# Patient Record
Sex: FEMALE | Race: BLACK OR AFRICAN AMERICAN | Employment: OTHER | URBAN - METROPOLITAN AREA
[De-identification: names, ages, dates, MRNs, and addresses within clinical notes are randomized per-mention and may not be internally consistent; named-entity substitution may affect disease eponyms.]

---

## 2017-06-11 ENCOUNTER — HOSPITAL ENCOUNTER (EMERGENCY)
Age: 67
Discharge: HOME | End: 2017-06-11
Attending: EMERGENCY MEDICINE
Payer: MEDICARE

## 2017-06-11 ENCOUNTER — APPOINTMENT (OUTPATIENT)
Dept: GENERAL RADIOLOGY | Age: 67
End: 2017-06-11
Attending: EMERGENCY MEDICINE
Payer: MEDICARE

## 2017-06-11 VITALS
BODY MASS INDEX: 43.41 KG/M2 | HEIGHT: 60 IN | RESPIRATION RATE: 18 BRPM | OXYGEN SATURATION: 99 % | SYSTOLIC BLOOD PRESSURE: 147 MMHG | TEMPERATURE: 98.5 F | DIASTOLIC BLOOD PRESSURE: 63 MMHG | WEIGHT: 221.12 LBS
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
## 2017-06-11 DIAGNOSIS — S22.32XA CLOSED FRACTURE OF RIB OF LEFT SIDE, INITIAL ENCOUNTER: Primary | ICD-10-CM

## 2017-06-11 PROCEDURE — 71101 X-RAY EXAM UNILAT RIBS/CHEST: CPT

## 2017-06-11 PROCEDURE — 74011250637 HC RX REV CODE- 250/637: Performed by: EMERGENCY MEDICINE

## 2017-06-11 PROCEDURE — 99283 EMERGENCY DEPT VISIT LOW MDM: CPT

## 2017-06-11 RX ORDER — OXYCODONE HYDROCHLORIDE 5 MG/1
5 TABLET ORAL
Qty: 8 TAB | Refills: 0 | Status: SHIPPED | OUTPATIENT
Start: 2017-06-11 | End: 2017-09-22

## 2017-06-11 RX ORDER — ACETAMINOPHEN 500 MG
1000 TABLET ORAL
Status: COMPLETED | OUTPATIENT
Start: 2017-06-11 | End: 2017-06-11

## 2017-06-11 RX ADMIN — ACETAMINOPHEN 1000 MG: 500 TABLET ORAL at 20:16

## 2017-06-11 NOTE — ED NOTES
Assumed care of pt at this time, received bedside report from [address] with pt included in care. Pt is resting in room, with call bell in reach. All questions answered.

## 2017-06-11 NOTE — ED PROVIDER NOTES
HPI Comments: Jordana Mary, 75 y.o. Female with h/o HTN, DM, presents via EMS to [id number] [facility] ED with cc of acute onset of L sided rib pain s/p mechanical GLF yesterday in bathroom. Patient states she typically ambulates with a walker and did not have her walker with her in the bathroom, and fell over onto her L side s/p voiding. She denies any improvement of her pain with 500 mg Tylenol at home. Patient denies any difficulties with her blood sugars. The patient specifically denies head trauma, LOC, difficulty breathing, extremity pain, R rib pain, or other injuries. PCP: Shannan Avelar MD    PMHx significant for: asthma, DM, HTN, CAD, MI, heart failure  PSHx significant for: none  Social history significant for: + Tobacco, - EtOH, - Illicit Drug Use    There are no other complaints, changes, or physical findings at this time. Written by Tesha Brooks ED Scribe, as dictated by Lázaro Andino MD.     The history is provided by the patient. No  was used. Past Medical History:   Diagnosis Date    Asthma     CAD (coronary artery disease)     H/O acute myocardial infarction     Heart failure (HCC)     Hypertension        Past Surgical History:   Procedure Laterality Date    HX CHOLECYSTECTOMY      gall stones removed    HX GYN           No family history on file. Social History     Social History    Marital status: LEGALLY      Spouse name: N/A    Number of children: N/A    Years of education: N/A     Occupational History    Not on file. Social History Main Topics    Smoking status: Current Every Day Smoker    Smokeless tobacco: Not on file    Alcohol use No    Drug use: Not on file    Sexual activity: Not on file     Other Topics Concern    Not on file     Social History Narrative         ALLERGIES: Garlic and Pcn [penicillins]    Review of Systems   Respiratory: Negative for shortness of breath. Musculoskeletal:        Positive for rib pain. Negative for head trauma, extremity pain, or other injuries. Neurological:        Negative for LOC   All other systems reviewed and are negative. Patient Vitals for the past 12 hrs:   Temp Resp BP SpO2   06/11/17 2012 - - - 99 %   06/11/17 2003 - - - 94 %   06/11/17 2002 - - 147/63 -   06/11/17 1742 98.5 °F (36.9 °C) 18 - 97 %        Physical Exam   Vital signs and nursing notes reviewed    CONSTITUTIONAL: Alert, awake, in no apparent distress; well-developed; well-nourished. Sitting along side of bed with walker. HEAD:  Normocephalic, atraumatic  EYES: PERRL; EOM's intact. ENTM: Nose: no rhinorrhea; Throat: no erythema or exudate, mucous membranes moist  Neck:  Supple. trachea is midline. No midline tenderness. RESP: Chest clear, equal breath sounds. - W/R/R  CV: S1 and S2 WNL; No murmurs, gallops or rubs. 2+ radial and DP pulses bilaterally. No flail chest. Tenderness over L lateral ribcage over ribs 8, 9  GI: non-distended, normal bowel sounds, abdomen soft and non-tender. No masses or organomegaly. : No costo-vertebral angle tenderness. BACK:  Non-tender, normal appearance. Pelvis is stable, NT.   UPPER EXT:  Normal inspection. no joint or soft tissue swelling. Atraumatic. LOWER EXT: No edema, no calf tenderness. Atraumatic. NEURO: Alert and oriented x3, 5/5 strength and light touch sensation intact in bilateral upper and lower extremities. SKIN: No rashes; Warm and dry. Contusion along L lateral ribcage. No overlying rash or vesicles. PSYCH: Normal mood, normal affect  Written by Jenny Fernandes ED Scribgenia, as dictated by Alma Rey MD.       MDM  Number of Diagnoses or Management Options  Closed fracture of rib of left side, initial encounter:   Fall, initial encounter:   Diagnosis management comments:   76 y/o F with mechanical fall with nondisplaced L rib fx to explain pt's L sided CW pain. Plan for discharge home with supportive care.         Amount and/or Complexity of Data Reviewed  Tests in the radiology section of CPT®: ordered and reviewed  Review and summarize past medical records: yes    Patient Progress  Patient progress: stable    ED Course       Procedures     Progress Note:  7:37 PM  Patient ambulatory to bathroom. Written by Belle Gordon, ED Scribe, as dictated by Ashley Atkinson MD.     Progress Note:  8:03 PM  Updated and counseled on XR results. Counseled on home care plan including pain control with Tylenol, bed rest, and ice packs to the affected area. Offered patient narcotics for pain control. Discussed possible side effects of narcotic use including constipation. Addressed questions. Patient is tolerating PO fluids without any difficulty. Written by Belle Gordon ED Scribe, as dictated by Ashley Atkinson MD.     Progress Note:  8:06 PM  Discussed the risks of smoking and the benefits of smoking cessation as well as the long term sequelae of smoking with the pt. The patient verbalized their understanding. Written by Belle Gordon ED Scribe, as dictated by Ashley Atkinson MD.       IMAGING RESULTS:  XR RIBS LT W PA CXR MIN 3 V   Final Result   EXAM: XR RIBS LT W PA CXR MIN 3 V     INDICATION: injury     COMPARISON: Chest x-ray 3/12/2016     FINDINGS: A frontal radiograph of the chest and 3 oblique views of the left ribs  demonstrate a subtle nondisplaced fracture of the lateral ninth rib. There is no  pneumothorax or pleural effusion. The lungs are clear. The cardiac and  mediastinal contours are stable. Note of suboptimal assessment due to abundant  overlying soft tissues.     IMPRESSION  IMPRESSION: Subtle fracture demonstrated laterally in left ninth rib. MEDICATIONS GIVEN:  Medications   acetaminophen (TYLENOL) tablet 1,000 mg (1,000 mg Oral Given 6/11/17 2016)       IMPRESSION:  1. Closed fracture of rib of left side, initial encounter    2. Fall, initial encounter        PLAN:  1.    Discharge Medication List as of 6/11/2017  8:13 PM      START taking these medications    Details   oxyCODONE IR (ROXICODONE) 5 mg immediate release tablet Take 1 Tab by mouth every four (4) hours as needed for Pain for up to 8 doses. Max Daily Amount: 30 mg., Print, Disp-8 Tab, R-0         CONTINUE these medications which have NOT CHANGED    Details   albuterol (PROVENTIL VENTOLIN) 2.5 mg /3 mL (0.083 %) nebulizer solution 3 mL by Nebulization route every four (4) hours as needed for Wheezing or Shortness of Breath., Print, Disp-24 Each, R-3      benzonatate (TESSALON) 100 mg capsule Take 1 Cap by mouth three (3) times daily as needed for Cough. , Print, Disp-30 Cap, R-0      bumetanide (BUMEX) 2 mg tablet Take 0.5 Tabs by mouth daily. , Print, Disp-30 Tab, R-0      ipratropium (ATROVENT) 0.02 % nebulizer solution 2.5 mL by Nebulization route every six (6) hours as needed for Wheezing., Print, Disp-25 mL, R-3      losartan (COZAAR) 25 mg tablet Take 1 Tab by mouth daily. Start after 3/21/16, Print, Disp-30 Tab, R-0      calcitRIOL (ROCALTROL) 0.25 mcg capsule Take 1 Cap by mouth daily. , Print, Disp-30 Cap, R-3      carvedilol (COREG) 3.125 mg tablet Take 1 Tab by mouth two (2) times daily (with meals). , Print, Disp-60 Tab, R-0      predniSONE (STERAPRED DS) 10 mg dose pack See administration instruction per 10mg dose pack, taper over 12 days, Print, Disp-42 Tab, R-0      levofloxacin (LEVAQUIN) 750 mg tablet Take 1 Tab by mouth every fourty-eight (48) hours. , Print, Disp-3 Tab, R-0      HYDROcodone-acetaminophen (LORTAB 5-325) 5-325 mg per tablet Take 1 Tab by mouth every six (6) hours as needed for Pain. Max Daily Amount: 4 Tabs., Print, Disp-40 Tab, R-0      guaiFENesin ER (MUCINEX) 600 mg ER tablet Take 1 Tab by mouth two (2) times daily as needed for Congestion. , Print, Disp-20 Tab, R-0      aspirin delayed-release 81 mg tablet Take 81 mg by mouth daily. , Historical Med      atorvastatin (LIPITOR) 80 mg tablet Take 80 mg by mouth daily. , Historical Med      budesonide-formoterol (SYMBICORT) 160-4.5 mcg/actuation HFA inhaler Take 2 Puffs by inhalation two (2) times a day., Historical Med      acetaminophen (TYLENOL) 500 mg tablet Take 500 mg by mouth every four (4) hours. Indications: BACK PAIN, Historical Med      omeprazole (PRILOSEC) 20 mg capsule Take 20 mg by mouth Before breakfast and dinner., Historical Med      gabapentin (NEURONTIN) 300 mg capsule Take 300 mg by mouth three (3) times daily. , Historical Med      montelukast (SINGULAIR) 10 mg tablet Take 10 mg by mouth daily. , Historical Med      cholecalciferol (VITAMIN D3) 1,000 unit tablet Take 2,000 Units by mouth daily. , Historical Med      clopidogrel (PLAVIX) 75 mg tablet Take 75 mg by mouth daily. , Historical Med      fluticasone (FLONASE) 50 mcg/actuation nasal spray 2 Sprays by Both Nostrils route daily. , Historical Med      sertraline (ZOLOFT) 100 mg tablet Take 100 mg by mouth daily. , Historical Med      loratadine (CLARITIN) 10 mg tablet Take 10 mg by mouth daily as needed for Allergies or Rhinitis., Historical Med      albuterol (PROVENTIL HFA, VENTOLIN HFA) 90 mcg/actuation inhaler Take 2 Puffs by inhalation every four (4) hours as needed for Wheezing or Shortness of Breath., Historical Med           2. Follow-up Information     Follow up With Details Comments Contact Info    Shannan Avelar MD  As needed Patient can only remember the practice name and not the physician      [facility]  If symptoms worsen [address]  [address]   [phone]         Return to ED if worse     Discharge Note:  8:12 PM  The pt is ready for discharge. The pt's signs, symptoms, diagnosis, and discharge instructions have been discussed and pt has conveyed their understanding. The pt is to follow up as recommended or return to ER should their symptoms worsen. Plan has been discussed and pt is in agreement.     This note is prepared by Cindy Mock, acting as a Scribe for Jonas Villar MD.    Jonas Villar MD: The scribe's documentation has been prepared under my direction and personally reviewed by me in its entirety. I confirm that the notes above accurately reflects all work, treatment, procedures, and medical decision making performed by me.

## 2017-06-12 NOTE — ED NOTES
Pt discharged with written instructions and prescriptions at this time. Pt verbalizes understanding and all questions were answered. Discharged by Carin Duque, in stable condition, with a friend.

## 2017-06-12 NOTE — DISCHARGE INSTRUCTIONS
Please ice your sore rib area 5-6 times a day, 20 minutes at a time to reduce pain. Use Tylenol, 1000 mg every 6 hrs for pain. Use the additional pain medication as needed but caution with standing as can make you unsteady. Always use your walker to prevent falls. Broken Rib: Care Instructions  Your Care Instructions    A broken rib is a crack or break in one of the bones of the rib cage. Breathing can be very painful because the muscles used for breathing pull on the rib. In most cases, a broken rib will heal on its own. You can take pain medicine while the rib mends. Pain relief allows you to take deep breaths. In the past, doctors recommended taping or wrapping broken ribs. This is no longer done because taping makes it hard for you to take deep breaths. You need to take deep breaths at least once an hour to prevent pneumonia or a partial collapse of a lung. Your rib will heal in about 6 weeks. You heal best when you take good care of yourself. Eat a variety of healthy foods, and don't smoke. Follow-up care is a key part of your treatment and safety. Be sure to make and go to all appointments, and call your doctor if you are having problems. It's also a good idea to know your test results and keep a list of the medicines you take. How can you care for yourself at home? · Be safe with medicines. Read and follow all instructions on the label. ¨ If the doctor gave you a prescription medicine for pain, take it as prescribed. ¨ If you are not taking a prescription pain medicine, ask your doctor if you can take an over-the-counter medicine. · Even if it hurts, cough or take the deepest breath you can at least once every hour. This will get air deeply into your lungs and reduce your chance of getting pneumonia or a partial collapse of a lung. Hold a pillow against your chest to make this less painful. · Put ice or a cold pack on the area for 10 to 20 minutes at a time.  Put a thin cloth between the ice and your skin. When should you call for help? Call 911 anytime you think you may need emergency care. For example, call if:  · You have severe trouble breathing. Call your doctor now or seek immediate medical care if:  · You have some trouble breathing. · You have a fever. · You have a new or worse cough. Watch closely for changes in your health, and be sure to contact your doctor if:  · You have pain even after taking your medicine. · You do not get better as expected. Where can you learn more? Go to http://vishal-moody.info/. Enter M135 in the search box to learn more about \"Broken Rib: Care Instructions. \"  Current as of: November 3, 2016  Content Version: 11.2  © 5124-6349 [employer]. Care instructions adapted under license by [employer] (which disclaims liability or warranty for this information). If you have questions about a medical condition or this instruction, always ask your healthcare professional. [place] [address] any warranty or liability for your use of this information.

## 2017-09-22 ENCOUNTER — APPOINTMENT (OUTPATIENT)
Dept: GENERAL RADIOLOGY | Age: 67
End: 2017-09-22
Attending: EMERGENCY MEDICINE
Payer: MEDICARE

## 2017-09-22 ENCOUNTER — HOSPITAL ENCOUNTER (EMERGENCY)
Age: 67
Discharge: HOME | End: 2017-09-22
Attending: EMERGENCY MEDICINE
Payer: MEDICARE

## 2017-09-22 VITALS
BODY MASS INDEX: 41.62 KG/M2 | HEART RATE: 79 BPM | SYSTOLIC BLOOD PRESSURE: 143 MMHG | TEMPERATURE: 98.5 F | WEIGHT: 212 LBS | RESPIRATION RATE: 18 BRPM | OXYGEN SATURATION: 94 % | HEIGHT: 60 IN | DIASTOLIC BLOOD PRESSURE: 76 MMHG

## 2017-09-22 DIAGNOSIS — J45.31 MILD PERSISTENT ASTHMA WITH ACUTE EXACERBATION: Primary | ICD-10-CM

## 2017-09-22 LAB
ALBUMIN SERPL-MCNC: 3.7 G/DL (ref 3.5–5)
ALBUMIN/GLOB SERPL: 0.9 {RATIO} (ref 1.1–2.2)
ALP SERPL-CCNC: 123 U/L (ref 45–117)
ALT SERPL-CCNC: 33 U/L (ref 12–78)
ANION GAP SERPL CALC-SCNC: 5 MMOL/L (ref 5–15)
AST SERPL-CCNC: 36 U/L (ref 15–37)
BASOPHILS # BLD: 0 K/UL (ref 0–0.1)
BASOPHILS NFR BLD: 0 % (ref 0–1)
BILIRUB SERPL-MCNC: 0.5 MG/DL (ref 0.2–1)
BNP SERPL-MCNC: 178 PG/ML (ref 0–125)
BUN SERPL-MCNC: 33 MG/DL (ref 6–20)
BUN/CREAT SERPL: 28 (ref 12–20)
CALCIUM SERPL-MCNC: 8.8 MG/DL (ref 8.5–10.1)
CHLORIDE SERPL-SCNC: 83 MMOL/L (ref 97–108)
CK MB CFR SERPL CALC: 1.2 % (ref 0–2.5)
CK MB SERPL-MCNC: 1.4 NG/ML (ref 5–25)
CK SERPL-CCNC: 115 U/L (ref 26–192)
CK SERPL-CCNC: 136 U/L (ref 26–192)
CO2 SERPL-SCNC: 39 MMOL/L (ref 21–32)
CREAT SERPL-MCNC: 1.19 MG/DL (ref 0.55–1.02)
EOSINOPHIL # BLD: 0.3 K/UL (ref 0–0.4)
EOSINOPHIL NFR BLD: 5 % (ref 0–7)
ERYTHROCYTE [DISTWIDTH] IN BLOOD BY AUTOMATED COUNT: 14.6 % (ref 11.5–14.5)
GLOBULIN SER CALC-MCNC: 4.3 G/DL (ref 2–4)
GLUCOSE SERPL-MCNC: 151 MG/DL (ref 65–100)
HCT VFR BLD AUTO: 33.5 % (ref 35–47)
HGB BLD-MCNC: 10.7 G/DL (ref 11.5–16)
LYMPHOCYTES # BLD: 1.3 K/UL (ref 0.8–3.5)
LYMPHOCYTES NFR BLD: 20 % (ref 12–49)
MCH RBC QN AUTO: 26.6 PG (ref 26–34)
MCHC RBC AUTO-ENTMCNC: 31.9 G/DL (ref 30–36.5)
MCV RBC AUTO: 83.1 FL (ref 80–99)
MONOCYTES # BLD: 0.4 K/UL (ref 0–1)
MONOCYTES NFR BLD: 7 % (ref 5–13)
NEUTS SEG # BLD: 4.4 K/UL (ref 1.8–8)
NEUTS SEG NFR BLD: 68 % (ref 32–75)
PLATELET # BLD AUTO: 268 K/UL (ref 150–400)
POTASSIUM SERPL-SCNC: 2.8 MMOL/L (ref 3.5–5.1)
PROT SERPL-MCNC: 8 G/DL (ref 6.4–8.2)
RBC # BLD AUTO: 4.03 M/UL (ref 3.8–5.2)
SODIUM SERPL-SCNC: 127 MMOL/L (ref 136–145)
TROPONIN I SERPL-MCNC: <0.04 NG/ML
TROPONIN I SERPL-MCNC: <0.04 NG/ML
WBC # BLD AUTO: 6.4 K/UL (ref 3.6–11)

## 2017-09-22 PROCEDURE — 36415 COLL VENOUS BLD VENIPUNCTURE: CPT | Performed by: EMERGENCY MEDICINE

## 2017-09-22 PROCEDURE — 77030029684 HC NEB SM VOL KT MONA -A

## 2017-09-22 PROCEDURE — 84484 ASSAY OF TROPONIN QUANT: CPT | Performed by: EMERGENCY MEDICINE

## 2017-09-22 PROCEDURE — 94640 AIRWAY INHALATION TREATMENT: CPT

## 2017-09-22 PROCEDURE — 74011000250 HC RX REV CODE- 250: Performed by: EMERGENCY MEDICINE

## 2017-09-22 PROCEDURE — 71010 XR CHEST PORT: CPT

## 2017-09-22 PROCEDURE — 80053 COMPREHEN METABOLIC PANEL: CPT | Performed by: EMERGENCY MEDICINE

## 2017-09-22 PROCEDURE — 82550 ASSAY OF CK (CPK): CPT | Performed by: EMERGENCY MEDICINE

## 2017-09-22 PROCEDURE — 99285 EMERGENCY DEPT VISIT HI MDM: CPT

## 2017-09-22 PROCEDURE — 93005 ELECTROCARDIOGRAM TRACING: CPT

## 2017-09-22 PROCEDURE — 74011250637 HC RX REV CODE- 250/637: Performed by: EMERGENCY MEDICINE

## 2017-09-22 PROCEDURE — 83880 ASSAY OF NATRIURETIC PEPTIDE: CPT | Performed by: EMERGENCY MEDICINE

## 2017-09-22 PROCEDURE — 82553 CREATINE MB FRACTION: CPT | Performed by: EMERGENCY MEDICINE

## 2017-09-22 PROCEDURE — 85025 COMPLETE CBC W/AUTO DIFF WBC: CPT | Performed by: EMERGENCY MEDICINE

## 2017-09-22 RX ORDER — PREDNISONE 20 MG/1
60 TABLET ORAL DAILY
Qty: 15 TAB | Refills: 0 | Status: SHIPPED | OUTPATIENT
Start: 2017-09-22 | End: 2017-09-27

## 2017-09-22 RX ORDER — IPRATROPIUM BROMIDE AND ALBUTEROL SULFATE 2.5; .5 MG/3ML; MG/3ML
3 SOLUTION RESPIRATORY (INHALATION) ONCE
Status: COMPLETED | OUTPATIENT
Start: 2017-09-22 | End: 2017-09-22

## 2017-09-22 RX ORDER — POTASSIUM CHLORIDE 20 MEQ/1
40 TABLET, EXTENDED RELEASE ORAL
Status: COMPLETED | OUTPATIENT
Start: 2017-09-22 | End: 2017-09-22

## 2017-09-22 RX ADMIN — IPRATROPIUM BROMIDE AND ALBUTEROL SULFATE 3 ML: .5; 3 SOLUTION RESPIRATORY (INHALATION) at 17:52

## 2017-09-22 RX ADMIN — POTASSIUM CHLORIDE 40 MEQ: 1500 TABLET, EXTENDED RELEASE ORAL at 17:52

## 2017-09-22 NOTE — ED NOTES
Bedside and Verbal shift change report given to Danelle Be RN (oncoming nurse) by JURGEN Lozano (offgoing nurse). Report included the following information SBAR, Kardex, ED Summary, MAR, Recent Results and Cardiac Rhythm Sinus Rhythm.

## 2017-09-22 NOTE — ED NOTES
After completing breathing treatment. Patient ambulated within her room. Patient saturations were maintained in the 90s. Patient denies dyspnea.

## 2017-09-22 NOTE — ED NOTES
Patient reports to ED via EMS after going to visit her PCP today. Patient states she began to have some SOB which is worse during exertion. Patient states her PCP sent her here because she has had past MI which usually begin with SOB. Patient placed on the monitor x3, side rails x2, call bell is within reach. RN assisted patient to bedside commode. Patient ambulatory with slow and steady gait.

## 2017-09-22 NOTE — ED PROVIDER NOTES
HPI Comments: Berta Grover is a 75 y.o. female with PMHx significant for Asthma, COPD, HTN, CAD, CKD who presents via EMS to [facility] ED with cc of acute onset SOB worse with exertion x 4 days with associated wheezing and cough productive of clear sputum. Pt has been using her inhaler at home with some relief. She did go in to see her PCP today for the same complaints. Pt was referred to the ED by her PCP when she was found to be hypoxic in the office. Pt does report a hx of MI and her symptoms immediately prior to the MI were SOB and diaphoresis. Pt specifically denies any fever, leg swelling, CP, abdominal pain, nausea, vomiting, diarrhea, hematochezia, diaphoresis. PCP: Shannan Avelar MD    Social Hx: - tobacco (former smoker), -EtOH (-), - illicit drugs (-). There are no other complaints, changes or physical findings at this time. The history is provided by the patient. No  was used. Past Medical History:   Diagnosis Date    Asthma     CAD (coronary artery disease)     Chronic kidney disease     Chronic obstructive pulmonary disease ([facility] [address]     H/O acute myocardial infarction     Heart failure (HCC)     Hypertension        Past Surgical History:   Procedure Laterality Date    HX CHOLECYSTECTOMY      gall stones removed    HX GYN           History reviewed. No pertinent family history. Social History     Social History    Marital status: LEGALLY      Spouse name: N/A    Number of children: N/A    Years of education: N/A     Occupational History    Not on file. Social History Main Topics    Smoking status: Former Smoker     Quit date: 9/14/2016    Smokeless tobacco: Never Used    Alcohol use No    Drug use: Not on file    Sexual activity: Not on file     Other Topics Concern    Not on file     Social History Narrative         ALLERGIES: Garlic and Pcn [penicillins]    Review of Systems   Constitutional: Negative for fatigue and fever. HENT: Negative. Eyes: Negative. Respiratory: Positive for shortness of breath. Negative for wheezing. Cardiovascular: Negative for chest pain and leg swelling. Gastrointestinal: Negative for blood in stool, constipation, diarrhea, nausea and vomiting. Endocrine: Negative. Genitourinary: Negative for difficulty urinating and dysuria. Musculoskeletal: Negative. Skin: Negative for rash. Allergic/Immunologic: Negative. Neurological: Negative for weakness and numbness. Hematological: Negative. Psychiatric/Behavioral: Negative. Patient Vitals for the past 12 hrs:   Temp Pulse Resp BP SpO2   09/22/17 2041 - 79 18 143/76 94 %   09/22/17 1800 - 71 16 147/71 98 %   09/22/17 1600 - 69 15 133/65 90 %   09/22/17 1533 98.5 °F (36.9 °C) 70 15 131/65 92 %            Physical Exam   Constitutional: She is oriented to person, place, and time. She appears well-developed and well-nourished. No distress. HENT:   Head: Normocephalic and atraumatic. Mouth/Throat: Oropharynx is clear and moist.   Eyes: Conjunctivae and EOM are normal.   Neck: Neck supple. No JVD present. No tracheal deviation present. Cardiovascular: Normal rate, regular rhythm and intact distal pulses. Exam reveals no gallop and no friction rub. No murmur heard. Pulmonary/Chest: Effort normal. No stridor. No respiratory distress. She has no wheezes. She has rhonchi (bilateral lung fields). No increased work of breathing. No respiratory distress. Abdominal: Soft. Bowel sounds are normal. She exhibits no distension and no mass. There is no tenderness. There is no guarding. Musculoskeletal: Normal range of motion. She exhibits no edema or tenderness. No deformity   Neurological: She is alert and oriented to person, place, and time. She has normal strength. No focal deficits   Skin: Skin is warm, dry and intact. No rash noted. Psychiatric: She has a normal mood and affect.  Her behavior is normal. Judgment and thought content normal.   Nursing note and vitals reviewed. MDM  Number of Diagnoses or Management Options  Mild persistent asthma with acute exacerbation:   Diagnosis management comments: DDx: acs, asthma exacerbation, pneumonia, pulmonary edema, viral syndrome, uri       Amount and/or Complexity of Data Reviewed  Clinical lab tests: ordered and reviewed  Tests in the radiology section of CPT®: ordered and reviewed  Tests in the medicine section of CPT®: ordered and reviewed  Review and summarize past medical records: yes  Independent visualization of images, tracings, or specimens: yes    Patient Progress  Patient progress: stable    ED Course       Procedures    EKG interpretation: (Preliminary)  3:39 PM  Rhythm: normal sinus rhythm; and regular . Rate (approx.): 68; Axis: normal; NV interval: normal; QRS interval: normal ; ST/T wave: normal.  Written by NEENA Brannon as dictated by Tata Hillman DO    PROGRESS NOTE:  3:40 PM  External Chart review: Pt was sent here from the PCP's office when her O2 at room air was 88%. Pt de-sated to 74% with ambulation in the office. Pt received a duo-neb without changes in her O2 sat's. Her repeat EKG in the office remained unchanged. Was sent here to the ED to rule out unstable angina and MI. Was prescribed a z-pack and a refill on her COPD medications. Written by NEENA Brannon, as dictated by Tata Hillman DO. PROGRESS NOTE:  6:41 PM  Pt was ambulated and her O2 sats remained in the 90's.   Written by NEENA Brannon, as dictated by Tata Hillman DO.       LABORATORY TESTS:  Recent Results (from the past 12 hour(s))   EKG, 12 LEAD, INITIAL    Collection Time: 09/22/17  3:39 PM   Result Value Ref Range    Ventricular Rate 68 BPM    Atrial Rate 68 BPM    P-R Interval 122 ms    QRS Duration 112 ms    Q-T Interval 446 ms    QTC Calculation (Bezet) 474 ms    Calculated P Axis 51 degrees    Calculated R Axis 21 degrees    Calculated T Axis 48 degrees    Diagnosis       Normal sinus rhythm  Normal ECG  When compared with ECG of 11-MAR-2016 04:48,  Vent. rate has decreased BY  39 BPM  T wave inversion no longer evident in Anterolateral leads  QT has shortened     CBC WITH AUTOMATED DIFF    Collection Time: 09/22/17  4:30 PM   Result Value Ref Range    WBC 6.4 3.6 - 11.0 K/uL    RBC 4.03 3.80 - 5.20 M/uL    HGB 10.7 (L) 11.5 - 16.0 g/dL    HCT 33.5 (L) 35.0 - 47.0 %    MCV 83.1 80.0 - 99.0 FL    MCH 26.6 26.0 - 34.0 PG    MCHC 31.9 30.0 - 36.5 g/dL    RDW 14.6 (H) 11.5 - 14.5 %    PLATELET 374 267 - 638 K/uL    NEUTROPHILS 68 32 - 75 %    LYMPHOCYTES 20 12 - 49 %    MONOCYTES 7 5 - 13 %    EOSINOPHILS 5 0 - 7 %    BASOPHILS 0 0 - 1 %    ABS. NEUTROPHILS 4.4 1.8 - 8.0 K/UL    ABS. LYMPHOCYTES 1.3 0.8 - 3.5 K/UL    ABS. MONOCYTES 0.4 0.0 - 1.0 K/UL    ABS. EOSINOPHILS 0.3 0.0 - 0.4 K/UL    ABS. BASOPHILS 0.0 0.0 - 0.1 K/UL   METABOLIC PANEL, COMPREHENSIVE    Collection Time: 09/22/17  4:30 PM   Result Value Ref Range    Sodium 127 (L) 136 - 145 mmol/L    Potassium 2.8 (L) 3.5 - 5.1 mmol/L    Chloride 83 (L) 97 - 108 mmol/L    CO2 39 (H) 21 - 32 mmol/L    Anion gap 5 5 - 15 mmol/L    Glucose 151 (H) 65 - 100 mg/dL    BUN 33 (H) 6 - 20 MG/DL    Creatinine 1.19 (H) 0.55 - 1.02 MG/DL    BUN/Creatinine ratio 28 (H) 12 - 20      GFR est AA 55 (L) >60 ml/min/1.73m2    GFR est non-AA 45 (L) >60 ml/min/1.73m2    Calcium 8.8 8.5 - 10.1 MG/DL    Bilirubin, total 0.5 0.2 - 1.0 MG/DL    ALT (SGPT) 33 12 - 78 U/L    AST (SGOT) 36 15 - 37 U/L    Alk.  phosphatase 123 (H) 45 - 117 U/L    Protein, total 8.0 6.4 - 8.2 g/dL    Albumin 3.7 3.5 - 5.0 g/dL    Globulin 4.3 (H) 2.0 - 4.0 g/dL    A-G Ratio 0.9 (L) 1.1 - 2.2     CK W/ REFLX CKMB    Collection Time: 09/22/17  4:30 PM   Result Value Ref Range     26 - 192 U/L   TROPONIN I    Collection Time: 09/22/17  4:30 PM   Result Value Ref Range    Troponin-I, Qt. <0.04 <0.05 ng/mL   NT-PRO BNP Collection Time: 09/22/17  4:30 PM   Result Value Ref Range    NT pro- (H) 0 - 125 PG/ML   CK W/ CKMB & INDEX    Collection Time: 09/22/17  7:28 PM   Result Value Ref Range     26 - 192 U/L    CK - MB 1.4 <3.6 NG/ML    CK-MB Index 1.2 0 - 2.5     TROPONIN I    Collection Time: 09/22/17  7:28 PM   Result Value Ref Range    Troponin-I, Qt. <0.04 <0.05 ng/mL       IMAGING RESULTS:  XR CHEST PORT   Final Result   CXR Results  (Last 48 hours)               09/22/17 1733  XR CHEST PORT Final result    Impression:  IMPRESSION: No infiltrate. Narrative:  EXAM:  XR CHEST PORT       INDICATION:  Shortness of breath and dyspnea on exertion. Onset 4 days ago. COMPARISON:  6/11/2017       FINDINGS: A portable AP radiograph of the chest was obtained at 1721 hours. The   patient is on a cardiac monitor. There is a slight accentuation of interstitial   markings. The cardiac and mediastinal contours are stable. The bones and soft   tissues are grossly within normal limits. MEDICATIONS GIVEN:  Medications   albuterol-ipratropium (DUO-NEB) 2.5 MG-0.5 MG/3 ML (3 mL Nebulization Given 9/22/17 1752)   potassium chloride (K-DUR, KLOR-CON) SR tablet 40 mEq (40 mEq Oral Given 9/22/17 1752)       IMPRESSION:  1. Mild persistent asthma with acute exacerbation        PLAN:  1. Discharge Medication List as of 9/22/2017  8:30 PM      START taking these medications    Details   predniSONE (DELTASONE) 20 mg tablet Take 3 Tabs by mouth daily for 5 days. , Normal, Disp-15 Tab, R-0         CONTINUE these medications which have NOT CHANGED    Details   albuterol (PROVENTIL VENTOLIN) 2.5 mg /3 mL (0.083 %) nebulizer solution 3 mL by Nebulization route every four (4) hours as needed for Wheezing or Shortness of Breath., Print, Disp-24 Each, R-3      benzonatate (TESSALON) 100 mg capsule Take 1 Cap by mouth three (3) times daily as needed for Cough. , Print, Disp-30 Cap, R-0      bumetanide (BUMEX) 2 mg tablet Take 0.5 Tabs by mouth daily. , Print, Disp-30 Tab, R-0      ipratropium (ATROVENT) 0.02 % nebulizer solution 2.5 mL by Nebulization route every six (6) hours as needed for Wheezing., Print, Disp-25 mL, R-3      calcitRIOL (ROCALTROL) 0.25 mcg capsule Take 1 Cap by mouth daily. , Print, Disp-30 Cap, R-3      carvedilol (COREG) 3.125 mg tablet Take 1 Tab by mouth two (2) times daily (with meals). , Print, Disp-60 Tab, R-0      HYDROcodone-acetaminophen (LORTAB 5-325) 5-325 mg per tablet Take 1 Tab by mouth every six (6) hours as needed for Pain. Max Daily Amount: 4 Tabs., Print, Disp-40 Tab, R-0      guaiFENesin ER (MUCINEX) 600 mg ER tablet Take 1 Tab by mouth two (2) times daily as needed for Congestion. , Print, Disp-20 Tab, R-0      aspirin delayed-release 81 mg tablet Take 81 mg by mouth daily. , Historical Med      atorvastatin (LIPITOR) 80 mg tablet Take 80 mg by mouth daily. , Historical Med      budesonide-formoterol (SYMBICORT) 160-4.5 mcg/actuation HFA inhaler Take 2 Puffs by inhalation two (2) times a day., Historical Med      omeprazole (PRILOSEC) 20 mg capsule Take 20 mg by mouth Before breakfast and dinner., Historical Med      gabapentin (NEURONTIN) 300 mg capsule Take 300 mg by mouth three (3) times daily. , Historical Med      montelukast (SINGULAIR) 10 mg tablet Take 10 mg by mouth daily. , Historical Med      cholecalciferol (VITAMIN D3) 1,000 unit tablet Take 2,000 Units by mouth daily. , Historical Med      clopidogrel (PLAVIX) 75 mg tablet Take 75 mg by mouth daily. , Historical Med      fluticasone (FLONASE) 50 mcg/actuation nasal spray 2 Sprays by Both Nostrils route daily. , Historical Med      sertraline (ZOLOFT) 100 mg tablet Take 100 mg by mouth daily. , Historical Med      loratadine (CLARITIN) 10 mg tablet Take 10 mg by mouth daily as needed for Allergies or Rhinitis., Historical Med      albuterol (PROVENTIL HFA, VENTOLIN HFA) 90 mcg/actuation inhaler Take 2 Puffs by inhalation every four (4) hours as needed for Wheezing or Shortness of Breath., Historical Med      losartan (COZAAR) 25 mg tablet Take 1 Tab by mouth daily. Start after 3/21/16, Print, Disp-30 Tab, R-0      acetaminophen (TYLENOL) 500 mg tablet Take 500 mg by mouth every four (4) hours. Indications: BACK PAIN, Historical Med           2. Follow-up Information     Follow up With Details Comments Contact Info    Your PCP Schedule an appointment as soon as possible for a visit      [facility]  As needed, If symptoms worsen [address] [place] [address]  [phone]        Return to ED if worse     DISCHARGE NOTE  8:30 PM  The patient has been re-evaluated and is ready for discharge. Reviewed available results with patient. Counseled pt on diagnosis and care plan. Pt has expressed understanding, and all questions have been answered. Pt agrees with plan and agrees to F/U as recommended, or return to the ED if their sxs worsen. Discharge instructions have been provided and explained to the pt, along with reasons to return to the ED. This note is prepared by Alfa Slade acting as Scribe for DO Ra Ritter DO : The scribe's documentation has been prepared under my direction and personally reviewed by me in its entirety. I confirm that the note above accurately reflects all work, treatment, procedures, and medical decision making performed by me.

## 2017-09-23 LAB
ATRIAL RATE: 68 BPM
CALCULATED P AXIS, ECG09: 51 DEGREES
CALCULATED R AXIS, ECG10: 21 DEGREES
CALCULATED T AXIS, ECG11: 48 DEGREES
DIAGNOSIS, 93000: NORMAL
P-R INTERVAL, ECG05: 122 MS
Q-T INTERVAL, ECG07: 446 MS
QRS DURATION, ECG06: 112 MS
QTC CALCULATION (BEZET), ECG08: 474 MS
VENTRICULAR RATE, ECG03: 68 BPM

## 2017-09-23 NOTE — DISCHARGE INSTRUCTIONS

## 2017-09-23 NOTE — ED NOTES
Dr. Krys Virk  reviewed discharge instructions with the patient. The patient verbalized understanding.

## 2017-10-30 ENCOUNTER — HOSPITAL ENCOUNTER (OUTPATIENT)
Dept: BONE DENSITY | Age: 67
Discharge: HOME | End: 2017-10-30
Attending: STUDENT IN AN ORGANIZED HEALTH CARE EDUCATION/TRAINING PROGRAM
Payer: MEDICARE

## 2017-10-30 DIAGNOSIS — M81.0 OSTEOPOROSIS: ICD-10-CM

## 2017-10-30 PROCEDURE — 77080 DXA BONE DENSITY AXIAL: CPT

## 2017-12-12 ENCOUNTER — HOSPITAL ENCOUNTER (OUTPATIENT)
Dept: NUCLEAR MEDICINE | Age: 67
Discharge: HOME | End: 2017-12-12
Attending: INTERNAL MEDICINE
Payer: MEDICARE

## 2017-12-12 ENCOUNTER — HOSPITAL ENCOUNTER (OUTPATIENT)
Dept: NON INVASIVE DIAGNOSTICS | Age: 67
Discharge: HOME | End: 2017-12-12
Attending: INTERNAL MEDICINE
Payer: MEDICARE

## 2017-12-12 DIAGNOSIS — I50.42 CHRONIC COMBINED SYSTOLIC AND DIASTOLIC HEART FAILURE (HCC): ICD-10-CM

## 2017-12-12 DIAGNOSIS — I25.119 ATHEROSCLEROTIC HEART DISEASE OF NATIVE CORONARY ARTERY WITH ANGINA PECTORIS (HCC): ICD-10-CM

## 2017-12-12 LAB
ATTENDING PHYSICIAN, CST07: NORMAL
DIAGNOSIS, 93000: NORMAL
DUKE TM SCORE RESULT, CST14: NORMAL
DUKE TREADMILL SCORE, CST13: 5456
ECG INTERP BEFORE EX, CST11: NORMAL
ECG INTERP DURING EX, CST12: NORMAL
FUNCTIONAL CAPACITY, CST17: NORMAL
KNOWN CARDIAC CONDITION, CST08: NORMAL
MAX. DIASTOLIC BP, CST04: 72 MMHG
MAX. HEART RATE, CST05: 88 BPM
MAX. SYSTOLIC BP, CST03: 167 MMHG
OVERALL BP RESPONSE TO EXERCISE, CST16: NORMAL
OVERALL HR RESPONSE TO EXERCISE, CST15: NORMAL
PEAK EX METS, CST10: 1 METS
PROTOCOL NAME, CST01: NORMAL
TEST INDICATION, CST09: NORMAL

## 2017-12-12 PROCEDURE — 93017 CV STRESS TEST TRACING ONLY: CPT

## 2017-12-12 PROCEDURE — 78452 HT MUSCLE IMAGE SPECT MULT: CPT

## 2017-12-12 PROCEDURE — 74011250636 HC RX REV CODE- 250/636

## 2017-12-12 RX ORDER — SODIUM CHLORIDE 0.9 % (FLUSH) 0.9 %
10 SYRINGE (ML) INJECTION AS NEEDED
Status: DISCONTINUED | OUTPATIENT
Start: 2017-12-12 | End: 2017-12-16 | Stop reason: HOSPADM

## 2017-12-12 RX ORDER — SODIUM CHLORIDE 0.9 % (FLUSH) 0.9 %
SYRINGE (ML) INJECTION
Status: COMPLETED
Start: 2017-12-12 | End: 2017-12-12

## 2017-12-12 RX ADMIN — Medication 10 ML: at 11:31

## 2017-12-12 RX ADMIN — REGADENOSON 0.4 MG: 0.08 INJECTION, SOLUTION INTRAVENOUS at 11:31

## 2018-01-04 ENCOUNTER — HOSPITAL ENCOUNTER (OUTPATIENT)
Age: 68
Setting detail: OBSERVATION
Discharge: HOME | End: 2018-01-06
Attending: EMERGENCY MEDICINE | Admitting: INTERNAL MEDICINE
Payer: MEDICARE

## 2018-01-04 ENCOUNTER — APPOINTMENT (OUTPATIENT)
Dept: CT IMAGING | Age: 68
End: 2018-01-04
Attending: EMERGENCY MEDICINE
Payer: MEDICARE

## 2018-01-04 ENCOUNTER — APPOINTMENT (OUTPATIENT)
Dept: GENERAL RADIOLOGY | Age: 68
End: 2018-01-04
Attending: EMERGENCY MEDICINE
Payer: MEDICARE

## 2018-01-04 DIAGNOSIS — E87.6 HYPOKALEMIA: ICD-10-CM

## 2018-01-04 DIAGNOSIS — E87.1 HYPONATREMIA: Primary | ICD-10-CM

## 2018-01-04 LAB
ALBUMIN SERPL-MCNC: 4 G/DL (ref 3.5–5)
ALBUMIN/GLOB SERPL: 0.9 {RATIO} (ref 1.1–2.2)
ALP SERPL-CCNC: 131 U/L (ref 45–117)
ALT SERPL-CCNC: 35 U/L (ref 12–78)
ANION GAP BLD CALC-SCNC: 15 MMOL/L (ref 5–15)
ANION GAP SERPL CALC-SCNC: 9 MMOL/L (ref 5–15)
AST SERPL-CCNC: 25 U/L (ref 15–37)
BASOPHILS # BLD: 0 K/UL (ref 0–0.1)
BASOPHILS NFR BLD: 0 % (ref 0–1)
BILIRUB SERPL-MCNC: 0.7 MG/DL (ref 0.2–1)
BNP SERPL-MCNC: 299 PG/ML (ref 0–125)
BUN BLD-MCNC: 41 MG/DL (ref 9–20)
BUN SERPL-MCNC: 43 MG/DL (ref 6–20)
BUN/CREAT SERPL: 31 (ref 12–20)
CA-I BLD-MCNC: 1.03 MMOL/L (ref 1.12–1.32)
CALCIUM SERPL-MCNC: 9.7 MG/DL (ref 8.5–10.1)
CHLORIDE BLD-SCNC: 70 MMOL/L (ref 98–107)
CHLORIDE SERPL-SCNC: 70 MMOL/L (ref 97–108)
CO2 BLD-SCNC: 45 MMOL/L (ref 21–32)
CO2 SERPL-SCNC: 43 MMOL/L (ref 21–32)
CREAT BLD-MCNC: 1.4 MG/DL (ref 0.6–1.3)
CREAT SERPL-MCNC: 1.4 MG/DL (ref 0.55–1.02)
EOSINOPHIL # BLD: 0.1 K/UL (ref 0–0.4)
EOSINOPHIL NFR BLD: 1 % (ref 0–7)
ERYTHROCYTE [DISTWIDTH] IN BLOOD BY AUTOMATED COUNT: 14.2 % (ref 11.5–14.5)
GLOBULIN SER CALC-MCNC: 4.5 G/DL (ref 2–4)
GLUCOSE BLD-MCNC: 255 MG/DL (ref 65–100)
GLUCOSE SERPL-MCNC: 251 MG/DL (ref 65–100)
HCT VFR BLD AUTO: 37.7 % (ref 35–47)
HCT VFR BLD CALC: 40 % (ref 35–47)
HGB BLD-MCNC: 12.4 G/DL (ref 11.5–16)
HGB BLD-MCNC: 13.6 GM/DL (ref 11.5–16)
LYMPHOCYTES # BLD: 1.3 K/UL (ref 0.8–3.5)
LYMPHOCYTES NFR BLD: 15 % (ref 12–49)
MAGNESIUM SERPL-MCNC: 1.9 MG/DL (ref 1.6–2.4)
MCH RBC QN AUTO: 26.6 PG (ref 26–34)
MCHC RBC AUTO-ENTMCNC: 32.9 G/DL (ref 30–36.5)
MCV RBC AUTO: 80.7 FL (ref 80–99)
MONOCYTES # BLD: 0.7 K/UL (ref 0–1)
MONOCYTES NFR BLD: 8 % (ref 5–13)
NEUTS SEG # BLD: 6.5 K/UL (ref 1.8–8)
NEUTS SEG NFR BLD: 76 % (ref 32–75)
PLATELET # BLD AUTO: 281 K/UL (ref 150–400)
POTASSIUM BLD-SCNC: 2.6 MMOL/L (ref 3.5–5.1)
POTASSIUM SERPL-SCNC: 2.4 MMOL/L (ref 3.5–5.1)
PROT SERPL-MCNC: 8.5 G/DL (ref 6.4–8.2)
RBC # BLD AUTO: 4.67 M/UL (ref 3.8–5.2)
SERVICE CMNT-IMP: ABNORMAL
SODIUM BLD-SCNC: 125 MMOL/L (ref 136–145)
SODIUM SERPL-SCNC: 122 MMOL/L (ref 136–145)
TROPONIN I SERPL-MCNC: <0.04 NG/ML
WBC # BLD AUTO: 8.5 K/UL (ref 3.6–11)

## 2018-01-04 PROCEDURE — 80047 BASIC METABLC PNL IONIZED CA: CPT

## 2018-01-04 PROCEDURE — 77010033678 HC OXYGEN DAILY

## 2018-01-04 PROCEDURE — 99218 HC RM OBSERVATION: CPT

## 2018-01-04 PROCEDURE — 74011250636 HC RX REV CODE- 250/636: Performed by: INTERNAL MEDICINE

## 2018-01-04 PROCEDURE — 74011250636 HC RX REV CODE- 250/636: Performed by: EMERGENCY MEDICINE

## 2018-01-04 PROCEDURE — 83735 ASSAY OF MAGNESIUM: CPT | Performed by: INTERNAL MEDICINE

## 2018-01-04 PROCEDURE — 96368 THER/DIAG CONCURRENT INF: CPT

## 2018-01-04 PROCEDURE — 85025 COMPLETE CBC W/AUTO DIFF WBC: CPT | Performed by: EMERGENCY MEDICINE

## 2018-01-04 PROCEDURE — 74011250637 HC RX REV CODE- 250/637: Performed by: INTERNAL MEDICINE

## 2018-01-04 PROCEDURE — 96372 THER/PROPH/DIAG INJ SC/IM: CPT

## 2018-01-04 PROCEDURE — 36415 COLL VENOUS BLD VENIPUNCTURE: CPT | Performed by: EMERGENCY MEDICINE

## 2018-01-04 PROCEDURE — 71045 X-RAY EXAM CHEST 1 VIEW: CPT

## 2018-01-04 PROCEDURE — 70450 CT HEAD/BRAIN W/O DYE: CPT

## 2018-01-04 PROCEDURE — 83880 ASSAY OF NATRIURETIC PEPTIDE: CPT | Performed by: EMERGENCY MEDICINE

## 2018-01-04 PROCEDURE — 93005 ELECTROCARDIOGRAM TRACING: CPT

## 2018-01-04 PROCEDURE — 84484 ASSAY OF TROPONIN QUANT: CPT | Performed by: EMERGENCY MEDICINE

## 2018-01-04 PROCEDURE — 80053 COMPREHEN METABOLIC PANEL: CPT | Performed by: EMERGENCY MEDICINE

## 2018-01-04 PROCEDURE — 96361 HYDRATE IV INFUSION ADD-ON: CPT

## 2018-01-04 PROCEDURE — 99285 EMERGENCY DEPT VISIT HI MDM: CPT

## 2018-01-04 PROCEDURE — 96365 THER/PROPH/DIAG IV INF INIT: CPT

## 2018-01-04 PROCEDURE — 81001 URINALYSIS AUTO W/SCOPE: CPT | Performed by: EMERGENCY MEDICINE

## 2018-01-04 PROCEDURE — 72131 CT LUMBAR SPINE W/O DYE: CPT

## 2018-01-04 PROCEDURE — 74011250637 HC RX REV CODE- 250/637: Performed by: EMERGENCY MEDICINE

## 2018-01-04 RX ORDER — ATORVASTATIN CALCIUM 40 MG/1
80 TABLET, FILM COATED ORAL DAILY
Status: DISCONTINUED | OUTPATIENT
Start: 2018-01-05 | End: 2018-01-06 | Stop reason: HOSPADM

## 2018-01-04 RX ORDER — BUMETANIDE 1 MG/1
1 TABLET ORAL 2 TIMES DAILY
Status: DISCONTINUED | OUTPATIENT
Start: 2018-01-05 | End: 2018-01-06 | Stop reason: HOSPADM

## 2018-01-04 RX ORDER — SODIUM CHLORIDE 9 MG/ML
100 INJECTION, SOLUTION INTRAVENOUS CONTINUOUS
Status: DISCONTINUED | OUTPATIENT
Start: 2018-01-04 | End: 2018-01-06 | Stop reason: HOSPADM

## 2018-01-04 RX ORDER — DEXTROSE 50 % IN WATER (D50W) INTRAVENOUS SYRINGE
12.5-25 AS NEEDED
Status: DISCONTINUED | OUTPATIENT
Start: 2018-01-04 | End: 2018-01-06 | Stop reason: HOSPADM

## 2018-01-04 RX ORDER — POTASSIUM CHLORIDE 750 MG/1
40 TABLET, FILM COATED, EXTENDED RELEASE ORAL
Status: ACTIVE | OUTPATIENT
Start: 2018-01-04 | End: 2018-01-05

## 2018-01-04 RX ORDER — FLUTICASONE PROPIONATE 50 MCG
2 SPRAY, SUSPENSION (ML) NASAL DAILY
Status: DISCONTINUED | OUTPATIENT
Start: 2018-01-05 | End: 2018-01-06 | Stop reason: HOSPADM

## 2018-01-04 RX ORDER — MONTELUKAST SODIUM 10 MG/1
10 TABLET ORAL DAILY
Status: DISCONTINUED | OUTPATIENT
Start: 2018-01-05 | End: 2018-01-06 | Stop reason: HOSPADM

## 2018-01-04 RX ORDER — CARVEDILOL 6.25 MG/1
6.25 TABLET ORAL 2 TIMES DAILY WITH MEALS
Status: DISCONTINUED | OUTPATIENT
Start: 2018-01-05 | End: 2018-01-06 | Stop reason: HOSPADM

## 2018-01-04 RX ORDER — POTASSIUM CHLORIDE 14.9 MG/ML
10 INJECTION INTRAVENOUS ONCE
Status: COMPLETED | OUTPATIENT
Start: 2018-01-04 | End: 2018-01-05

## 2018-01-04 RX ORDER — GABAPENTIN 300 MG/1
300 CAPSULE ORAL 3 TIMES DAILY
Status: DISCONTINUED | OUTPATIENT
Start: 2018-01-05 | End: 2018-01-06 | Stop reason: HOSPADM

## 2018-01-04 RX ORDER — ASPIRIN 81 MG/1
81 TABLET ORAL DAILY
Status: DISCONTINUED | OUTPATIENT
Start: 2018-01-05 | End: 2018-01-06 | Stop reason: HOSPADM

## 2018-01-04 RX ORDER — CLOPIDOGREL BISULFATE 75 MG/1
75 TABLET ORAL DAILY
Status: DISCONTINUED | OUTPATIENT
Start: 2018-01-05 | End: 2018-01-06 | Stop reason: HOSPADM

## 2018-01-04 RX ORDER — ONDANSETRON 2 MG/ML
4 INJECTION INTRAMUSCULAR; INTRAVENOUS
Status: DISCONTINUED | OUTPATIENT
Start: 2018-01-04 | End: 2018-01-06 | Stop reason: HOSPADM

## 2018-01-04 RX ORDER — SODIUM CHLORIDE TAB 1 GM 1 G
1 TAB MISCELLANEOUS
Status: DISCONTINUED | OUTPATIENT
Start: 2018-01-04 | End: 2018-01-06 | Stop reason: HOSPADM

## 2018-01-04 RX ORDER — ZOLPIDEM TARTRATE 5 MG/1
5 TABLET ORAL
Status: DISCONTINUED | OUTPATIENT
Start: 2018-01-04 | End: 2018-01-06 | Stop reason: HOSPADM

## 2018-01-04 RX ORDER — ENOXAPARIN SODIUM 100 MG/ML
30 INJECTION SUBCUTANEOUS EVERY 24 HOURS
Status: DISCONTINUED | OUTPATIENT
Start: 2018-01-04 | End: 2018-01-05

## 2018-01-04 RX ORDER — IPRATROPIUM BROMIDE AND ALBUTEROL SULFATE 2.5; .5 MG/3ML; MG/3ML
3 SOLUTION RESPIRATORY (INHALATION)
Status: DISCONTINUED | OUTPATIENT
Start: 2018-01-04 | End: 2018-01-06 | Stop reason: HOSPADM

## 2018-01-04 RX ORDER — MAGNESIUM SULFATE 100 %
4 CRYSTALS MISCELLANEOUS AS NEEDED
Status: DISCONTINUED | OUTPATIENT
Start: 2018-01-04 | End: 2018-01-06 | Stop reason: HOSPADM

## 2018-01-04 RX ORDER — SODIUM CHLORIDE 9 MG/ML
125 INJECTION, SOLUTION INTRAVENOUS CONTINUOUS
Status: DISCONTINUED | OUTPATIENT
Start: 2018-01-04 | End: 2018-01-05

## 2018-01-04 RX ORDER — POTASSIUM CHLORIDE 750 MG/1
20 TABLET, FILM COATED, EXTENDED RELEASE ORAL
Status: COMPLETED | OUTPATIENT
Start: 2018-01-04 | End: 2018-01-04

## 2018-01-04 RX ORDER — LANOLIN ALCOHOL/MO/W.PET/CERES
1 CREAM (GRAM) TOPICAL 2 TIMES DAILY WITH MEALS
Status: DISCONTINUED | OUTPATIENT
Start: 2018-01-05 | End: 2018-01-06 | Stop reason: HOSPADM

## 2018-01-04 RX ORDER — BUDESONIDE 0.5 MG/2ML
500 INHALANT ORAL
Status: DISCONTINUED | OUTPATIENT
Start: 2018-01-04 | End: 2018-01-06 | Stop reason: HOSPADM

## 2018-01-04 RX ORDER — LANOLIN ALCOHOL/MO/W.PET/CERES
400 CREAM (GRAM) TOPICAL DAILY
Status: DISCONTINUED | OUTPATIENT
Start: 2018-01-05 | End: 2018-01-05

## 2018-01-04 RX ORDER — ISOSORBIDE MONONITRATE 30 MG/1
30 TABLET, EXTENDED RELEASE ORAL 2 TIMES DAILY
Status: DISCONTINUED | OUTPATIENT
Start: 2018-01-05 | End: 2018-01-06 | Stop reason: HOSPADM

## 2018-01-04 RX ORDER — DOCUSATE SODIUM 100 MG/1
100 CAPSULE, LIQUID FILLED ORAL 2 TIMES DAILY
Status: DISCONTINUED | OUTPATIENT
Start: 2018-01-05 | End: 2018-01-06 | Stop reason: HOSPADM

## 2018-01-04 RX ORDER — ACETAMINOPHEN 500 MG
500 TABLET ORAL
Status: DISCONTINUED | OUTPATIENT
Start: 2018-01-04 | End: 2018-01-06 | Stop reason: HOSPADM

## 2018-01-04 RX ORDER — SERTRALINE HYDROCHLORIDE 50 MG/1
100 TABLET, FILM COATED ORAL DAILY
Status: DISCONTINUED | OUTPATIENT
Start: 2018-01-05 | End: 2018-01-06 | Stop reason: HOSPADM

## 2018-01-04 RX ORDER — INSULIN LISPRO 100 [IU]/ML
INJECTION, SOLUTION INTRAVENOUS; SUBCUTANEOUS
Status: DISCONTINUED | OUTPATIENT
Start: 2018-01-05 | End: 2018-01-06 | Stop reason: HOSPADM

## 2018-01-04 RX ORDER — ARFORMOTEROL TARTRATE 15 UG/2ML
15 SOLUTION RESPIRATORY (INHALATION)
Status: DISCONTINUED | OUTPATIENT
Start: 2018-01-04 | End: 2018-01-06 | Stop reason: HOSPADM

## 2018-01-04 RX ORDER — PANTOPRAZOLE SODIUM 40 MG/1
40 TABLET, DELAYED RELEASE ORAL
Status: DISCONTINUED | OUTPATIENT
Start: 2018-01-05 | End: 2018-01-06 | Stop reason: HOSPADM

## 2018-01-04 RX ADMIN — POTASSIUM CHLORIDE 20 MEQ: 750 TABLET, FILM COATED, EXTENDED RELEASE ORAL at 23:50

## 2018-01-04 RX ADMIN — SODIUM CHLORIDE 500 ML: 900 INJECTION, SOLUTION INTRAVENOUS at 23:27

## 2018-01-04 RX ADMIN — POTASSIUM CHLORIDE 10 MEQ: 200 INJECTION, SOLUTION INTRAVENOUS at 23:27

## 2018-01-04 RX ADMIN — SODIUM CHLORIDE TAB 1 GM 1 G: 1 TAB at 23:58

## 2018-01-04 RX ADMIN — SODIUM CHLORIDE 125 ML/HR: 900 INJECTION, SOLUTION INTRAVENOUS at 23:16

## 2018-01-04 RX ADMIN — ENOXAPARIN SODIUM 30 MG: 30 INJECTION SUBCUTANEOUS at 23:50

## 2018-01-04 NOTE — IP AVS SNAPSHOT
[address] [facility] [address] [place] [address] 
[phone] Patient: Bel Arora MRN: [id number] KGW:[birth year] About your hospitalization You were admitted on:  January 4, 2018 You last received care in the:  [facility] 4 [facility] You were discharged on:  January 6, 2018 Why you were hospitalized Your primary diagnosis was:  Not on File Your diagnoses also included:  Hypokalemia Follow-up Information Follow up With Details Comments Contact Info [address] [place] [address] from [facility] will contact you regarding times to come to your home for therapies. [phone] Saranya Copa, DO In 2 days BMP / Mg /Phos due q weely next due on 1/9/18, Consider Diamox )alternative to bumex for diuresis) if bicarb level high on BMP  [address] [place] [address] 
[phone] Demetria Anthony MD In 1 week CHF management 609 [facility] [address] [facility] 
[phone] [facility] [address].  Sleep study Pls order referral 1000 [facility] 102 [address] [place] [id number] Discharge Orders None A check jocelyn indicates which time of day the medication should be taken. My Medications START taking these medications Instructions Each Dose to Equal  
 Morning Noon Evening Bedtime  
 glipiZIDE 5 mg tablet Commonly known as:  Louana Geneva Your last dose was: Your next dose is: Take 1 Tab by mouth daily (with dinner). 5 mg  
    
   
   
   
  
 magnesium oxide 400 mg tablet Commonly known as:  MAG-OX Your last dose was: Your next dose is: Take 2 Tabs by mouth two (2) times a day. Take with bumex 800 mg CHANGE how you take these medications Instructions Each Dose to Equal  
 Morning Noon Evening Bedtime  
 bumetanide 2 mg tablet Commonly known as:  [place] What changed:  Another medication with the same name was removed. Continue taking this medication, and follow the directions you see here. Your last dose was: Your next dose is: Take 1 Tab by mouth two (2) times a day. 2 mg COREG 6.25 mg tablet Generic drug:  carvedilol What changed:  Another medication with the same name was removed. Continue taking this medication, and follow the directions you see here. Your last dose was: Your next dose is: Take 6.25 mg by mouth two (2) times daily (with meals). 6.25 mg  
    
   
   
   
  
 potassium chloride SR 20 mEq tablet Commonly known as:  K-TAB What changed:   
- medication strength 
- how much to take - when to take this 
- additional instructions Your last dose was: Your next dose is: Take 3 Tabs by mouth two (2) times a day. Take with bumex 60 mEq CONTINUE taking these medications Instructions Each Dose to Equal  
 Morning Noon Evening Bedtime  
 acetaminophen 500 mg tablet Commonly known as:  TYLENOL Your last dose was: Your next dose is: Take 500 mg by mouth every four (4) hours. Indications: BACK PAIN  
 500 mg  
    
   
   
   
  
 * albuterol 90 mcg/actuation inhaler Commonly known as:  PROVENTIL HFA, VENTOLIN HFA, PROAIR HFA Your last dose was: Your next dose is: Take 2 Puffs by inhalation every four (4) hours as needed for Wheezing or Shortness of Breath. 2 Puff * albuterol 2.5 mg /3 mL (0.083 %) nebulizer solution Commonly known as:  PROVENTIL VENTOLIN Your last dose was: Your next dose is:    
   
   
 3 mL by Nebulization route every four (4) hours as needed for Wheezing or Shortness of Breath. 2.5 mg  
    
   
   
   
  
 aspirin delayed-release 81 mg tablet Your last dose was: Your next dose is: Take 81 mg by mouth daily. 81 mg  
    
   
   
   
  
 atorvastatin 80 mg tablet Commonly known as:  LIPITOR Your last dose was: Your next dose is: Take 80 mg by mouth daily. 80 mg  
    
   
   
   
  
 benzonatate 100 mg capsule Commonly known as:  TESSALON Your last dose was: Your next dose is: Take 1 Cap by mouth three (3) times daily as needed for Cough. 100 mg  
    
   
   
   
  
 budesonide-formoterol 160-4.5 mcg/actuation Hfaa Commonly known as:  SYMBICORT Your last dose was: Your next dose is: Take 2 Puffs by inhalation two (2) times a day. 2 Puff  
    
   
   
   
  
 cholecalciferol 1,000 unit tablet Commonly known as:  VITAMIN D3 Your last dose was: Your next dose is: Take 1,000 Units by mouth daily. 1000 Units  
    
   
   
   
  
 clopidogrel 75 mg Tab Commonly known as:  PLAVIX Your last dose was: Your next dose is: Take 75 mg by mouth daily. 75 mg  
    
   
   
   
  
 ferrous sulfate 325 mg (65 mg iron) tablet Your last dose was: Your next dose is: Take 325 mg by mouth two (2) times daily (with meals). 325 mg  
    
   
   
   
  
 fluticasone 50 mcg/actuation nasal spray Commonly known as:  Myra Mendoza Your last dose was: Your next dose is: 2 Sprays by Both Nostrils route daily. 2 Spray  
    
   
   
   
  
 gabapentin 300 mg capsule Commonly known as:  NEURONTIN Your last dose was: Your next dose is: Take 300 mg by mouth three (3) times daily. 300 mg  
    
   
   
   
  
 ipratropium 0.02 % Soln Commonly known as:  ATROVENT Your last dose was: Your next dose is:    
   
   
 2.5 mL by Nebulization route every six (6) hours as needed for Wheezing.   
 0.5 mg  
    
   
 isosorbide mononitrate ER 30 mg tablet Commonly known as:  IMDUR Your last dose was: Your next dose is: Take 30 mg by mouth daily. 30 mg  
    
   
   
   
  
 JANUVIA 100 mg tablet Generic drug:  SITagliptin Your last dose was: Your next dose is: Take 100 mg by mouth daily. 100 mg  
    
   
   
   
  
 LANTUS 100 unit/mL injection Generic drug:  insulin glargine Your last dose was: Your next dose is:    
   
   
 30 Units by SubCUTAneous route nightly. 30 Units  
    
   
   
   
  
 loratadine 10 mg tablet Commonly known as:  Katheren Gift Your last dose was: Your next dose is: Take 10 mg by mouth daily as needed for Allergies or Rhinitis. 10 mg  
    
   
   
   
  
 montelukast 10 mg tablet Commonly known as:  SINGULAIR Your last dose was: Your next dose is: Take 10 mg by mouth daily. 10 mg  
    
   
   
   
  
 nitroglycerin 0.4 mg SL tablet Commonly known as:  NITROSTAT Your last dose was: Your next dose is:    
   
   
 by SubLINGual route every five (5) minutes as needed for Chest Pain. omeprazole 20 mg capsule Commonly known as:  PRILOSEC Your last dose was: Your next dose is: Take 20 mg by mouth Before breakfast and dinner. 20 mg  
    
   
   
   
  
 sertraline 100 mg tablet Commonly known as:  ZOLOFT Your last dose was: Your next dose is: Take 100 mg by mouth daily. 100 mg SPIRIVA WITH HANDIHALER 18 mcg inhalation capsule Generic drug:  tiotropium Your last dose was: Your next dose is: Take 1 Cap by inhalation daily. 1 Cap  
    
   
   
   
  
 spironolactone 25 mg tablet Commonly known as:  ALDACTONE Your last dose was: Your next dose is: Take 25 mg by mouth daily. 25 mg  
    
   
   
   
  
 traMADol 50 mg tablet Commonly known as:  ULTRAM  
   
Your last dose was: Your next dose is: Take 50 mg by mouth three (3) times daily as needed (back pain). 50 mg  
    
   
   
   
  
 * Notice: This list has 2 medication(s) that are the same as other medications prescribed for you. Read the directions carefully, and ask your doctor or other care provider to review them with you. STOP taking these medications Magnesium 300 mg Cap Generic drug:  magnesium oxide-mg aa chelate  
   
  
 metOLazone 2.5 mg tablet Commonly known as:  Jasmyne Luis Where to Get Your Medications Information on where to get these meds will be given to you by the nurse or doctor. ! Ask your nurse or doctor about these medications  
  bumetanide 2 mg tablet  
 glipiZIDE 5 mg tablet  
 magnesium oxide 400 mg tablet  
 potassium chloride SR 20 mEq tablet Discharge Instructions [facility]  
[address] [place] [address] Diane Nguyen MD 
[phone] Discharge Instructions PATIENT ID: Bishop Brown MRN: [id number] YOB: 1950 DATE OF ADMISSION: 1/4/2018  9:42 PM   
DATE OF DISCHARGE: 1/6/2018 PRIMARY CARE PROVIDER: Adriana Nunez DO  
 
 
DISCHARGING PHYSICIAN: Diane Nguyen MD   
To contact this individual call [phone]. DISCHARGE DIAGNOSES Acute low K, Low Na, contraction alkalosis CONSULTATIONS: IP CONSULT TO PRIMARY CARE PROVIDER PROCEDURES/SURGERIES: * No surgery found * PENDING TEST RESULTS:  
At the time of discharge the following test results are still pending: None FOLLOW UP APPOINTMENTS:  
Follow-up Information Follow up With Details Comments Contact Info  [address] [place] [address] from [facility] contact you regarding times to come to your home for therapies. [phone] Leonardo Tatum, DO In 2 days BMP / Mg /Phos due q weely next due on 1/9/18, Consider Diamox )alternative to bumex for diuresis) if bicarb level high on BMP  [address] [place] 
[phone] Sabi Galaviz MD In 1 week CHF management [address] 
[phone] [facility] [address].  Sleep study Pls order referral 1000 [facility] [address] [place] [address] [place] [address] ADDITIONAL CARE RECOMMENDATIONS:  
FSBS check qid before meals & at bedtime, carry record for PCP office. Daily weight, write trend, carry record to PCP office. DIET: Cardiac Diet and Diabetic Diet, Limit po fluid to 2L/day, avoid conc sweet ACTIVITY: Activity as tolerated WOUND CARE: None EQUIPMENT needed: None DISCHARGE MEDICATIONS: 
 See Medication Reconciliation Form · It is important that you take the medication exactly as they are prescribed. · Keep your medication in the bottles provided by the pharmacist and keep a list of the medication names, dosages, and times to be taken in your wallet. · Do not take other medications without consulting your doctor. NOTIFY YOUR PHYSICIAN FOR ANY OF THE FOLLOWING:  
Fever over 101 degrees for 24 hours. Chest pain, shortness of breath, fever, chills, nausea, vomiting, diarrhea, change in mentation, falling, weakness, bleeding. Severe pain or pain not relieved by medications. Or, any other signs or symptoms that you may have questions about. DISPOSITION: 
x  Home With: 
x OT x PT x HH  RN  
  
 SNF/Inpatient Rehab/LTAC Independent/assisted living Hospice Other: CDMP Checked:  
Yes Y Signed:  
MD Basil 
1/6/2018 12:39 PM 
 
  
  
  
Introducing [facility]!    
 Nini Jordan introduces [employer] patient portal. Now you can access parts of your medical record, email your doctor's office, and request medication refills online. 1. In your internet browser, go to [email or web address] 2. Click on the First Time User? Click Here link in the Sign In box. You will see the New Member Sign Up page. 3. Enter your "[employer] Access Code exactly as it appears below. You will not need to use this code after youve completed the sign-up process. If you do not sign up before the expiration date, you must request a new code. · "[employer] Access Code: [email or web address] Expires: 3/30/2018  5:37 PM 
 
4. Enter the last four digits of your Social Security Number (xxxx) and Date of Birth (mm/dd/yyyy) as indicated and click Submit. You will be taken to the next sign-up page. 5. Create a "[employer] ID. This will be your "[employer] login ID and cannot be changed, so think of one that is secure and easy to remember. 6. Create a "[employer] password. You can change your password at any time. 7. Enter your Password Reset Question and Answer. This can be used at a later time if you forget your password. 8. Enter your e-mail address. You will receive e-mail notification when new information is available in [address] 9. Click Sign Up. You can now view and download portions of your medical record. 10. Click the Download Summary menu link to download a portable copy of your medical information. If you have questions, please visit the Frequently Asked Questions section of the "[employer] website. Remember, "[employer] is NOT to be used for urgent needs. For medical emergencies, dial 911. Now available from your iPhone and Android! Providers Seen During Your Hospitalization Provider Specialty Primary office phone Ruth Sparrow MD Emergency Medicine [phone] Julieta Ocasio MD Internal Medicine [phone] Your Primary Care Physician (PCP) Primary Care Physician Office Phone Office Fax Pineda Clay, [address] [phone] You are allergic to the following Allergen Reactions Garlic Shortness of Breath Metformin Nausea and Vomiting Pcn (Penicillins) Hives Recent Documentation Height Weight BMI OB Status Smoking Status 1.524 m 97.5 kg 41.99 kg/m2 Postmenopausal Current Every Day Smoker Emergency Contacts Name Discharge Info Relation Home Work Mobile Polo Martin DISCHARGE CAREGIVER [3] Son [22] [phone] Patient Belongings The following personal items are in your possession at time of discharge: 
  Dental Appliances: None  Visual Aid: Glasses, At home      Home Medications: None   Jewelry: Ring (several)  Clothing: At bedside    Other Valuables: None Please provide this summary of care documentation to your next provider. Signatures-by signing, you are acknowledging that this After Visit Summary has been reviewed with you and you have received a copy. Patient Signature:  ____________________________________________________________ Date:  ____________________________________________________________  
  
Yun Pillai Provider Signature:  ____________________________________________________________ Date:  ____________________________________________________________

## 2018-01-04 NOTE — IP AVS SNAPSHOT
[address] 
[phone] Patient: Virginia Orta MRN: [id number] [facility]:5/0/8287 A check jocelyn indicates which time of day the medication should be taken. My Medications START taking these medications Instructions Each Dose to Equal  
 Morning Noon Evening Bedtime  
 glipiZIDE 5 mg tablet Commonly known as:  Gala Quintero Your last dose was: Your next dose is: Take 1 Tab by mouth daily (with dinner). 5 mg  
    
   
   
   
  
 magnesium oxide 400 mg tablet Commonly known as:  MAG-OX Your last dose was: Your next dose is: Take 2 Tabs by mouth two (2) times a day. Take with bumex 800 mg CHANGE how you take these medications Instructions Each Dose to Equal  
 Morning Noon Evening Bedtime  
 bumetanide 2 mg tablet Commonly known as:  Rubina Adame What changed:  Another medication with the same name was removed. Continue taking this medication, and follow the directions you see here. Your last dose was: Your next dose is: Take 1 Tab by mouth two (2) times a day. 2 mg COREG 6.25 mg tablet Generic drug:  carvedilol What changed:  Another medication with the same name was removed. Continue taking this medication, and follow the directions you see here. Your last dose was: Your next dose is: Take 6.25 mg by mouth two (2) times daily (with meals). 6.25 mg  
    
   
   
   
  
 potassium chloride SR 20 mEq tablet Commonly known as:  K-TAB What changed:   
- medication strength 
- how much to take - when to take this 
- additional instructions Your last dose was: Your next dose is: Take 3 Tabs by mouth two (2) times a day. Take with bumex 60 mEq CONTINUE taking these medications Instructions Each Dose to Equal  
 Morning Noon Evening Bedtime  
 acetaminophen 500 mg tablet Commonly known as:  TYLENOL Your last dose was: Your next dose is: Take 500 mg by mouth every four (4) hours. Indications: BACK PAIN  
 500 mg  
    
   
   
   
  
 * albuterol 90 mcg/actuation inhaler Commonly known as:  PROVENTIL HFA, VENTOLIN HFA, PROAIR HFA Your last dose was: Your next dose is: Take 2 Puffs by inhalation every four (4) hours as needed for Wheezing or Shortness of Breath. 2 Puff * albuterol 2.5 mg /3 mL (0.083 %) nebulizer solution Commonly known as:  PROVENTIL VENTOLIN Your last dose was: Your next dose is:    
   
   
 3 mL by Nebulization route every four (4) hours as needed for Wheezing or Shortness of Breath. 2.5 mg  
    
   
   
   
  
 aspirin delayed-release 81 mg tablet Your last dose was: Your next dose is: Take 81 mg by mouth daily. 81 mg  
    
   
   
   
  
 atorvastatin 80 mg tablet Commonly known as:  LIPITOR Your last dose was: Your next dose is: Take 80 mg by mouth daily. 80 mg  
    
   
   
   
  
 benzonatate 100 mg capsule Commonly known as:  TESSALON Your last dose was: Your next dose is: Take 1 Cap by mouth three (3) times daily as needed for Cough. 100 mg  
    
   
   
   
  
 budesonide-formoterol 160-4.5 mcg/actuation Hfaa Commonly known as:  SYMBICORT Your last dose was: Your next dose is: Take 2 Puffs by inhalation two (2) times a day. 2 Puff  
    
   
   
   
  
 cholecalciferol 1,000 unit tablet Commonly known as:  VITAMIN D3 Your last dose was: Your next dose is: Take 1,000 Units by mouth daily. 1000 Units  
    
   
   
   
  
 clopidogrel 75 mg Tab Commonly known as:  PLAVIX Your last dose was: Your next dose is: Take 75 mg by mouth daily. 75 mg  
    
   
   
   
  
 ferrous sulfate 325 mg (65 mg iron) tablet Your last dose was: Your next dose is: Take 325 mg by mouth two (2) times daily (with meals). 325 mg  
    
   
   
   
  
 fluticasone 50 mcg/actuation nasal spray Commonly known as:  Era Leland Your last dose was: Your next dose is: 2 Sprays by Both Nostrils route daily. 2 Spray  
    
   
   
   
  
 gabapentin 300 mg capsule Commonly known as:  NEURONTIN Your last dose was: Your next dose is: Take 300 mg by mouth three (3) times daily. 300 mg  
    
   
   
   
  
 ipratropium 0.02 % Soln Commonly known as:  ATROVENT Your last dose was: Your next dose is:    
   
   
 2.5 mL by Nebulization route every six (6) hours as needed for Wheezing. 0.5 mg  
    
   
   
   
  
 isosorbide mononitrate ER 30 mg tablet Commonly known as:  IMDUR Your last dose was: Your next dose is: Take 30 mg by mouth daily. 30 mg  
    
   
   
   
  
 JANUVIA 100 mg tablet Generic drug:  SITagliptin Your last dose was: Your next dose is: Take 100 mg by mouth daily. 100 mg  
    
   
   
   
  
 LANTUS 100 unit/mL injection Generic drug:  insulin glargine Your last dose was: Your next dose is:    
   
   
 30 Units by SubCUTAneous route nightly. 30 Units  
    
   
   
   
  
 loratadine 10 mg tablet Commonly known as:  Maksim Christopher Your last dose was: Your next dose is: Take 10 mg by mouth daily as needed for Allergies or Rhinitis. 10 mg  
    
   
   
   
  
 montelukast 10 mg tablet Commonly known as:  SINGULAIR Your last dose was: Your next dose is: Take 10 mg by mouth daily. 10 mg  
    
   
   
   
  
 nitroglycerin 0.4 mg SL tablet Commonly known as:  NITROSTAT Your last dose was: Your next dose is:    
   
   
 by SubLINGual route every five (5) minutes as needed for Chest Pain. omeprazole 20 mg capsule Commonly known as:  PRILOSEC Your last dose was: Your next dose is: Take 20 mg by mouth Before breakfast and dinner. 20 mg  
    
   
   
   
  
 sertraline 100 mg tablet Commonly known as:  ZOLOFT Your last dose was: Your next dose is: Take 100 mg by mouth daily. 100 mg SPIRIVA WITH HANDIHALER 18 mcg inhalation capsule Generic drug:  tiotropium Your last dose was: Your next dose is: Take 1 Cap by inhalation daily. 1 Cap  
    
   
   
   
  
 spironolactone 25 mg tablet Commonly known as:  ALDACTONE Your last dose was: Your next dose is: Take 25 mg by mouth daily. 25 mg  
    
   
   
   
  
 traMADol 50 mg tablet Commonly known as:  ULTRAM  
   
Your last dose was: Your next dose is: Take 50 mg by mouth three (3) times daily as needed (back pain). 50 mg  
    
   
   
   
  
 * Notice: This list has 2 medication(s) that are the same as other medications prescribed for you. Read the directions carefully, and ask your doctor or other care provider to review them with you. STOP taking these medications Magnesium 300 mg Cap Generic drug:  magnesium oxide-mg aa chelate  
   
  
 metOLazone 2.5 mg tablet Commonly known as:  Jarett Grace Where to Get Your Medications Information on where to get these meds will be given to you by the nurse or doctor. ! Ask your nurse or doctor about these medications  
  bumetanide 2 mg tablet  
 glipiZIDE 5 mg tablet  
 magnesium oxide 400 mg tablet  
 potassium chloride SR 20 mEq tablet

## 2018-01-05 LAB
ANION GAP SERPL CALC-SCNC: 6 MMOL/L (ref 5–15)
ANION GAP SERPL CALC-SCNC: 6 MMOL/L (ref 5–15)
APPEARANCE UR: CLEAR
ATRIAL RATE: 93 BPM
BACTERIA URNS QL MICRO: NEGATIVE /HPF
BILIRUB UR QL: NEGATIVE
BUN SERPL-MCNC: 37 MG/DL (ref 6–20)
BUN SERPL-MCNC: 40 MG/DL (ref 6–20)
BUN/CREAT SERPL: 27 (ref 12–20)
BUN/CREAT SERPL: 31 (ref 12–20)
CALCIUM SERPL-MCNC: 8.5 MG/DL (ref 8.5–10.1)
CALCIUM SERPL-MCNC: 8.6 MG/DL (ref 8.5–10.1)
CALCULATED P AXIS, ECG09: 43 DEGREES
CALCULATED R AXIS, ECG10: 26 DEGREES
CALCULATED T AXIS, ECG11: 29 DEGREES
CHLORIDE SERPL-SCNC: 80 MMOL/L (ref 97–108)
CHLORIDE SERPL-SCNC: 85 MMOL/L (ref 97–108)
CO2 SERPL-SCNC: 39 MMOL/L (ref 21–32)
CO2 SERPL-SCNC: 41 MMOL/L (ref 21–32)
COLOR UR: NORMAL
CREAT SERPL-MCNC: 1.31 MG/DL (ref 0.55–1.02)
CREAT SERPL-MCNC: 1.35 MG/DL (ref 0.55–1.02)
DIAGNOSIS, 93000: NORMAL
EPITH CASTS URNS QL MICRO: NORMAL /LPF
GLUCOSE BLD STRIP.AUTO-MCNC: 231 MG/DL (ref 65–100)
GLUCOSE BLD STRIP.AUTO-MCNC: 243 MG/DL (ref 65–100)
GLUCOSE BLD STRIP.AUTO-MCNC: 261 MG/DL (ref 65–100)
GLUCOSE BLD STRIP.AUTO-MCNC: 380 MG/DL (ref 65–100)
GLUCOSE SERPL-MCNC: 208 MG/DL (ref 65–100)
GLUCOSE SERPL-MCNC: 251 MG/DL (ref 65–100)
GLUCOSE UR STRIP.AUTO-MCNC: NEGATIVE MG/DL
HGB UR QL STRIP: NEGATIVE
HYALINE CASTS URNS QL MICRO: NORMAL /LPF (ref 0–5)
KETONES UR QL STRIP.AUTO: NEGATIVE MG/DL
LEUKOCYTE ESTERASE UR QL STRIP.AUTO: NEGATIVE
MAGNESIUM SERPL-MCNC: 1.7 MG/DL (ref 1.6–2.4)
MAGNESIUM SERPL-MCNC: 1.8 MG/DL (ref 1.6–2.4)
NITRITE UR QL STRIP.AUTO: NEGATIVE
P-R INTERVAL, ECG05: 124 MS
PH UR STRIP: 6 [PH] (ref 5–8)
POTASSIUM SERPL-SCNC: 2.9 MMOL/L (ref 3.5–5.1)
POTASSIUM SERPL-SCNC: 3.2 MMOL/L (ref 3.5–5.1)
PROT UR STRIP-MCNC: NEGATIVE MG/DL
Q-T INTERVAL, ECG07: 402 MS
QRS DURATION, ECG06: 120 MS
QTC CALCULATION (BEZET), ECG08: 499 MS
RBC #/AREA URNS HPF: NORMAL /HPF (ref 0–5)
SERVICE CMNT-IMP: ABNORMAL
SODIUM SERPL-SCNC: 127 MMOL/L (ref 136–145)
SODIUM SERPL-SCNC: 130 MMOL/L (ref 136–145)
SP GR UR REFRACTOMETRY: 1.01 (ref 1–1.03)
TROPONIN I SERPL-MCNC: <0.04 NG/ML
UA: UC IF INDICATED,UAUC: NORMAL
UROBILINOGEN UR QL STRIP.AUTO: 0.2 EU/DL (ref 0.2–1)
VENTRICULAR RATE, ECG03: 93 BPM
WBC URNS QL MICRO: NORMAL /HPF (ref 0–4)

## 2018-01-05 PROCEDURE — 94640 AIRWAY INHALATION TREATMENT: CPT

## 2018-01-05 PROCEDURE — 99218 HC RM OBSERVATION: CPT

## 2018-01-05 PROCEDURE — 83735 ASSAY OF MAGNESIUM: CPT | Performed by: INTERNAL MEDICINE

## 2018-01-05 PROCEDURE — 77030029684 HC NEB SM VOL KT MONA -A

## 2018-01-05 PROCEDURE — 96361 HYDRATE IV INFUSION ADD-ON: CPT

## 2018-01-05 PROCEDURE — 97161 PT EVAL LOW COMPLEX 20 MIN: CPT

## 2018-01-05 PROCEDURE — 82962 GLUCOSE BLOOD TEST: CPT

## 2018-01-05 PROCEDURE — 74011000250 HC RX REV CODE- 250: Performed by: INTERNAL MEDICINE

## 2018-01-05 PROCEDURE — G8978 MOBILITY CURRENT STATUS: HCPCS

## 2018-01-05 PROCEDURE — 96372 THER/PROPH/DIAG INJ SC/IM: CPT

## 2018-01-05 PROCEDURE — 97535 SELF CARE MNGMENT TRAINING: CPT

## 2018-01-05 PROCEDURE — 80048 BASIC METABOLIC PNL TOTAL CA: CPT | Performed by: INTERNAL MEDICINE

## 2018-01-05 PROCEDURE — G8987 SELF CARE CURRENT STATUS: HCPCS

## 2018-01-05 PROCEDURE — 97116 GAIT TRAINING THERAPY: CPT

## 2018-01-05 PROCEDURE — 74011636637 HC RX REV CODE- 636/637: Performed by: INTERNAL MEDICINE

## 2018-01-05 PROCEDURE — G8979 MOBILITY GOAL STATUS: HCPCS

## 2018-01-05 PROCEDURE — G8988 SELF CARE GOAL STATUS: HCPCS

## 2018-01-05 PROCEDURE — 84484 ASSAY OF TROPONIN QUANT: CPT | Performed by: INTERNAL MEDICINE

## 2018-01-05 PROCEDURE — 36415 COLL VENOUS BLD VENIPUNCTURE: CPT | Performed by: INTERNAL MEDICINE

## 2018-01-05 PROCEDURE — 97165 OT EVAL LOW COMPLEX 30 MIN: CPT

## 2018-01-05 PROCEDURE — 74011250637 HC RX REV CODE- 250/637: Performed by: INTERNAL MEDICINE

## 2018-01-05 PROCEDURE — 74011250636 HC RX REV CODE- 250/636: Performed by: INTERNAL MEDICINE

## 2018-01-05 RX ORDER — POTASSIUM CHLORIDE 750 MG/1
40 TABLET, FILM COATED, EXTENDED RELEASE ORAL
Status: COMPLETED | OUTPATIENT
Start: 2018-01-05 | End: 2018-01-05

## 2018-01-05 RX ORDER — POTASSIUM CHLORIDE 750 MG/1
20 TABLET, FILM COATED, EXTENDED RELEASE ORAL DAILY
Status: ON HOLD | COMMUNITY
End: 2018-01-06

## 2018-01-05 RX ORDER — METOLAZONE 2.5 MG/1
2.5 TABLET ORAL DAILY
COMMUNITY
End: 2018-01-06

## 2018-01-05 RX ORDER — INSULIN GLARGINE 100 [IU]/ML
30 INJECTION, SOLUTION SUBCUTANEOUS
COMMUNITY

## 2018-01-05 RX ORDER — LANOLIN ALCOHOL/MO/W.PET/CERES
325 CREAM (GRAM) TOPICAL 2 TIMES DAILY WITH MEALS
COMMUNITY

## 2018-01-05 RX ORDER — TRAMADOL HYDROCHLORIDE 50 MG/1
50 TABLET ORAL
Status: DISCONTINUED | OUTPATIENT
Start: 2018-01-05 | End: 2018-01-06 | Stop reason: HOSPADM

## 2018-01-05 RX ORDER — INSULIN LISPRO 100 [IU]/ML
10 INJECTION, SOLUTION INTRAVENOUS; SUBCUTANEOUS ONCE
Status: COMPLETED | OUTPATIENT
Start: 2018-01-05 | End: 2018-01-05

## 2018-01-05 RX ORDER — POTASSIUM CHLORIDE 750 MG/1
40 TABLET, FILM COATED, EXTENDED RELEASE ORAL 2 TIMES DAILY
Status: DISCONTINUED | OUTPATIENT
Start: 2018-01-05 | End: 2018-01-06 | Stop reason: HOSPADM

## 2018-01-05 RX ORDER — LANOLIN ALCOHOL/MO/W.PET/CERES
800 CREAM (GRAM) TOPICAL DAILY
Status: DISCONTINUED | OUTPATIENT
Start: 2018-01-05 | End: 2018-01-06 | Stop reason: HOSPADM

## 2018-01-05 RX ORDER — NITROGLYCERIN 0.4 MG/1
TABLET SUBLINGUAL
COMMUNITY

## 2018-01-05 RX ORDER — ENOXAPARIN SODIUM 100 MG/ML
40 INJECTION SUBCUTANEOUS EVERY 24 HOURS
Status: DISCONTINUED | OUTPATIENT
Start: 2018-01-05 | End: 2018-01-06 | Stop reason: HOSPADM

## 2018-01-05 RX ORDER — POTASSIUM CHLORIDE 750 MG/1
20 TABLET, FILM COATED, EXTENDED RELEASE ORAL
Status: COMPLETED | OUTPATIENT
Start: 2018-01-05 | End: 2018-01-05

## 2018-01-05 RX ORDER — KETOTIFEN FUMARATE 0.35 MG/ML
1 SOLUTION/ DROPS OPHTHALMIC
COMMUNITY
End: 2018-01-05

## 2018-01-05 RX ORDER — ISOSORBIDE MONONITRATE 30 MG/1
30 TABLET, EXTENDED RELEASE ORAL DAILY
COMMUNITY

## 2018-01-05 RX ORDER — BUMETANIDE 2 MG/1
2 TABLET ORAL 2 TIMES DAILY
Status: ON HOLD | COMMUNITY
End: 2018-01-06

## 2018-01-05 RX ORDER — SPIRONOLACTONE 25 MG/1
25 TABLET ORAL DAILY
COMMUNITY
End: 2019-11-27

## 2018-01-05 RX ORDER — CARVEDILOL 6.25 MG/1
6.25 TABLET ORAL 2 TIMES DAILY WITH MEALS
Status: ON HOLD | COMMUNITY
End: 2019-11-27 | Stop reason: SDUPTHER

## 2018-01-05 RX ORDER — TRAMADOL HYDROCHLORIDE 50 MG/1
50 TABLET ORAL
COMMUNITY

## 2018-01-05 RX ORDER — INSULIN GLARGINE 100 [IU]/ML
30 INJECTION, SOLUTION SUBCUTANEOUS
Status: DISCONTINUED | OUTPATIENT
Start: 2018-01-05 | End: 2018-01-06 | Stop reason: HOSPADM

## 2018-01-05 RX ADMIN — BUMETANIDE 1 MG: 1 TABLET ORAL at 07:49

## 2018-01-05 RX ADMIN — POTASSIUM CHLORIDE 40 MEQ: 750 TABLET, FILM COATED, EXTENDED RELEASE ORAL at 21:07

## 2018-01-05 RX ADMIN — POTASSIUM CHLORIDE 40 MEQ: 750 TABLET, FILM COATED, EXTENDED RELEASE ORAL at 17:21

## 2018-01-05 RX ADMIN — SODIUM CHLORIDE TAB 1 GM 1 G: 1 TAB at 17:22

## 2018-01-05 RX ADMIN — BUDESONIDE 500 MCG: 0.5 INHALANT RESPIRATORY (INHALATION) at 20:43

## 2018-01-05 RX ADMIN — BUMETANIDE 1 MG: 1 TABLET ORAL at 17:21

## 2018-01-05 RX ADMIN — CARVEDILOL 6.25 MG: 6.25 TABLET, FILM COATED ORAL at 07:42

## 2018-01-05 RX ADMIN — INSULIN LISPRO 3 UNITS: 100 INJECTION, SOLUTION INTRAVENOUS; SUBCUTANEOUS at 07:49

## 2018-01-05 RX ADMIN — INSULIN LISPRO 3 UNITS: 100 INJECTION, SOLUTION INTRAVENOUS; SUBCUTANEOUS at 17:22

## 2018-01-05 RX ADMIN — ARFORMOTEROL TARTRATE 15 MCG: 15 SOLUTION RESPIRATORY (INHALATION) at 08:06

## 2018-01-05 RX ADMIN — CARVEDILOL 6.25 MG: 6.25 TABLET, FILM COATED ORAL at 17:22

## 2018-01-05 RX ADMIN — GABAPENTIN 300 MG: 300 CAPSULE ORAL at 21:07

## 2018-01-05 RX ADMIN — MONTELUKAST SODIUM 10 MG: 10 TABLET, FILM COATED ORAL at 09:49

## 2018-01-05 RX ADMIN — SODIUM CHLORIDE 100 ML/HR: 900 INJECTION, SOLUTION INTRAVENOUS at 00:22

## 2018-01-05 RX ADMIN — POTASSIUM CHLORIDE 40 MEQ: 750 TABLET, FILM COATED, EXTENDED RELEASE ORAL at 09:48

## 2018-01-05 RX ADMIN — SERTRALINE HYDROCHLORIDE 100 MG: 50 TABLET ORAL at 09:49

## 2018-01-05 RX ADMIN — ISOSORBIDE MONONITRATE 30 MG: 30 TABLET, EXTENDED RELEASE ORAL at 09:49

## 2018-01-05 RX ADMIN — FERROUS SULFATE TAB 325 MG (65 MG ELEMENTAL FE) 325 MG: 325 (65 FE) TAB at 17:22

## 2018-01-05 RX ADMIN — DOCUSATE SODIUM 100 MG: 100 CAPSULE, LIQUID FILLED ORAL at 09:48

## 2018-01-05 RX ADMIN — DOCUSATE SODIUM 100 MG: 100 CAPSULE, LIQUID FILLED ORAL at 17:22

## 2018-01-05 RX ADMIN — INSULIN LISPRO 3 UNITS: 100 INJECTION, SOLUTION INTRAVENOUS; SUBCUTANEOUS at 21:09

## 2018-01-05 RX ADMIN — ISOSORBIDE MONONITRATE 30 MG: 30 TABLET, EXTENDED RELEASE ORAL at 17:21

## 2018-01-05 RX ADMIN — ASPIRIN 81 MG: 81 TABLET, COATED ORAL at 09:48

## 2018-01-05 RX ADMIN — INSULIN LISPRO 10 UNITS: 100 INJECTION, SOLUTION INTRAVENOUS; SUBCUTANEOUS at 12:21

## 2018-01-05 RX ADMIN — ATORVASTATIN CALCIUM 80 MG: 40 TABLET, FILM COATED ORAL at 09:48

## 2018-01-05 RX ADMIN — GABAPENTIN 300 MG: 300 CAPSULE ORAL at 09:48

## 2018-01-05 RX ADMIN — FLUTICASONE PROPIONATE 2 SPRAY: 50 SPRAY, METERED NASAL at 09:49

## 2018-01-05 RX ADMIN — INSULIN GLARGINE 30 UNITS: 100 INJECTION, SOLUTION SUBCUTANEOUS at 21:07

## 2018-01-05 RX ADMIN — FERROUS SULFATE TAB 325 MG (65 MG ELEMENTAL FE) 325 MG: 325 (65 FE) TAB at 07:49

## 2018-01-05 RX ADMIN — TRAMADOL HYDROCHLORIDE 50 MG: 50 TABLET, FILM COATED ORAL at 15:10

## 2018-01-05 RX ADMIN — ARFORMOTEROL TARTRATE 15 MCG: 15 SOLUTION RESPIRATORY (INHALATION) at 20:43

## 2018-01-05 RX ADMIN — GABAPENTIN 300 MG: 300 CAPSULE ORAL at 17:22

## 2018-01-05 RX ADMIN — Medication 800 MG: at 09:48

## 2018-01-05 RX ADMIN — ENOXAPARIN SODIUM 40 MG: 40 INJECTION SUBCUTANEOUS at 21:12

## 2018-01-05 RX ADMIN — SODIUM CHLORIDE TAB 1 GM 1 G: 1 TAB at 12:55

## 2018-01-05 RX ADMIN — SODIUM CHLORIDE 100 ML/HR: 900 INJECTION, SOLUTION INTRAVENOUS at 07:53

## 2018-01-05 RX ADMIN — CLOPIDOGREL BISULFATE 75 MG: 75 TABLET ORAL at 09:48

## 2018-01-05 RX ADMIN — POTASSIUM CHLORIDE 20 MEQ: 750 TABLET, FILM COATED, EXTENDED RELEASE ORAL at 00:23

## 2018-01-05 RX ADMIN — PANTOPRAZOLE SODIUM 40 MG: 40 TABLET, DELAYED RELEASE ORAL at 07:42

## 2018-01-05 RX ADMIN — ZOLPIDEM TARTRATE 5 MG: 5 TABLET ORAL at 21:19

## 2018-01-05 RX ADMIN — BUDESONIDE 500 MCG: 0.5 INHALANT RESPIRATORY (INHALATION) at 08:06

## 2018-01-05 RX ADMIN — SODIUM CHLORIDE TAB 1 GM 1 G: 1 TAB at 09:48

## 2018-01-05 NOTE — PROGRESS NOTES
Problem: Mobility Impaired (Adult and Pediatric)  Goal: *Acute Goals and Plan of Care (Insert Text)  Physical Therapy Goals  Initiated 1/5/2018  1. Patient will move from supine to sit and sit to supine  in bed with modified independence within 7 day(s). 2.  Patient will transfer from bed to chair and chair to bed with modified independence using the least restrictive device within 7 day(s). 3.  Patient will perform sit to stand with modified independence within 7 day(s). 4.  Patient will ambulate with modified independence for 150 feet with the least restrictive device within 7 day(s), keeping O2 >90% on RA.      physical Therapy EVALUATION  Patient: Lisbeth Meyer (63 y.o. female)  Date: 1/5/2018  Primary Diagnosis: Hypokalemia        Precautions:        ASSESSMENT :  Chart reviewed. Patient cleared to be seen in the ED, prior to being transported to the floor. Patient presents s/p 2 falls yesterday. No LOC. Patient stating that her knees got weak and she slid to the floor. No dizziness reported this AM. Strength equal bilaterally. No facial droop or slurred speech. Based on the objective data described below, the patient presents with generalized LE weakness and decreased activity tolerance. Patient found on bedside commode. Demonstrating standing x 10 minutes during dressing and toileting with rollator. O2 down to 89% on RA. BP stable. Patient ambulating with rollator, needing cueing to walk farther. Slow but steady gait pattern. Decreased step length and increased trunk sway observed. Upon return to bed, patient with minimal SOB, though O2 at 90% on RA. Cues for walker safety and hand placement. Recommend back to apartment [address] upon discharge. Will follow up Monday as appropriate. Patient will benefit from skilled intervention to address the above impairments.   Patients rehabilitation potential is considered to be Good  Factors which may influence rehabilitation potential include: [x]         None noted  []         Mental ability/status  []         Medical condition  []         Home/family situation and support systems  []         Safety awareness  []         Pain tolerance/management  []         Other:      PLAN :  Recommendations and Planned Interventions:  [x]           Bed Mobility Training             []    Neuromuscular Re-Education  [x]           Transfer Training                   []    Orthotic/Prosthetic Training  [x]           Gait Training                         []    Modalities  [x]           Therapeutic Exercises           []    Edema Management/Control  [x]           Therapeutic Activities            []    Patient and Family Training/Education  []           Other (comment):    Frequency/Duration: Patient will be followed by physical therapy  5 times a week to address goals. Discharge Recommendations: Home Health  Further Equipment Recommendations for Discharge: none     SUBJECTIVE:   Patient stated I haven't fallen before this. I dont know what's happening .     OBJECTIVE DATA SUMMARY:   HISTORY:    Past Medical History:   Diagnosis Date    Asthma     CAD (coronary artery disease)     Chronic kidney disease     Chronic obstructive pulmonary disease ([facility] [address]     H/O acute myocardial infarction     Heart failure (HCC)     Hypertension      Past Surgical History:   Procedure Laterality Date    HX CHOLECYSTECTOMY      gall stones removed    HX GYN       Prior Level of Function/Home Situation: Patient lives alone in a senior apartment. No stairs. No falls prior to two episodes yesterday. Uses a rollator for ambulation mod I. Granddaughter and family involved in patient care.    Personal factors and/or comorbidities impacting plan of care:     Home Situation  Home Environment: Apartment  # Steps to Enter: 0 (elevator access)  One/Two Story Residence: One story  Living Alone: Yes  Support Systems: Child(jarvis), Family member(s)  Patient Expects to be Discharged to[de-identified] Apartment  Current DME Used/Available at Home: [employer]    EXAMINATION/PRESENTATION/DECISION MAKING:     Strength:    Strength: Generally decreased, functional     Tone & Sensation:   Tone: Normal  Sensation: Intact      Coordination:  Coordination: Within functional limits  Vision:    WFL  Functional Mobility:  Bed Mobility:  Sit to Supine: Contact guard assistance     Transfers:  Sit to Stand: Contact guard assistance  Stand to Sit: Contact guard assistance     Balance:   Sitting: Intact  Standing: Impaired  Standing - Static: Good;Constant support  Standing - Dynamic : Fair  Ambulation/Gait Training:  Distance (ft): 100 Feet (ft)  Assistive Device: Gait belt;Walker, rollator  Ambulation - Level of Assistance: Stand-by asssistance; Adaptive equipment  Gait Abnormalities: Decreased step clearance;Trunk sway increased  Base of Support: Widened  Speed/Felisha: Slow  Step Length: Right shortened;Left shortened    Functional Measure:  10 Meter walk test:  (Specify if any supplemental oxygen is used, the type, pre, during and post sats.)    Self-Selected Or Fast-Velocity: Self Selected Velocity  Trial 1: 17.1 Seconds  Trial 2: 17.1 Seconds  Trial 3: 17.1 Seconds   Average : 17.1 Seconds  Score: 0.58 m/s             Walking Speed (m/s)  Modifier Scale Age 52-63 Age 61-76 Age 66-77 Age 80-80    Male Female Male Female Male Female Male Female   CH   0% Impaired ?  1.39 ? 1.40 ? 1.36 ? 1.30 ? 1.33 ? 1.27 ? 1.21 ? 1.15   CI   1-19% Impaired 1.11-1.38 1.12-1.39 1.09-1.35 1.04-1.29 1.06-1.32 1.01-1.26 0.96-1.20 0.92-1.14   CJ   20-39% Impaired 0.83-1.10 0.84-1.11 0.82-1.08 0.78-1.03 0.80-1.05 0.76-1.00 0.72-0.95 0.69-0.91   CK   40-59% Impaired 0.56-0.82 0.57-0.83 0.54-0.81 0.52-0.77 0.53-0.79 0.51-0.75 0.48-0.71 0.46-0.68   CL   60-79% Impaired 0.28-0.55 0.28-0.56 0.27-0.53 0.26-0.51 0.27-0.52 0.25-0.50 0.24-0.49 0.23-0.45   CM   80-99% Impaired 0.01-0.28 < 0.01-0.28 < 0.01-0.27 < 0.01-0.26 0.01-0.27 0.01-0.24 0.01-0.23 0. 01-0.22   CN   100% Impaired Cannot Perform   Minimal Detectable Change (MDC-90) = 0.1 m/s  Casey PINZON \"Comfortable and maximum walking speed of adults aged 20-79 years: reference values and determinants. \" Age and Agin Volume 26(1):15-9. Karthik Felton \"Age- and gender-related test performance in community-dwelling elderly people: Six-Minute Walk Test, Maguire Balance Scale, Timed Up & Go Test, and gait speeds. \" Physical Therapy: 2002 Volume 82(2):128-37. Naveen ISSA, Rowan SNOWDEN, Donn HAND, [facility] DOROTHY. \"Assessing stability and change of four performance measures: a longitudinal study evaluating outcome following total hip and knee arthroplasty. \" [facility] Musculoskeletal Disorders: 2005 Volume 6(3). Bernie Farnsworth, PhD; Eveline Vela, PhD. Robbi Lee Paper: \"Walking Speed: the Sixth Vital Sign\" Journal of Geriatric Physical Therapy: 2009 - Volume 32 - Issue 2 - p 2-5 . G codes: In compliance with CMSs Claims Based Outcome Reporting, the following G-code set was chosen for this patient based on their primary functional limitation being treated: The outcome measure chosen to determine the severity of the functional limitation was the 10 MWT with a score of 0.58 m/s which was correlated with the impairment scale.     ? Mobility - Walking and Moving Around:     - CURRENT STATUS: CK - 40%-59% impaired, limited or restricted    - GOAL STATUS: CJ - 20%-39% impaired, limited or restricted    - D/C STATUS:  ---------------To be determined---------------      Physical Therapy Evaluation Charge Determination   History Examination Presentation Decision-Making   LOW Complexity : Zero comorbidities / personal factors that will impact the outcome / POC LOW Complexity : 1-2 Standardized tests and measures addressing body structure, function, activity limitation and / or participation in recreation  LOW Complexity : Stable, uncomplicated  LOW Complexity : FOTO score of       Based on the above components, the patient evaluation is determined to be of the following complexity level: LOW     Pain:  Pain Scale 1: Numeric (0 - 10)  Pain Intensity 1: 0     Activity Tolerance:   Decreased. Minimal SOB observed with activity. O2 remaining around 89-90% on RA with activity. BPs stable with positional changes  Please refer to the flowsheet for vital signs taken during this treatment. After treatment:   []         Patient left in no apparent distress sitting up in chair  []         Patient left in no apparent distress in bed  []         Call bell left within reach  []         Nursing notified  []         Caregiver present  []         Bed alarm activated    COMMUNICATION/EDUCATION:   The patients plan of care was discussed with: Registered Nurse and OT  [x]         Fall prevention education was provided and the patient/caregiver indicated understanding. [x]         Patient/family have participated as able in goal setting and plan of care. [x]         Patient/family agree to work toward stated goals and plan of care. []         Patient understands intent and goals of therapy, but is neutral about his/her participation. []         Patient is unable to participate in goal setting and plan of care.     Thank you for this referral.  Ofelia Chavira  PT, DPT   Time Calculation: 25 mins

## 2018-01-05 NOTE — ED NOTES
Spoke with Madhuri, pt to only received 40 of Potassium orally and sodium chloride running at a 100 ml/hr

## 2018-01-05 NOTE — PROGRESS NOTES
Lovenox Monitoring  Indication: DVT Prophylaxis  Recent Labs      01/05/18   0400  01/04/18   2203   HGB   --   12.4   PLT   --   281   CREA  1.31*  1.40*     Current Weight: 96.3 kg  Est. CrCl = 42.7 ml/min  Current Dose: 30 mg subcutaneously every 24 hours. Plan: Change to 40 mg subcutaneously every 24 hours per [facility] P&T Committee Protocol with respect to renal function. Pharmacy will continue to monitor patient daily and will make dosage adjustments based upon changing renal function.     Vasiliy Marsh, PharmD, BCPS

## 2018-01-05 NOTE — ED NOTES
Bedside shift change report given to Kayla Awan RN (oncoming nurse) by Jesús Lyons RN (offgoing nurse). Report included the following information SBAR, ED Summary, Recent Results and Cardiac Rhythm NSR.

## 2018-01-05 NOTE — ED PROVIDER NOTES
Patient is a 75 y.o. female presenting with fatigue. Fatigue   There was left facial focality noted. Pertinent negatives include no speech difficulty. Associated symptoms include shortness of breath. Pertinent negatives include no chest pain, no vomiting, no headaches and no nausea. 75year old female with asthma, COPD, CAD, CHF, HTN, DM, presents with generalized weakness since this evening. States about 1-2 weeks ago her diuretics were increased due to fluid on her lungs. No fever. No chest pain. No cough/congestion/cold symptoms. Has had some increased SOB. NO leg swelling or calf pain. Tonight around 6pm she was in her kitchen standing for too long when her back started to hurt and then her legs felt weak. She made it to the bed room and she fell to the ground. EMS was called to assisted living to help her up. Did not loose consciousness or hit her head. No headache, no dizziness/lightheadness, no chest pain, palpitations prior to fall. Then around 8pm she went to the bathroom to change into her night clothes and again fell in the bathroom- same symptoms of generalized weakness. [facility] arrived for the second time they encouraged her to come here. She denies focal weakness or numbness. NO headache or dizziness. She reports decreased appetite but taking in plenty of fluids. Denies urinary symptoms. States this has never happened before. Review of records- patient had a NM stress test on 12/12 with no wall motion abnormality, EF 37%    Past Medical History:   Diagnosis Date    Asthma     CAD (coronary artery disease)     Chronic kidney disease     Chronic obstructive pulmonary disease ([address]     H/O acute myocardial infarction     Heart failure ([address]     Hypertension        Past Surgical History:   Procedure Laterality Date    HX CHOLECYSTECTOMY      gall stones removed    HX GYN           History reviewed. No pertinent family history.     Social History     Social History    Marital status: LEGALLY      Spouse name: N/A    Number of children: N/A    Years of education: N/A     Occupational History    Not on file. Social History Main Topics    Smoking status: Current Every Day Smoker    Smokeless tobacco: Never Used    Alcohol use No    Drug use: No    Sexual activity: Not on file     Other Topics Concern    Not on file     Social History Narrative         ALLERGIES: Garlic; Metformin; and Pcn [penicillins]    Review of Systems   Constitutional: Positive for appetite change and fatigue. Negative for chills and fever. HENT: Negative for congestion. Eyes: Negative for visual disturbance. Respiratory: Positive for shortness of breath. Negative for cough and chest tightness. Cardiovascular: Negative for chest pain, palpitations and leg swelling. Gastrointestinal: Negative for abdominal pain, diarrhea, nausea and vomiting. Endocrine: Negative for polyuria. Genitourinary: Negative for dysuria. Musculoskeletal: Positive for back pain and gait problem. Neurological: Positive for weakness. Negative for dizziness, syncope, facial asymmetry, speech difficulty, light-headedness, numbness and headaches. Psychiatric/Behavioral: Negative for dysphoric mood. Vitals:    01/04/18 2156   BP: 155/49   Pulse: 85   Resp: 17   Temp: 98.9 °F (37.2 °C)   SpO2: 90%   Weight: 96.6 kg (213 lb)   Height: 5' (1.524 m)            Physical Exam   Constitutional: She is oriented to person, place, and time. She appears well-developed and well-nourished. No distress. HENT:   Head: Normocephalic and atraumatic. Mouth/Throat: Oropharynx is clear and moist. No oropharyngeal exudate. Eyes: Conjunctivae and EOM are normal. Pupils are equal, round, and reactive to light. Right eye exhibits no discharge. Left eye exhibits no discharge. No scleral icterus. Neck: Normal range of motion. Neck supple. No JVD present.    Cardiovascular: Normal rate, regular rhythm, normal heart sounds and intact distal pulses. Exam reveals no gallop and no friction rub. No murmur heard. Pulmonary/Chest: Effort normal and breath sounds normal. No stridor. No respiratory distress. She has no wheezes. She has no rales. She exhibits no tenderness. Diminished bilaterally   Abdominal: Soft. Bowel sounds are normal. She exhibits no distension and no mass. There is no tenderness. There is no rebound and no guarding. Musculoskeletal: Normal range of motion. She exhibits no edema or tenderness. Neurological: She is alert and oriented to person, place, and time. She has normal reflexes. No cranial nerve deficit. She exhibits normal muscle tone. Coordination normal.   Skin: Skin is warm and dry. No rash noted. No erythema. Psychiatric: She has a normal mood and affect. Her behavior is normal. Judgment and thought content normal.        [facility]  ED Course       Procedures    ED EKG interpretation:  Rhythm: normal sinus rhythm; and regular . Rate (approx.): 93; Axis: normal; P wave: normal; QRS interval: prolonged; ST/T wave: non-specific changes; This EKG was interpreted by Paxton Rodríguez MD,ED Provider. low sodium, potassium, contraction alkalosis. Iv fluids, potassium replacement. Spoke with Dr. Quinten Castleman- will admit.

## 2018-01-05 NOTE — PROGRESS NOTES
Problem: Self Care Deficits Care Plan (Adult)  Goal: *Acute Goals and Plan of Care (Insert Text)  Occupational Therapy Goals  Initiated 1/5/2018  1. Patient will perform ADLs standing 5 mins without fatigue or LOB with modified independence within 7 day(s). 2.  Patient will perform lower body ADLs with modified independence within 7 day(s). 3.  Patient will perform bathing with modified independence within 7 day(s). 4.  Patient will perform toilet transfers with modified independence within 7 day(s). 5.  Patient will perform all aspects of toileting with independence within 7 day(s). 6.  Patient will participate in upper extremity therapeutic exercise/activities to increase independence with ADLs with independence for 5 minutes within 7 day(s). Occupational Therapy EVALUATION  Patient: Sowmya Wise (39 y.o. female)  Date: 1/5/2018  Primary Diagnosis: Hypokalemia        Precautions: Fall       ASSESSMENT :  Based on the objective data described below, the patient presents with overall CGA x1 with rollator for functional mobility, up to Max A for lower body ADLs, and independent for upper body ADLs s/p GLF. Patient received supine in bed with granddaughter present, cleared for therapy by nursing. Patient presenting with generalized decreased endurance and functional reach/balance for ADLs. Requiring Max A to pull up jeans and underwear upon initial stand, however patient appearing to be deconditioned after multiple falls and hospitalization - strength and balance did improve with continued ambulation and was able to demonstrate improved functional independence. Patient may benefit from education on hip kit if unable to fully recovery functional endurance and reach. Recommend HHOT at discharge to ensure safe home environment and integration of energy conservation techniques as she does live home alone (has supportive family).     Patient will benefit from skilled intervention to address the above impairments. Patients rehabilitation potential is considered to be Good  Factors which may influence rehabilitation potential include:   []             None noted  []             Mental ability/status  []             Medical condition  []             Home/family situation and support systems  []             Safety awareness  []             Pain tolerance/management  []             Other:      PLAN :  Recommendations and Planned Interventions:  [x]               Self Care Training                  [x]        Therapeutic Activities  [x]               Functional Mobility Training    []        Cognitive Retraining  [x]               Therapeutic Exercises           [x]        Endurance Activities  [x]               Balance Training                   []        Neuromuscular Re-Education  []               Visual/Perceptual Training     [x]   Home Safety Training  [x]               Patient Education                 [x]        Family Training/Education  []               Other (comment):    Frequency/Duration: Patient will be followed by occupational therapy 5 times a week to address goals. Discharge Recommendations: HHOT  Further Equipment Recommendations for Discharge: TBD by home health following safety eval - possible shower chair? SUBJECTIVE:   Patient stated I do everything myself.     OBJECTIVE DATA SUMMARY:   HISTORY:   Past Medical History:   Diagnosis Date    Asthma     CAD (coronary artery disease)     Chronic kidney disease     [facility] obstructive pulmonary disease ([facility] [address]     H/O acute myocardial infarction     Heart failure (HCC)     Hypertension      Past Surgical History:   Procedure Laterality Date    HX CHOLECYSTECTOMY      gall stones removed    HX GYN         Prior Level of Function/Environment/Context: Per patient report lives in \"senior efficiency\" apartment independently. Ambulates with rollator. Does not drive. Completes all ADLs independently. Stands in walk in shower. Cooks.  No history of falls. Home Situation  Home Environment: Apartment  # Steps to Enter: 0  One/Two Story Residence: One story  Living Alone: Yes  Support Systems: Other (comments) (senior building)  Patient Expects to be Discharged to[de-identified] Apartment  Current DME Used/Available at Home: [employer], rolling  []  Right hand dominant   []  Left hand dominant    EXAMINATION OF PERFORMANCE DEFICITS:  Cognitive/Behavioral Status:  Neurologic State: Alert  Orientation Level: Oriented X4  Cognition: Appropriate for age attention/concentration; Follows commands  Perception: Appears intact  Perseveration: No perseveration noted  Safety/Judgement: Fall prevention; Awareness of environment; Insight into deficits    Skin: Appears intact    Edema: None noted in BUEs     Hearing: Auditory  Auditory Impairment: None    Vision/Perceptual:    Tracking: Able to track stimulus in all quadrants w/o difficulty                      Acuity: Within Defined Limits         Range of Motion:  In BUEs  AROM: Within functional limits  PROM: Within functional limits                      Strength: In BUEs  Strength: Generally decreased, functional                Coordination:  Coordination: Within functional limits  Fine Motor Skills-Upper: Left Intact; Right Intact    Gross Motor Skills-Upper: Left Intact; Right Intact    Tone & Sensation:  In BUEs  Tone: Normal  Sensation: Intact                      Balance:  Sitting: Intact  Standing: Impaired  Standing - Static: Good;Constant support  Standing - Dynamic : Fair    Functional Mobility and Transfers for ADLs:  Bed Mobility:  Sit to Supine: Contact guard assistance    Transfers:  Sit to Stand: Contact guard assistance  Stand to Sit: Contact guard assistance  Toilet Transfer : Contact guard assistance; Adaptive equipment (BSC and RW)    ADL Assessment:  Feeding: Independent    Oral Facial Hygiene/Grooming: Independent*    Bathing:  Moderate assistance*    Upper Body Dressing: Independent*    Lower Body Dressing: Maximum assistance    Toileting: Maximum assistance   * Inferred per obs of BUE ROM, functional mobility, activity tolerance, and functional reach    ADL Intervention and task modifications:    Lower Body Dressing Assistance  Underpants: Maximum assistance  Pants With Button/Zipper: Maximum assistance  Leg Crossed Method Used: No  Position Performed: Seated in chair    Toileting  Bladder Hygiene: Minimum assistance  Clothing Management: Maximum assistance    Cognitive Retraining  Safety/Judgement: Fall prevention; Awareness of environment; Insight into deficits    Functional Measure:  Barthel Index:    Bathin  Bladder: 10  Bowels: 10  Groomin  Dressin  Feeding: 10  Mobility: 10  Stairs: 0  Toilet Use: 5  Transfer (Bed to Chair and Back): 10  Total: 65       Barthel and G-code impairment scale:  Percentage of impairment CH  0% CI  1-19% CJ  20-39% CK  40-59% CL  60-79% CM  80-99% CN  100%   Barthel Score 0-100 100 99-80 79-60 59-40 20-39 1-19   0   Barthel Score 0-20 20 17-19 13-16 9-12 5-8 1-4 0      The Barthel ADL Index: Guidelines  1. The index should be used as a record of what a patient does, not as a record of what a patient could do. 2. The main aim is to establish degree of independence from any help, physical or verbal, however minor and for whatever reason. 3. The need for supervision renders the patient not independent. 4. A patient's performance should be established using the best available evidence. Asking the patient, friends/relatives and nurses are the usual sources, but direct observation and common sense are also important. However direct testing is not needed. 5. Usually the patient's performance over the preceding 24-48 hours is important, but occasionally longer periods will be relevant. 6. Middle categories imply that the patient supplies over 50 per cent of the effort. 7. Use of aids to be independent is allowed. Sulma Trinh., Barthel, D.W. (1253).  Functional evaluation: the Barthel Index. [address] [facility] (14)2. Baker Settles jadiel [place], J.J.M.F, Bayron Edwards., Sonja Bautista., Carmencita Grant, [address] (1999). Measuring the change indisability after inpatient rehabilitation; comparison of the responsiveness of the Barthel Index and Functional Tift Measure. Journal of Neurology, Neurosurgery, and Psychiatry, 66(4), 706-470. [place] GASPER Leblanc, YORDAN Morales, & Shane Foster M.A. (2004.) Assessment of post-stroke quality of life in cost-effectiveness studies: The usefulness of the Barthel Index and the EuroQoL-5D. Quality of Life Research, 13, 184-79         G codes: In compliance with CMSs Claims Based Outcome Reporting, the following G-code set was chosen for this patient based on their primary functional limitation being treated: The outcome measure chosen to determine the severity of the functional limitation was the Barthel Index with a score of 65/100 which was correlated with the impairment scale. ? Self Care:     - CURRENT STATUS: CJ - 20%-39% impaired, limited or restricted    - GOAL STATUS: CI - 1%-19% impaired, limited or restricted    - D/C STATUS:  ---------------To be determined---------------     Occupational Therapy Evaluation Charge Determination   History Examination Decision-Making   LOW Complexity : Brief history review  LOW Complexity : 1-3 performance deficits relating to physical, cognitive , or psychosocial skils that result in activity limitations and / or participation restrictions  LOW Complexity : No comorbidities that affect functional and no verbal or physical assistance needed to complete eval tasks       Based on the above components, the patient evaluation is determined to be of the following complexity level: LOW   Pain:  Pain Scale 1: Numeric (0 - 10)  Pain Intensity 1: 0              Activity Tolerance:   Good. VSS. O2 saturation 90-91% on RA. Please refer to the flowsheet for vital signs taken during this treatment.   After treatment:   [] Patient left in no apparent distress sitting up in chair  [x] Patient left in no apparent distress in bed  [x] Call bell left within reach  [x] Nursing notified  [x] Caregiver present  [] Bed alarm activated    COMMUNICATION/EDUCATION:   The patients plan of care was discussed with: Physical Therapist and Registered Nurse. [x] Home safety education was provided and the patient/caregiver indicated understanding. [x] Patient/family have participated as able in goal setting and plan of care. [] Patient/family agree to work toward stated goals and plan of care. [] Patient understands intent and goals of therapy, but is neutral about his/her participation. [] Patient is unable to participate in goal setting and plan of care. This patients plan of care is appropriate for delegation to [facility].     Thank you for this referral.  Yuri Taylor OT  Time Calculation: 19 mins

## 2018-01-05 NOTE — PROGRESS NOTES
UDPATE:  Spoke with Reymundo Lake at [facility] and she asked that a note be put in [email or web address] when patient is discharged. UPDATE:  Received HH consult. [facility] patients use [facility], so referral sent to [facility] via [employer]. Called office at [phone] and left VM for on- giving them information on patient and my number as I will be here until 6:30 pm tonight. If patient is discharged Saturday or Sunday, RN or CM needs to call [facility] at [phone] and leave a VM for the on- letting them know patient is discharged. Patient in from [facility]. This IS NOT an Assisted Living. These apartments are for independent living with things like pulls and grab bars in places like the restroom, but no care is provided. CM will follow patient for discharge needs.

## 2018-01-05 NOTE — PROGRESS NOTES
Dr. Jennelle Bosworth- will admit patient,she will need discharge planning with care management. Unable to see patient at this time due to sleeping. Mariel Cline RN CRM    Spoke with patient and explained that patient is being admitted under Observation. Patient states that physician told her that. I gave her [address] [facility] Observation letters to sign and had them scanned into patients chart.

## 2018-01-05 NOTE — PROGRESS NOTES
Admission Medication Reconciliation:    Information obtained from:  Facility records provided by the patient (dated 12/28/2017)    Comments/Recommendations:  Ms. Andrew Klein reports taking her home medications as usual yesterday prior to her arrival.  She confirmed her Lantus dose with me. Limited interview due to patient receiving a neb treatment at the time of my visit. Added:  Imdur, Januvia, Lantus, Metolazone, Spiriva, Spironolactone, Tramadol, Claritin, Iron, Magnesium, Potassium  Removed:  Calcitriol, Mucinex, Lortab, Losartan  Changed:  Bumex, Coreg, Vitamin D    Prior to Admission Medications:   Prior to Admission Medications   Prescriptions Last Dose Informant Patient Reported? Taking? SITagliptin (JANUVIA) 100 mg tablet   Yes Yes   Sig: Take 100 mg by mouth daily. acetaminophen (TYLENOL) 500 mg tablet   Yes Yes   Sig: Take 500 mg by mouth every four (4) hours. Indications: BACK PAIN   albuterol (PROVENTIL HFA, VENTOLIN HFA) 90 mcg/actuation inhaler   Yes Yes   Sig: Take 2 Puffs by inhalation every four (4) hours as needed for Wheezing or Shortness of Breath. albuterol (PROVENTIL VENTOLIN) 2.5 mg /3 mL (0.083 %) nebulizer solution   No Yes   Sig: 3 mL by Nebulization route every four (4) hours as needed for Wheezing or Shortness of Breath. aspirin delayed-release 81 mg tablet   Yes Yes   Sig: Take 81 mg by mouth daily. atorvastatin (LIPITOR) 80 mg tablet   Yes Yes   Sig: Take 80 mg by mouth daily. benzonatate (TESSALON) 100 mg capsule   No Yes   Sig: Take 1 Cap by mouth three (3) times daily as needed for Cough. budesonide-formoterol (SYMBICORT) 160-4.5 mcg/actuation HFA inhaler   Yes Yes   Sig: Take 2 Puffs by inhalation two (2) times a day. bumetanide (BUMEX) 2 mg tablet   Yes Yes   Sig: Take 2 mg by mouth two (2) times a day. carvedilol (COREG) 6.25 mg tablet   Yes Yes   Sig: Take 6.25 mg by mouth two (2) times daily (with meals).    cholecalciferol (VITAMIN D3) 1,000 unit tablet   Yes Yes   Sig: Take 1,000 Units by mouth daily. clopidogrel (PLAVIX) 75 mg tablet   Yes Yes   Sig: Take 75 mg by mouth daily. ferrous sulfate 325 mg (65 mg iron) tablet   Yes Yes   Sig: Take 325 mg by mouth two (2) times daily (with meals). fluticasone (FLONASE) 50 mcg/actuation nasal spray   Yes Yes   Si Sprays by Both Nostrils route daily. gabapentin (NEURONTIN) 300 mg capsule   Yes Yes   Sig: Take 300 mg by mouth three (3) times daily. insulin glargine (LANTUS) 100 unit/mL injection   Yes Yes   Si Units by SubCUTAneous route nightly. ipratropium (ATROVENT) 0.02 % nebulizer solution   No Yes   Si.5 mL by Nebulization route every six (6) hours as needed for Wheezing. isosorbide mononitrate ER (IMDUR) 30 mg tablet   Yes Yes   Sig: Take 30 mg by mouth daily. loratadine (CLARITIN) 10 mg tablet   Yes Yes   Sig: Take 10 mg by mouth daily as needed for Allergies or Rhinitis.   magnesium oxide-mg aa chelate (MAGNESIUM) 300 mg cap   Yes Yes   Sig: Take 300 mg by mouth daily. metOLazone (ZAROXOLYN) 2.5 mg tablet   Yes Yes   Sig: Take 2.5 mg by mouth daily. montelukast (SINGULAIR) 10 mg tablet   Yes Yes   Sig: Take 10 mg by mouth daily. nitroglycerin (NITROSTAT) 0.4 mg SL tablet   Yes Yes   Sig: by SubLINGual route every five (5) minutes as needed for Chest Pain. omeprazole (PRILOSEC) 20 mg capsule   Yes Yes   Sig: Take 20 mg by mouth Before breakfast and dinner. potassium chloride SR (KLOR-CON 10) 10 mEq tablet   Yes Yes   Sig: Take 20 mEq by mouth daily. sertraline (ZOLOFT) 100 mg tablet   Yes Yes   Sig: Take 100 mg by mouth daily. spironolactone (ALDACTONE) 25 mg tablet   Yes Yes   Sig: Take 25 mg by mouth daily. tiotropium (SPIRIVA WITH HANDIHALER) 18 mcg inhalation capsule   Yes Yes   Sig: Take 1 Cap by inhalation daily. traMADol (ULTRAM) 50 mg tablet   Yes Yes   Sig: Take 50 mg by mouth three (3) times daily as needed (back pain).       Facility-Administered Medications: None

## 2018-01-05 NOTE — PROGRESS NOTES
TRANSFER - IN REPORT:    Verbal report received from [facility] AND [facility] (name) on Iggy Ruiz  being received from ED (unit) for routine progression of care      Report consisted of patients Situation, Background, Assessment and Recommendations(SBAR). Information from the following report(s) SBAR, Kardex, ED Summary, Intake/Output, MAR, Accordion, Recent Results and Cardiac Rhythm SR was reviewed with the receiving nurse. Opportunity for questions and clarification was provided. Assessment completed upon patients arrival to unit and care assumed. Primary Nurse Donnell Cedeño RN and JURGEN Rowland performed a dual skin assessment on this patient No impairment noted.  Bryan score is as charted

## 2018-01-05 NOTE — ED TRIAGE NOTES
Pt arrived via EMS from [facility] [place]. Pt c/o generalized weakness. Pt report fall earlier today due to weakness. Denies any trauma. Denies LOC.

## 2018-01-05 NOTE — H&P
[address] [facility]  ACUTE CARE HISTORY AND PHYSICAL    Cat Abed  MR#: [id number]  : 1950  ACCOUNT #: [de-identified]   DATE OF SERVICE: 2018    PRIMARY CARE PHYSICIAN:  Dr. August Torres. Cards - Dr. Lesley Fernandez:  Feeling weak and fatigued. HISTORY OF PRESENT ILLNESS:  The patient is a 21-year-old female who presents with fatigue and weakness for the last few days. The patient's diuretic dose was recently increased by the primary care office to Bumex 2 mg 2 tabs b.i.d. She has underlying history of asthma, COPD, coronary artery disease, chronic diastolic congestive heart failure, hypertension and type 2 diabetes mellitus. She has been increasingly diuresed for the last 1-2 weeks on diuretics due to fluid overload. She denies any chest pain, palpitation or lower extremity swelling. She has had increased shortness of breath, which led to increasing diuretic dose. EMS was called to assisted living to help the patient, since she fell on the ground at the assisted living facility. The patient did not lose consciousness or hit her head. The patient denies any headache or focal weakness of the extremity. The patient, around 8 p.m., went to the bathroom to change into her night clothes, and fell in the bathroom due to generalized weakness. Extensive workup was done in the [facility] ER, with significant finding for low potassium at 2.4, and low sodium at 124 with low chloride of 70. At this point, hypokalemia and hyponatremia, she is admitted and Hospitalist was called for further evaluation and admission. REVIEW OF SYSTEMS:  GENERAL:  Denies any fever, denies any loss of appetite. HEAD, EYES, EARS, NOSE AND THROAT:  Denies any sore throat or nasal congestion. RESPIRATORY:  Denies cough, has positive shortness of breath chronically. Denies wheezing. CARDIOVASCULAR:  Denies chest pain, palpitations or lower extremity edema.     GASTROINTESTINAL: Denies nausea, vomiting and abdominal pain. GENITOURINARY:  Denies urinary frequency, urgency or nocturia. ENDOCRINE:  Denies polyuria, polydipsia or polyphagia. SKIN:  Denies any rash. PSYCHIATRY:  Denies any headache. NEUROLOGIC:  Recent ground level fall as per HPI, and generalized weakness, but denies any focal weakness of the extremity. Denies any change in speech. PAST MEDICAL HISTORY INCLUDES:  1.  Major depressive disorder, recurrent. 2.  Type 2 diabetes mellitus. 3.  Morbid obesity. 4.  Emphysema, with COPD disease. 5.  Chronic combined systolic and diastolic congestive heart failure, with an LVEF of 37% on a nuclear medicine cardiac scan, with an EF of 37%. Also, previous echocardiogram in 03/2016 with moderate hypokinesis, grade I diastolic dysfunction. 6.  Atherosclerosis of the native coronary arteries. 7.  Ventral hernia, without obstruction. 8.  Anemia of chronic kidney disease. 9.  History of falls. 10.  Sciatica. 11.  History of chronic allergic conjunctivitis. 12.  History of CVA. 13.  CKD, stage III. 14.  GERD. 15.  Tobacco use disorder. 16.  Hyperlipidemia. 17.  Type vitamin D deficiency. 18.  Degenerative joint disease. PAST SURGICAL HISTORY:  Includes cholecystectomy. ALLERGIES:  INCLUDE METFORMIN, PENICILLIN AND GARLIC. HOME MEDICATIONS:  1. Tylenol 100 mg p.o. q.i.d. p.r.n.  2.  Januvia 100 mg p.o. every day. 3.  Lantus, use as directed. 4.  Spiriva 18 mcg as directed. 5.  Symbicort 160/4.5 two puffs b.i.d.  6.  Ventolin HFA 2 puffs q.6 h. p.r.n.  7.  Vitamin D 3000 unit p.o. every day. 8.  Albuterol nebulized q.6 h. p.r.n.  9.  Aspirin 81 mg daily. 10.  Atorvastatin 80 mg daily. 11.  Bumex 2 mg 2 tabs twice daily, that is Bumex 4 mg twice daily. 12.  Coreg 6.25 mg p.o. twice a day. 13.  Plavix 75 mg p.o. every day. 14.  Ferrous sulfate 325 mg p.o. twice a day. 15.  Flonase 2 sprays per nostril every day.   16.  Neurontin 300 mg p.o. 3 times a day.  17.  Ipratropium nebulized q.6 h.  p.r.n.  18.  Imdur 30 mg twice daily. 19.  Loratadine 10 mg p.o. every day p.r.n. 20.  Magnesium 300 mg p.o. every day,  21. Metolazone 2.5 mg p.o. every day. 22.  Montelukast 10 mg daily. 23.  Sublingual nitroglycerin 0.4 mg p.r.n. chest pain. 24.  Omeprazole 20 mg daily. 25.  KCl 20 mEq p.o. every day,  26. Prednisone taper that was given on 12/28/2017.  27.  Sertraline 100 mg daily. 28.  Aldactone 25 mg p.o. every day,  29. Tramadol 50 mg p.o. t.i.d. p.r.n. FAMILY HISTORY:  Not contributory. SOCIAL HISTORY:  The patient is a current every day smoker, smokes 1 pack per day. She denies any alcohol or illicit drug use. She is legally , and living in an senior apartment home facility. PHYSICAL EXAMINATION:  VITAL SIGNS:  Include temperature of 98.9, respiratory rate of 17, pulse rate of 85, blood pressure 155/49, pulse oximetry of 90% on room air. GENERAL:  The patient is awake, alert, oriented to self and place, not in any acute respiratory or painful distress. HEAD, EYES, EARS, NOSE AND THROAT:  Normocephalic, atraumatic. Pupils are equal, round, reactive to light and accommodation. Extraocular muscles are intact. No jugular vein distention. No carotid bruit. NECK:  Trachea is midline. No thyromegaly. CARDIOVASCULAR:  Regular rate and rhythm. Heart sounds S1, S2. No murmur, rub or gallop. CHEST:  Clear to auscultation bilaterally. No crackles, rhonchi or wheezing; however, has diminished breath sounds bilaterally. ABDOMEN:  Full, soft, nontender. Positive bowel sounds. No focal guarding, rebound or tenderness. MUSCULOSKELETAL:  She has normal range of motion. EXTREMITIES:  No lower extremity edema. NEUROLOGIC:  The patient is alert and oriented to self and place. Cranial nerves II-XII are intact. Strength of 5/5 in all 4 extremities. Deep tendon reflexes 2+ globally. SKIN:  Warm and dry.   PSYCHIATRIC:  Normal mood and affect. Normal insight and judgment. DIAGNOSTIC DATA:  EKG, with normal sinus rhythm, prolonged QRS interval, no acute ST or T-wave changes. Chest x-ray portable shows no radiographic evidence of acute cardiopulmonary disease. CT of the head without contrast shows encephalomalacia in the right coronary radiata/external capsule, no evidence of acute intracranial abnormality by this modality. CT of the lumbar spine shows no acute finding, lumbosacral degenerative disk disease. LABORATORY DATA:  Include on 01/04/2018, CBC with differential with a WBC of 8.5, hemoglobin of 12.4, hematocrit of 37.7, platelet of 010, with 76% neutrophils, 15% lymphocytes. Urinalysis with a specific gravity of 1.009, negative for leukocyte esterase and nitrite, 0-4 WBCs. CMP with a sodium of 122, potassium of 2.2, chloride of 70, bicarbonate of 43, BUN of 43, creatinine 1.4, glucose 251, calcium of 9.7, magnesium of 1.9, EGFR of 45, total bilirubin 0.7, total protein of 8.5, albumin of 4.0, ALT 35, AST 25, alkaline phosphatase 131. Troponin I of less than 0.04. BNP of 299. Labs on 01/05/2018, with a BMP with sodium of 127, potassium of 2.9, chloride of 80, bicarbonate of 41, BUN of 40, creatinine of 1.31, calcium 8.6, magnesium of 1.8, EGFR of 49, glucose 251. ASSESSMENT AND PLAN:  A 59-year-old female with:  1. Generalized weakness due to hypokalemia and acute on chronic hyponatremia, with previous sodium level of 127 in 09/2017 due to diuretic. Will hydrate with IV fluid gently, p.o. sodium chloride, p.o. potassium replacement, p.o. magnesium replacement, with a potassium level goal of greater than 3.5, magnesium of greater than 2. Keep also on lower dose of diuretic to prevent pulmonary edema. 2.  History of chronic biventricular congestive heart failure, without any acute exacerbation.   Keep on aspirin, lower dose of Bumex, Plavix, Coreg, Imdur, p.o. potassium replacement, p.o. magnesium replacement, hold off on metolazone and Aldactone. 3.  History of major depressive disorder. Continue sertraline. 4.  History of chronic obstructive pulmonary disease. Keep on p.r.n. bronchodilator. 5.  History of iron-deficiency anemia. Continue ferrous sulfate. 6.  Ground level fall. CT of the lumbar spine shows degenerative joint disease. Continue with Neurontin t.i.d.  7.  Dyslipidemia, hyperlipidemia. Continue with atorvastatin 80 mg daily. 8.  History of type 2 diabetes mellitus, on sliding scale insulin, while holding the Januvia, keep on home dose of Lantus. 9.  History of gastroesophageal reflux disease. Keep on proton pump inhibitor. 10.  Weakness and deconditioning. Physical Therapy, Occupational Therapy and Case Management are consulted to help with discharge planning needs. Prior to arrival, patient resides at assisted living facility.       MD NELDA Tsang / ZAID  D: 01/05/2018 08:58     T: 01/05/2018 09:56  JOB #: [id number]

## 2018-01-05 NOTE — PROGRESS NOTES
TRANSFER - OUT REPORT:    Verbal report given to Leola Brand RN (name) on Jeramy Bee  being transferred to [facility]) for routine progression of care       Report consisted of patients Situation, Background, Assessment and   Recommendations(SBAR). Information from the following report(s) SBAR was reviewed with the receiving nurse. Lines:   Peripheral IV 01/04/18 Left Antecubital (Active)   Site Assessment Clean, dry, & intact 1/4/2018  9:58 PM   Phlebitis Assessment 0 1/4/2018  9:58 PM   Infiltration Assessment 0 1/4/2018  9:58 PM   Dressing Status Clean, dry, & intact 1/4/2018  9:58 PM   Dressing Type Transparent 1/4/2018  9:58 PM        Opportunity for questions and clarification was provided.       Patient transported with:   [employer]

## 2018-01-06 VITALS
OXYGEN SATURATION: 92 % | DIASTOLIC BLOOD PRESSURE: 50 MMHG | SYSTOLIC BLOOD PRESSURE: 124 MMHG | RESPIRATION RATE: 20 BRPM | WEIGHT: 215 LBS | HEIGHT: 60 IN | HEART RATE: 72 BPM | BODY MASS INDEX: 42.21 KG/M2 | TEMPERATURE: 98.4 F

## 2018-01-06 LAB
ANION GAP SERPL CALC-SCNC: 6 MMOL/L (ref 5–15)
ANION GAP SERPL CALC-SCNC: 9 MMOL/L (ref 5–15)
BUN SERPL-MCNC: 28 MG/DL (ref 6–20)
BUN SERPL-MCNC: 33 MG/DL (ref 6–20)
BUN/CREAT SERPL: 22 (ref 12–20)
BUN/CREAT SERPL: 27 (ref 12–20)
CALCIUM SERPL-MCNC: 8.7 MG/DL (ref 8.5–10.1)
CALCIUM SERPL-MCNC: 9.3 MG/DL (ref 8.5–10.1)
CHLORIDE SERPL-SCNC: 84 MMOL/L (ref 97–108)
CHLORIDE SERPL-SCNC: 86 MMOL/L (ref 97–108)
CO2 SERPL-SCNC: 39 MMOL/L (ref 21–32)
CO2 SERPL-SCNC: 39 MMOL/L (ref 21–32)
CREAT SERPL-MCNC: 1.21 MG/DL (ref 0.55–1.02)
CREAT SERPL-MCNC: 1.25 MG/DL (ref 0.55–1.02)
GLUCOSE BLD STRIP.AUTO-MCNC: 219 MG/DL (ref 65–100)
GLUCOSE BLD STRIP.AUTO-MCNC: 345 MG/DL (ref 65–100)
GLUCOSE SERPL-MCNC: 224 MG/DL (ref 65–100)
GLUCOSE SERPL-MCNC: 274 MG/DL (ref 65–100)
MAGNESIUM SERPL-MCNC: 1.6 MG/DL (ref 1.6–2.4)
MAGNESIUM SERPL-MCNC: 2 MG/DL (ref 1.6–2.4)
POTASSIUM SERPL-SCNC: 2.7 MMOL/L (ref 3.5–5.1)
POTASSIUM SERPL-SCNC: 3.8 MMOL/L (ref 3.5–5.1)
SERVICE CMNT-IMP: ABNORMAL
SERVICE CMNT-IMP: ABNORMAL
SODIUM SERPL-SCNC: 131 MMOL/L (ref 136–145)
SODIUM SERPL-SCNC: 132 MMOL/L (ref 136–145)

## 2018-01-06 PROCEDURE — 96367 TX/PROPH/DG ADDL SEQ IV INF: CPT

## 2018-01-06 PROCEDURE — 74011250636 HC RX REV CODE- 250/636: Performed by: INTERNAL MEDICINE

## 2018-01-06 PROCEDURE — 83735 ASSAY OF MAGNESIUM: CPT | Performed by: INTERNAL MEDICINE

## 2018-01-06 PROCEDURE — 74011000250 HC RX REV CODE- 250: Performed by: INTERNAL MEDICINE

## 2018-01-06 PROCEDURE — 94640 AIRWAY INHALATION TREATMENT: CPT

## 2018-01-06 PROCEDURE — 80048 BASIC METABOLIC PNL TOTAL CA: CPT | Performed by: INTERNAL MEDICINE

## 2018-01-06 PROCEDURE — 36415 COLL VENOUS BLD VENIPUNCTURE: CPT | Performed by: INTERNAL MEDICINE

## 2018-01-06 PROCEDURE — 74011250637 HC RX REV CODE- 250/637: Performed by: INTERNAL MEDICINE

## 2018-01-06 PROCEDURE — 99218 HC RM OBSERVATION: CPT

## 2018-01-06 PROCEDURE — 74011636637 HC RX REV CODE- 636/637: Performed by: INTERNAL MEDICINE

## 2018-01-06 PROCEDURE — 82962 GLUCOSE BLOOD TEST: CPT

## 2018-01-06 RX ORDER — POTASSIUM CHLORIDE 1500 MG/1
60 TABLET, FILM COATED, EXTENDED RELEASE ORAL 2 TIMES DAILY
Qty: 180 TAB | Refills: 0 | Status: ON HOLD | OUTPATIENT
Start: 2018-01-06 | End: 2019-11-27 | Stop reason: SDUPTHER

## 2018-01-06 RX ORDER — POTASSIUM CHLORIDE 750 MG/1
40 TABLET, FILM COATED, EXTENDED RELEASE ORAL
Status: COMPLETED | OUTPATIENT
Start: 2018-01-06 | End: 2018-01-06

## 2018-01-06 RX ORDER — LANOLIN ALCOHOL/MO/W.PET/CERES
800 CREAM (GRAM) TOPICAL 2 TIMES DAILY
Qty: 120 TAB | Refills: 0 | Status: SHIPPED | OUTPATIENT
Start: 2018-01-06

## 2018-01-06 RX ORDER — GLIPIZIDE 5 MG/1
5 TABLET ORAL
Qty: 30 TAB | Refills: 2 | Status: SHIPPED | OUTPATIENT
Start: 2018-01-06 | End: 2019-11-24

## 2018-01-06 RX ORDER — BUMETANIDE 2 MG/1
2 TABLET ORAL 2 TIMES DAILY
Qty: 2 TAB | Refills: 0 | Status: ON HOLD | OUTPATIENT
Start: 2018-01-06 | End: 2019-11-27 | Stop reason: SDUPTHER

## 2018-01-06 RX ORDER — MAGNESIUM SULFATE 1 G/100ML
1 INJECTION INTRAVENOUS ONCE
Status: COMPLETED | OUTPATIENT
Start: 2018-01-06 | End: 2018-01-06

## 2018-01-06 RX ADMIN — MONTELUKAST SODIUM 10 MG: 10 TABLET, FILM COATED ORAL at 08:35

## 2018-01-06 RX ADMIN — PANTOPRAZOLE SODIUM 40 MG: 40 TABLET, DELAYED RELEASE ORAL at 07:21

## 2018-01-06 RX ADMIN — MAGNESIUM SULFATE HEPTAHYDRATE 1 G: 1 INJECTION, SOLUTION INTRAVENOUS at 07:20

## 2018-01-06 RX ADMIN — FLUTICASONE PROPIONATE 2 SPRAY: 50 SPRAY, METERED NASAL at 10:38

## 2018-01-06 RX ADMIN — INSULIN LISPRO 7 UNITS: 100 INJECTION, SOLUTION INTRAVENOUS; SUBCUTANEOUS at 11:50

## 2018-01-06 RX ADMIN — ATORVASTATIN CALCIUM 80 MG: 40 TABLET, FILM COATED ORAL at 08:34

## 2018-01-06 RX ADMIN — POTASSIUM CHLORIDE 40 MEQ: 750 TABLET, FILM COATED, EXTENDED RELEASE ORAL at 10:36

## 2018-01-06 RX ADMIN — CLOPIDOGREL BISULFATE 75 MG: 75 TABLET ORAL at 08:34

## 2018-01-06 RX ADMIN — SERTRALINE HYDROCHLORIDE 100 MG: 50 TABLET ORAL at 08:36

## 2018-01-06 RX ADMIN — POTASSIUM CHLORIDE 40 MEQ: 750 TABLET, FILM COATED, EXTENDED RELEASE ORAL at 07:21

## 2018-01-06 RX ADMIN — SODIUM CHLORIDE TAB 1 GM 1 G: 1 TAB at 08:37

## 2018-01-06 RX ADMIN — FERROUS SULFATE TAB 325 MG (65 MG ELEMENTAL FE) 325 MG: 325 (65 FE) TAB at 08:37

## 2018-01-06 RX ADMIN — TRAMADOL HYDROCHLORIDE 50 MG: 50 TABLET, FILM COATED ORAL at 08:41

## 2018-01-06 RX ADMIN — POTASSIUM CHLORIDE 40 MEQ: 750 TABLET, FILM COATED, EXTENDED RELEASE ORAL at 08:35

## 2018-01-06 RX ADMIN — BUMETANIDE 1 MG: 1 TABLET ORAL at 08:37

## 2018-01-06 RX ADMIN — INSULIN LISPRO 3 UNITS: 100 INJECTION, SOLUTION INTRAVENOUS; SUBCUTANEOUS at 07:21

## 2018-01-06 RX ADMIN — ARFORMOTEROL TARTRATE 15 MCG: 15 SOLUTION RESPIRATORY (INHALATION) at 08:10

## 2018-01-06 RX ADMIN — BUDESONIDE 500 MCG: 0.5 INHALANT RESPIRATORY (INHALATION) at 08:10

## 2018-01-06 RX ADMIN — SODIUM CHLORIDE TAB 1 GM 1 G: 1 TAB at 11:49

## 2018-01-06 RX ADMIN — DOCUSATE SODIUM 100 MG: 100 CAPSULE, LIQUID FILLED ORAL at 08:34

## 2018-01-06 RX ADMIN — Medication 800 MG: at 08:34

## 2018-01-06 RX ADMIN — CARVEDILOL 6.25 MG: 6.25 TABLET, FILM COATED ORAL at 08:36

## 2018-01-06 RX ADMIN — ISOSORBIDE MONONITRATE 30 MG: 30 TABLET, EXTENDED RELEASE ORAL at 08:34

## 2018-01-06 RX ADMIN — ASPIRIN 81 MG: 81 TABLET, COATED ORAL at 08:36

## 2018-01-06 NOTE — DISCHARGE SUMMARY
[facility]   [address] [place] [address], [address] [facility]  Krissy Gracia 2    Discharge Summary     PATIENT ID: Nehemiah Dickey  MRN: [id number]   YOB: 1950    DATE OF ADMISSION: 1/4/2018  9:42 PM    DATE OF DISCHARGE: 1/6/2018  PRIMARY CARE PROVIDER: Zebedee Precise, DO       DISCHARGING PHYSICIAN: MD Basil    To contact this individual call [phone]. CONSULTATIONS: IP CONSULT TO PRIMARY CARE PROVIDER    PROCEDURES/SURGERIES: * No surgery found *    ADMITTING [address] [place] [address] COURSE:   76-year-old female with:  1. Generalized weakness due to hypokalemia and acute on chronic hyponatremia, with previous sodium level of 127 in 09/2017 due to diuretic. Will hydrate with IV fluid gently, p.o. sodium chloride, p.o. potassium replacement, p.o. magnesium replacement, with a potassium level goal of greater than 3.5, magnesium of greater than 2. Keep also on lower dose of diuretic to prevent pulmonary edema. 2.  History of chronic biventricular congestive heart failure, without any acute exacerbation. Keep on aspirin, lower dose of Bumex, Plavix, Coreg, Imdur, p.o. potassium replacement, p.o. magnesium replacement, hold off on metolazone and Aldactone. 3.  History of major depressive disorder. Continue sertraline. 4.  History of chronic obstructive pulmonary disease. Keep on p.r.n. bronchodilator. 5.  History of iron-deficiency anemia. Continue ferrous sulfate. 6.  Ground level fall. CT of the lumbar spine shows degenerative joint disease. Continue with Neurontin t.i.d.  7.  Dyslipidemia, hyperlipidemia. Continue with atorvastatin 80 mg daily. 8.  History of type 2 diabetes mellitus, on sliding scale insulin, while holding the Januvia, keep on home dose of Lantus. 9.  History of gastroesophageal reflux disease. Keep on proton pump inhibitor. 10.  Weakness and deconditioning. Physical Therapy, Occupational Therapy and Case Management are consulted to help with discharge planning needs. Prior to arrival, patient resides at [facility]. DISCHARGE DIAGNOSES / PLAN:      78-year-old female with:  1.  Generalized weakness due to hypokalemia and acute on chronic hyponatremia, Contraction alkalosis chronically, with previous sodium level of 127 in 09/2017 due to diuretic.  s/p IV fluid 2L gently, p.o. sodium chloride, needs to remain on p.o. Kdur 40meq po bid with po bumex dosing, p.o. Magnesium ox 800mg po bid, with a potassium level goal of greater than 3.5, magnesium of greater than 2.  Keep also on lower dose of bumex 1mg po bid, BMP / Mg check q week next due on 1/9/18 to decide dose of electrolyte replacement. 2.  History of chronic biventricular congestive heart failure, without any acute exacerbation.  Keep on aspirin, keep on Bumex 2mg po bid, Plavix, Coreg, Imdur, p.o. potassium replacement, p.o. magnesium replacement Aldactone. Hold off on metolazone at [place]. Consider Diamox for diuresis (alternative to bumex) to prevent contraction alkalosis, elev Hco3 on BMP.     3.  History of major depressive disorder.  Continue sertraline. 4.  History of chronic obstructive pulmonary disease.  Keep on p.r.n. bronchodilator. 5.  History of iron-deficiency anemia.  Continue ferrous sulfate. 6.  Ground level fall.  CT of the lumbar spine shows degenerative joint disease.  Continue with Neurontin t.i.d.  7.  Dyslipidemia, hyperlipidemia.  Continue with atorvastatin 80 mg daily. 8.  History of type 2 diabetes mellitus, on sliding scale insulin - resume Januvia at [place], add Glipizide 5mg po qdinner, keep on home dose of Lantus 30 U Subq daily, take in the am. Monitor FSBS trend & follow-up with PCP. Pt state her home BG PTA are 250- 190's. 9.  History of gastroesophageal reflux disease.  Keep on proton pump inhibitor.   10.  Weakness and deconditioning.  PT, OT recommend , and CM has set up [facility] with [facility].    11. Morbid obesity- recommend outpt sleep study with PAR.      VTE prophylaxis: lovenox subq  Discussed plan of care with Patient/Family and Nurse   Disposition:  discharge to home 1/6/18 with [facility]. Pt resides at [facility],  to help with [facility]. Aaron Palacios specific discharge instructions:   No unfinished business,   no new oxygen needs,   no new anti-coagulation needs. New Rx changes Rx Kdur 40meq po bid, Rx Mag ox 800mg po BID, Rx bumex 2mg po bid, Rx Glipizide 5mg po qdinner. Pls follow-up BMG / Mg / phos level in 1 week. Dc Metolazone        PENDING TEST RESULTS:   At the time of discharge the following test results are still pending: None    FOLLOW UP APPOINTMENTS:    Follow-up Information     Follow up With Details Comments Tenisha Mathur from [facility] will contact you regarding times to come to your home for therapies. [address], DO In 2 days BMP / Mg /Phos due q weely next due on 1/9/18, Consider Diamox )alternative to bumex for diuresis) if bicarb level high on BMP  [id number] Yuli Gilbert MD In 1 week CHF management [facility]  [phone]      [facility].  Sleep study Pls order referral [address] [place] [facility] [place] [address]           ADDITIONAL CARE RECOMMENDATIONS:   FSBS check qid before meals & at bedtime, carry record for PCP office. Daily weight, write trend, carry record to PCP office. DIET: Cardiac Diet and Diabetic Diet, Limit po fluid to 2L/day, avoid conc sweet    ACTIVITY: Activity as tolerated    WOUND CARE: None    EQUIPMENT needed: None    DISCHARGE MEDICATIONS:  Current Discharge Medication List      START taking these medications    Details   magnesium oxide (MAG-OX) 400 mg tablet Take 2 Tabs by mouth two (2) times a day.  Take with bumex  Qty: 120 Tab, Refills: 0      glipiZIDE (GLUCOTROL) 5 mg tablet Take 1 Tab by mouth daily (with dinner). Qty: 30 Tab, Refills: 2         CONTINUE these medications which have CHANGED    Details   bumetanide (BUMEX) 2 mg tablet Take 1 Tab by mouth two (2) times a day. Qty: 2 Tab, Refills: 0      potassium chloride SR (K-TAB) 20 mEq tablet Take 3 Tabs by mouth two (2) times a day. Take with bumex  Qty: 180 Tab, Refills: 0         CONTINUE these medications which have NOT CHANGED    Details   carvedilol (COREG) 6.25 mg tablet Take 6.25 mg by mouth two (2) times daily (with meals). traMADol (ULTRAM) 50 mg tablet Take 50 mg by mouth three (3) times daily as needed (back pain). nitroglycerin (NITROSTAT) 0.4 mg SL tablet by SubLINGual route every five (5) minutes as needed for Chest Pain.      tiotropium (SPIRIVA WITH HANDIHALER) 18 mcg inhalation capsule Take 1 Cap by inhalation daily. spironolactone (ALDACTONE) 25 mg tablet Take 25 mg by mouth daily. SITagliptin (JANUVIA) 100 mg tablet Take 100 mg by mouth daily. isosorbide mononitrate ER (IMDUR) 30 mg tablet Take 30 mg by mouth daily. ferrous sulfate 325 mg (65 mg iron) tablet Take 325 mg by mouth two (2) times daily (with meals). insulin glargine (LANTUS) 100 unit/mL injection 30 Units by SubCUTAneous route nightly. albuterol (PROVENTIL VENTOLIN) 2.5 mg /3 mL (0.083 %) nebulizer solution 3 mL by Nebulization route every four (4) hours as needed for Wheezing or Shortness of Breath. Qty: 24 Each, Refills: 3      benzonatate (TESSALON) 100 mg capsule Take 1 Cap by mouth three (3) times daily as needed for Cough. Qty: 30 Cap, Refills: 0      ipratropium (ATROVENT) 0.02 % nebulizer solution 2.5 mL by Nebulization route every six (6) hours as needed for Wheezing. Qty: 25 mL, Refills: 3      aspirin delayed-release 81 mg tablet Take 81 mg by mouth daily. atorvastatin (LIPITOR) 80 mg tablet Take 80 mg by mouth daily. budesonide-formoterol (SYMBICORT) 160-4.5 mcg/actuation HFA inhaler Take 2 Puffs by inhalation two (2) times a day. acetaminophen (TYLENOL) 500 mg tablet Take 500 mg by mouth every four (4) hours. Indications: BACK PAIN      omeprazole (PRILOSEC) 20 mg capsule Take 20 mg by mouth Before breakfast and dinner. gabapentin (NEURONTIN) 300 mg capsule Take 300 mg by mouth three (3) times daily. montelukast (SINGULAIR) 10 mg tablet Take 10 mg by mouth daily. cholecalciferol (VITAMIN D3) 1,000 unit tablet Take 1,000 Units by mouth daily. clopidogrel (PLAVIX) 75 mg tablet Take 75 mg by mouth daily. fluticasone (FLONASE) 50 mcg/actuation nasal spray 2 Sprays by Both Nostrils route daily. sertraline (ZOLOFT) 100 mg tablet Take 100 mg by mouth daily. loratadine (CLARITIN) 10 mg tablet Take 10 mg by mouth daily as needed for Allergies or Rhinitis. albuterol (PROVENTIL HFA, VENTOLIN HFA) 90 mcg/actuation inhaler Take 2 Puffs by inhalation every four (4) hours as needed for Wheezing or Shortness of Breath. STOP taking these medications       metOLazone (ZAROXOLYN) 2.5 mg tablet Comments:   Reason for Stopping:         magnesium oxide-mg aa chelate (MAGNESIUM) 300 mg cap Comments:   Reason for Stopping:                 NOTIFY YOUR PHYSICIAN FOR ANY OF THE FOLLOWING:   Fever over 101 degrees for 24 hours. Chest pain, shortness of breath, fever, chills, nausea, vomiting, diarrhea, change in mentation, falling, weakness, bleeding. Severe pain or pain not relieved by medications. Or, any other signs or symptoms that you may have questions about.     DISPOSITION:  x  Home With:  x OT x PT x HH  RN       Long term SNF/Inpatient Rehab    Independent/assisted living    Hospice    Other:       PATIENT CONDITION AT DISCHARGE:     Functional status    Poor     Deconditioned    x Independent      Cognition     Lucid     Forgetful     Dementia      Catheters/lines (plus indication) Prasad     PICC     PEG    x None      Code status   x  Full code     DNR      PHYSICAL EXAMINATION AT DISCHARGE:  Physical examination       Visit Vitals    /50 (BP 1 Location: Left arm, BP Patient Position: At rest;Supine)    Pulse 72    Temp 98.4 °F (36.9 °C)    Resp 20    Ht 5' (1.524 m)    Wt 96.3 kg (212 lb 4.9 oz)    SpO2 92%    BMI 41.46 kg/m2      Temp (24hrs), Av.2 °F (36.8 °C), Min:97.2 °F (36.2 °C), Max:98.6 °F (37 °C)      O2 Flow Rate (L/min): 1 l/min   O2 Device: Room air  Patient Vitals for the past 24 hrs:    Temp Pulse Resp BP SpO2   18 1129 98.4 °F (36.9 °C) 72 20 124/50 92 %   18 0810 - - - - 95 %   18 0740 98 °F (36.7 °C) 78 16 155/83 94 %   18 0433 98.4 °F (36.9 °C) 87 18 142/83 92 %   18 0316 - 70 - - -   18 2330 98.6 °F (37 °C) (!) 117 16 117/67 91 %   18 2044 - - - - 94 %   18 1929 98.4 °F (36.9 °C) 79 18 112/69 91 %   18 1721 - 78 - 108/69 -   18 1510 97.2 °F (36.2 °C) 81 20 100/67 97 %         Intake/Output Summary (Last 24 hours) at 18 1224  Last data filed at 18 0800    Gross per 24 hour   Intake              480 ml   Output              700 ml   Net             -220 ml      Last shift:    701 -  1900  In: 240 [P.O.:240]  Out: -   Last 3 shifts:    1901 -  0700  In: 240 [P.O.:240]  Out: 700 [Urine:700]     General:   Alert, cooperative, no acute distress   Head:   Atraumatic   Eyes:   Conjunctivae clear   ENT:  Oral mucosa normal   Neck:  Supple, trachea midline, no adenopathy   No JVD   Back:    No CVA tenderness    Chest wall:    No tenderness or deformities    Lungs:   Clear to auscultation bilaterally    Heart:   Regular rhythm, no murmur   Abdomen:    Soft, non-tender   No masses or organomegaly    Extremities:  No edema or DVT signs   Pulses:  Symmetric all extremities   Skin:  Warm and dry    No rashes or lesions   Neurologic:  Oriented x3, CN 2-12 intact   No focal deficits   Psychiatric:  normal insight; normal affect            Data Review:   Reviewed Lab & radiology  Telemetry NSR         Recent Labs       01/04/18   2203   WBC  8.5   HGB  12.4   HCT  37.7   PLT  281      Recent Results (from the past 12 hour(s))   METABOLIC PANEL, BASIC    Collection Time: 01/06/18  4:39 AM   Result Value Ref Range    Sodium 132 (L) 136 - 145 mmol/L    Potassium 2.7 (LL) 3.5 - 5.1 mmol/L    Chloride 84 (L) 97 - 108 mmol/L    CO2 39 (H) 21 - 32 mmol/L    Anion gap 9 5 - 15 mmol/L    Glucose 224 (H) 65 - 100 mg/dL    BUN 33 (H) 6 - 20 MG/DL    Creatinine 1.21 (H) 0.55 - 1.02 MG/DL    BUN/Creatinine ratio 27 (H) 12 - 20      GFR est AA 54 (L) >60 ml/min/1.73m2    GFR est non-AA 44 (L) >60 ml/min/1.73m2    Calcium 8.7 8.5 - 10.1 MG/DL   MAGNESIUM    Collection Time: 01/06/18  4:39 AM   Result Value Ref Range    Magnesium 1.6 1.6 - 2.4 mg/dL   GLUCOSE, POC    Collection Time: 01/06/18  6:56 AM   Result Value Ref Range    Glucose (POC) 219 (H) 65 - 100 mg/dL    Performed by Georges Ayala    GLUCOSE, POC    Collection Time: 01/06/18 11:32 AM   Result Value Ref Range    Glucose (POC) 345 (H) 65 - 100 mg/dL    Performed by Crystal LUNA    METABOLIC PANEL, BASIC    Collection Time: 01/06/18 12:41 PM   Result Value Ref Range    Sodium 131 (L) 136 - 145 mmol/L    Potassium 3.8 3.5 - 5.1 mmol/L    Chloride 86 (L) 97 - 108 mmol/L    CO2 39 (H) 21 - 32 mmol/L    Anion gap 6 5 - 15 mmol/L    Glucose 274 (H) 65 - 100 mg/dL    BUN 28 (H) 6 - 20 MG/DL    Creatinine 1.25 (H) 0.55 - 1.02 MG/DL    BUN/Creatinine ratio 22 (H) 12 - 20      GFR est AA 52 (L) >60 ml/min/1.73m2    GFR est non-AA 43 (L) >60 ml/min/1.73m2    Calcium 9.3 8.5 - 10.1 MG/DL   MAGNESIUM    Collection Time: 01/06/18 12:41 PM   Result Value Ref Range    Magnesium 2.0 1.6 - 2.4 mg/dL           Recent Labs       01/06/18   0439  01/05/18   1907  01/05/18   0400   NA  132*  130*  127*   K  2.7*  3.2*  2.9*   CL  84*  85*  80*   CO2 39*  39*  41*   BUN  33*  37*  40*   CREA  1.21*  1.35*  1.31*   GLU  224*  208*  251*   CA  8.7  8.5  8.6   MG  1.6  1.7  1.8          Recent Labs       01/04/18   2203   SGOT  25   ALT  35   AP  131*   TBILI  0.7   TP  8.5*   ALB  4.0   GLOB  4.5*      No results for input(s): INR, PTP, APTT in the last 72 hours.     No lab exists for component: INREXT, INREXT   No results for input(s): PH, PCO2, PO2, HCO3, FIO2 in the last 72 hours. No results for input(s): FE, TIBC, PSAT, FERR in the last 72 hours. No results found for: FOL, RBCF   No results for input(s): PH, PCO2, PO2 in the last 72 hours.        Recent Labs       01/05/18   0400  01/04/18   2230   TROIQ  <0.04  <0.04      No results found for: CHOL, CHOLX, CHLST, CHOLV, HDL, LDL, LDLC, DLDLP, TGLX, TRIGL, TRIGP, CHHD, CHHDX        Lab Results   Component Value Date/Time     Glucose (POC) 345 01/06/2018 11:32 AM     Glucose (POC) 219 01/06/2018 06:56 AM     Glucose (POC) 261 01/05/2018 09:05 PM     Glucose (POC) 231 01/05/2018 04:40 PM     Glucose (POC) 380 01/05/2018 11:48 AM            Lab Results   Component Value Date/Time     Color YELLOW/STRAW 01/04/2018 10:30 PM     Appearance CLEAR 01/04/2018 10:30 PM     Specific gravity 1.009 01/04/2018 10:30 PM     pH (UA) 6.0 01/04/2018 10:30 PM     Protein NEGATIVE  01/04/2018 10:30 PM     Glucose NEGATIVE  01/04/2018 10:30 PM     Ketone NEGATIVE  01/04/2018 10:30 PM     Bilirubin NEGATIVE  01/04/2018 10:30 PM     Urobilinogen 0.2 01/04/2018 10:30 PM     Nitrites NEGATIVE  01/04/2018 10:30 PM     Leukocyte Esterase NEGATIVE  01/04/2018 10:30 PM     Epithelial cells FEW 01/04/2018 10:30 PM     Bacteria NEGATIVE  01/04/2018 10:30 PM     WBC 0-4 01/04/2018 10:30 PM     RBC 0-5 01/04/2018 10:30 PM          All Micro Results      None          No results found for: SDES        Lab Results   Component Value Date/Time     Culture result: MODERATE  NORMAL RESPIRATORY LLOYD 03/10/2016 02:39 PM     Culture result: LIGHT  APPARENT  NIDA ALBICANS 03/10/2016 02:39 PM      RADIOLOGY:  * INDICATION: . short of breath  Additional history: Generalized weakness. Fall earlier today due to weakness. COMPARISON: Previous chest xray, 9/22/2017. LIMITATIONS: Portable technique. Florinda Valle FINDINGS: Single frontal view of the chest.   .  Lines/tubes/surgical: Cardiac monitor leads overly the patient. Heart/mediastinum: Unremarkable. Lungs/pleura:  No focal consolidation or mass. No visualized pleural effusion or  pneumothorax. Additional Comments: Obesity. .  IMPRESSION  IMPRESSION:  1. No radiographic evidence of acute cardiopulmonary disease. Signed Date/Time    Phone Pager   DAWIT Salcedo 1/04/2018 22:58 [phone]      * EXAM:  CT HEAD WO CONT  INDICATION:   weakness/fall  Generalized weakness and fatigue resulting in a fall earlier today. COMPARISON: None. .  TECHNIQUE:   Unenhanced CT of the head was performed using 5 mm images. Coronal and sagittal  reformats were produced. Brain and bone windows were generated. CT dose reduction was achieved through use of a standardized protocol tailored  for this examination and automatic exposure control for dose modulation. Florinda Valle FINDINGS:  Encephalomalacia in the right, anterior corona radiata/external capsule. Mild  degree of periventricular white matter hypoattenuation. There is no significant  white matter disease. There is no intracranial hemorrhage, extra-axial  collection, mass, mass effect or midline shift.  The basilar cisterns are open. No acute infarct is identified. Intracranial atherosclerosis. The bone windows  demonstrate hyperostosis frontalis. The visualized portions of the paranasal  sinuses and mastoid air cells are clear. Florinda Valle IMPRESSION  IMPRESSION:   1. Encephalomalacia in the right corona radiata/external capsule. 2. No evidence of acute intracranial abnormality by this modality.   Signed Date/Time    Phone Pager   DAWIT Salcedo 1/04/2018 22:44 [phone]      * HISTORY: Back pain.      COMPARISON: None.      TECHNIQUE:   Noncontrast axial CT imaging of the lumbar spine was performed. Coronal and sagittal reconstructions were obtained.  CT dose reduction was  achieved through the use of a standardized protocol tailored for this  examination and automatic exposure control for dose modulation.      FINDINGS:      There is no fracture or subluxation. There is no HNP or spinal stenosis. There  is interspinous and facet arthritis L4-5 and L5-S1. There is disc degeneration  at L5-S1.      T12-L1:  The spinal canal and neural foramina are widely patent.      L1-2:  The spinal canal and neural foramina are widely patent.      L2-3:  The spinal canal and neural foramina are widely patent.      L3-4:  The spinal canal and neural foramina are widely patent.      L4-5:  The spinal canal and neural foramina are widely patent.      L5-S1:  The spinal canal and neural foramina are widely patent.      IMPRESSION  IMPRESSION:      1. No acute finding. 2. Lumbosacral degenerative disc disease.   Signed Date/Time    Phone Pager   Avelina Woodruff 1/04/2018 22:49 [phone]      Medications reviewed         Current Facility-Administered Medications   Medication Dose Route Frequency    potassium chloride SR (KLOR-CON 10) tablet 40 mEq  40 mEq Oral BID    magnesium oxide (MAG-OX) tablet 800 mg  800 mg Oral DAILY    insulin glargine (LANTUS) injection 30 Units  30 Units SubCUTAneous QHS    enoxaparin (LOVENOX) injection 40 mg  40 mg SubCUTAneous Q24H    traMADol (ULTRAM) tablet 50 mg  50 mg Oral TID PRN    acetaminophen (TYLENOL) tablet 500 mg  500 mg Oral Q6H PRN    ondansetron (ZOFRAN) injection 4 mg  4 mg IntraVENous Q6H PRN    docusate sodium (COLACE) capsule 100 mg  100 mg Oral BID    zolpidem (AMBIEN) tablet 5 mg  5 mg Oral QHS PRN    aspirin delayed-release tablet 81 mg  81 mg Oral DAILY    atorvastatin (LIPITOR) tablet 80 mg  80 mg Oral DAILY  carvedilol (COREG) tablet 6.25 mg  6.25 mg Oral BID WITH MEALS    clopidogrel (PLAVIX) tablet 75 mg  75 mg Oral DAILY    fluticasone (FLONASE) 50 mcg/actuation nasal spray 2 Spray  2 Spray Both Nostrils DAILY    gabapentin (NEURONTIN) capsule 300 mg  300 mg Oral TID    montelukast (SINGULAIR) tablet 10 mg  10 mg Oral DAILY    pantoprazole (PROTONIX) tablet 40 mg  40 mg Oral ACB    albuterol-ipratropium (DUO-NEB) 2.5 MG-0.5 MG/3 ML  3 mL Nebulization Q6H PRN    0.9% sodium chloride infusion  100 mL/hr IntraVENous CONTINUOUS    ferrous sulfate tablet 325 mg  1 Tab Oral BID WITH MEALS    isosorbide mononitrate ER (IMDUR) tablet 30 mg  30 mg Oral BID    sodium chloride tablet 1 g  1 g Oral TID WITH MEALS    sertraline (ZOLOFT) tablet 100 mg  100 mg Oral DAILY    bumetanide (BUMEX) tablet 1 mg  1 mg Oral BID    insulin lispro (HUMALOG) injection    SubCUTAneous AC&HS    glucose chewable tablet 16 g  4 Tab Oral PRN    dextrose (D50W) injection syrg 12.5-25 g  12.5-25 g IntraVENous PRN    glucagon (GLUCAGEN) injection 1 mg  1 mg IntraMUSCular PRN    arformoterol (BROVANA) neb solution 15 mcg  15 mcg Nebulization BID RT     And    budesonide (PULMICORT) 500 mcg/2 ml nebulizer suspension  500 mcg Nebulizati      CHRONIC MEDICAL DIAGNOSES:  Problem List as of 1/6/2018  Date Reviewed: 3/10/2016          Codes Class Noted - Resolved    Hypokalemia ICD-10-CM: E87.6  ICD-9-CM: 276.8  1/4/2018 - Present        Respiratory failure ([facility] [address] ICD-10-CM: J96.90  ICD-9-CM: 518.81  3/10/2016 - Present        CAD (coronary artery disease) ICD-10-CM: I25.10  ICD-9-CM: 414.00  3/10/2016 - Present        HTN (hypertension) ICD-10-CM: I10  ICD-9-CM: 401.9  3/10/2016 - Present        CKD (chronic kidney disease) stage 3, GFR 30-59 ml/min ICD-10-CM: N18.3  ICD-9-CM: 585.3  3/10/2016 - Present        Acute respiratory failure ([facility] ICD-10-CM: J96.00  ICD-9-CM: 518.81  3/10/2016 - Present              Greater than 35 minutes were spent with the patient on counseling and coordination of care    Signed:   Fercho Galarza MD  1/6/2018  3:04 PM

## 2018-01-06 NOTE — PROGRESS NOTES
[facility] Cinthia Rol[address], [address] [facility]  [phone]                                     Hospitalist Service  Progress Note    Daily Progress Note: 1/6/2018    Assessment/Plan:   68-year-old female with:  1. Generalized weakness due to hypokalemia and acute on chronic hyponatremia, Contraction alkalosis chronically, with previous sodium level of 127 in 09/2017 due to diuretic.  s/p IV fluid 2L gently, p.o. sodium chloride, needs to remain on p.o. Kdur 40meq po bid with po bumex dosing, p.o. Magnesium ox 800mg po bid, with a potassium level goal of greater than 3.5, magnesium of greater than 2. Keep also on lower dose of bumex 1mg po bid, BMP / Mg check q week next due on 1/9/18 to decide dose of electrolyte replacement. 2.  History of chronic biventricular congestive heart failure, without any acute exacerbation. Keep on aspirin, keep on Bumex 2mg po bid, Plavix, Coreg, Imdur, p.o. potassium replacement, p.o. magnesium replacement Aldactone. Hold off on metolazone at [place]. Consider Diamox for diuresis (alternative to bumex) to prevent contraction alkalosis, elev Hco3 on BMP. 3.  History of major depressive disorder. Continue sertraline. 4.  History of chronic obstructive pulmonary disease. Keep on p.r.n. bronchodilator. 5.  History of iron-deficiency anemia. Continue ferrous sulfate. 6.  Ground level fall. CT of the lumbar spine shows degenerative joint disease. Continue with Neurontin t.i.d.  7.  Dyslipidemia, hyperlipidemia. Continue with atorvastatin 80 mg daily. 8.  History of type 2 diabetes mellitus, on sliding scale insulin - resume Januvia at AK, add Glipizide 5mg po qdinner, keep on home dose of Lantus 30 U Subq daily, take in the am. Monitor FSBS trend & follow-up with PCP. Pt state her home BG PTA are 250- 190's. 9.  History of gastroesophageal reflux disease. Keep on proton pump inhibitor.   10. Weakness and deconditioning. PT, OT recommend HH, and CM has set up [facility] with [facility]. 11. Morbid obesity- recommend outpt sleep study with PAR. VTE prophylaxis: lovenox subq  Discussed plan of care with Patient/Family and Nurse   Disposition:  discharge to home 18 with [facility]. Pt resides at [facility],  to help with [facility]. Subjective:   Pt seen and examined, needs to remain on po Kdur & po Mag while on diretics. Reports Home BG between 250- 199 at home regimen. Review of Systems:   A comprehensive review of systems was negative except for that written in the HPI.     Objective:   Physical examination  Visit Vitals    /50 (BP 1 Location: Left arm, BP Patient Position: At rest;Supine)    Pulse 72    Temp 98.4 °F (36.9 °C)    Resp 20    Ht 5' (1.524 m)    Wt 96.3 kg (212 lb 4.9 oz)    SpO2 92%    BMI 41.46 kg/m2      Temp (24hrs), Av.2 °F (36.8 °C), Min:97.2 °F (36.2 °C), Max:98.6 °F (37 °C)     O2 Flow Rate (L/min): 1 l/min   O2 Device: Room air  Patient Vitals for the past 24 hrs:   Temp Pulse Resp BP SpO2   18 1129 98.4 °F (36.9 °C) 72 20 124/50 92 %   18 0810 - - - - 95 %   18 0740 98 °F (36.7 °C) 78 16 155/83 94 %   18 0433 98.4 °F (36.9 °C) 87 18 142/83 92 %   18 0316 - 70 - - -   18 2330 98.6 °F (37 °C) (!) 117 16 117/67 91 %   18 2044 - - - - 94 %   18 1929 98.4 °F (36.9 °C) 79 18 112/69 91 %   18 1721 - 78 - 108/69 -   18 1510 97.2 °F (36.2 °C) 81 20 100/67 97 %       Intake/Output Summary (Last 24 hours) at 18 1224  Last data filed at 18 0800   Gross per 24 hour   Intake              480 ml   Output              700 ml   Net             -220 ml     Last shift:    701 - 1900  In: 240 [P.O.:240]  Out: -   Last 3 shifts:    1901 -  07  In: 240 [P.O.:240]  Out: 700 [Urine:700]    General:   Alert, cooperative, no acute distress   Head:   Atraumatic   Eyes:   Conjunctivae clear ENT:  Oral mucosa normal   Neck:  Supple, trachea midline, no adenopathy   No JVD   Back:    No CVA tenderness    Chest wall:    No tenderness or deformities    Lungs:   Clear to auscultation bilaterally    Heart:   Regular rhythm, no murmur   Abdomen:    Soft, non-tender   No masses or organomegaly    Extremities:  No edema or DVT signs   Pulses:  Symmetric all extremities   Skin:  Warm and dry    No rashes or lesions   Neurologic:  Oriented x3, CN 2-12 intact   No focal deficits   Psychiatric:  normal insight; normal affect         Data Review:   Reviewed Lab & radiology  Telemetry NSR    Recent Labs      01/04/18   2203   WBC  8.5   HGB  12.4   HCT  37.7   PLT  281     Recent Labs      01/06/18   0439  01/05/18   1907  01/05/18   0400   NA  132*  130*  127*   K  2.7*  3.2*  2.9*   CL  84*  85*  80*   CO2  39*  39*  41*   BUN  33*  37*  40*   CREA  1.21*  1.35*  1.31*   GLU  224*  208*  251*   CA  8.7  8.5  8.6   MG  1.6  1.7  1.8     Recent Labs      01/04/18 2203   SGOT  25   ALT  35   AP  131*   TBILI  0.7   TP  8.5*   ALB  4.0   GLOB  4.5*     No results for input(s): INR, PTP, APTT in the last 72 hours. No lab exists for component: INREXT, INREXT   No results for input(s): PH, PCO2, PO2, HCO3, FIO2 in the last 72 hours. No results for input(s): FE, TIBC, PSAT, FERR in the last 72 hours. No results found for: FOL, RBCF   No results for input(s): PH, PCO2, PO2 in the last 72 hours.   Recent Labs      01/05/18   0400  01/04/18   2230   TROIQ  <0.04  <0.04     No results found for: CHOL, CHOLX, CHLST, CHOLV, HDL, LDL, LDLC, DLDLP, TGLX, TRIGL, TRIGP, CHHD, CHHDX  Lab Results   Component Value Date/Time    Glucose (POC) 345 01/06/2018 11:32 AM    Glucose (POC) 219 01/06/2018 06:56 AM    Glucose (POC) 261 01/05/2018 09:05 PM    Glucose (POC) 231 01/05/2018 04:40 PM    Glucose (POC) 380 01/05/2018 11:48 AM     Lab Results   Component Value Date/Time    Color YELLOW/STRAW 01/04/2018 10:30 PM    Appearance CLEAR 01/04/2018 10:30 PM    Specific gravity 1.009 01/04/2018 10:30 PM    pH (UA) 6.0 01/04/2018 10:30 PM    Protein NEGATIVE  01/04/2018 10:30 PM    Glucose NEGATIVE  01/04/2018 10:30 PM    Ketone NEGATIVE  01/04/2018 10:30 PM    Bilirubin NEGATIVE  01/04/2018 10:30 PM    Urobilinogen 0.2 01/04/2018 10:30 PM    Nitrites NEGATIVE  01/04/2018 10:30 PM    Leukocyte Esterase NEGATIVE  01/04/2018 10:30 PM    Epithelial cells FEW 01/04/2018 10:30 PM    Bacteria NEGATIVE  01/04/2018 10:30 PM    WBC 0-4 01/04/2018 10:30 PM    RBC 0-5 01/04/2018 10:30 PM     All Micro Results     None        No results found for: SDES  Lab Results   Component Value Date/Time    Culture result: MODERATE  NORMAL RESPIRATORY LLOYD   03/10/2016 02:39 PM    Culture result: LIGHT  APPARENT  CANDIDA ALBICANS   03/10/2016 02:39 PM     RADIOLOGY:  * INDICATION: . short of breath  Additional history: Generalized weakness. Fall earlier today due to weakness. COMPARISON: Previous chest xray, 9/22/2017. LIMITATIONS: Portable technique. [facility] FINDINGS: Single frontal view of the chest.   .  Lines/tubes/surgical: Cardiac monitor leads overly the patient. Heart/mediastinum: Unremarkable. Lungs/pleura:  No focal consolidation or mass. No visualized pleural effusion or  pneumothorax. Additional Comments: Obesity. .  IMPRESSION  IMPRESSION:  1. No radiographic evidence of acute cardiopulmonary disease. Signed Date/Time    Phone Pager     HERMANN DAWIT AMAYA 1/04/2018 22:58 [phone]     * EXAM:  CT HEAD WO CONT  INDICATION:   weakness/fall  Generalized weakness and fatigue resulting in a fall earlier today. COMPARISON: None. .  TECHNIQUE:   Unenhanced CT of the head was performed using 5 mm images. Coronal and sagittal  reformats were produced. Brain and bone windows were generated.    CT dose reduction was achieved through use of a standardized protocol tailored  for this examination and automatic exposure control for dose modulation. Luciana Foreman FINDINGS:  Encephalomalacia in the right, anterior corona radiata/external capsule. Mild  degree of periventricular white matter hypoattenuation. There is no significant  white matter disease. There is no intracranial hemorrhage, extra-axial  collection, mass, mass effect or midline shift. The basilar cisterns are open. No acute infarct is identified. Intracranial atherosclerosis. The bone windows  demonstrate hyperostosis frontalis. The visualized portions of the paranasal  sinuses and mastoid air cells are clear. Luciana Foreman IMPRESSION  IMPRESSION:   1. Encephalomalacia in the right corona radiata/external capsule. 2. No evidence of acute intracranial abnormality by this modality. Signed Date/Time    Phone Pager     DAWIT MCCRARY 1/04/2018 22:44 [phone]     * HISTORY: Back pain.     COMPARISON: None.     TECHNIQUE:   Noncontrast axial CT imaging of the lumbar spine was performed. Coronal and sagittal reconstructions were obtained. CT dose reduction was  achieved through the use of a standardized protocol tailored for this  examination and automatic exposure control for dose modulation.     FINDINGS:     There is no fracture or subluxation. There is no HNP or spinal stenosis. There  is interspinous and facet arthritis L4-5 and L5-S1. There is disc degeneration  at L5-S1.     T12-L1:  The spinal canal and neural foramina are widely patent.     L1-2:  The spinal canal and neural foramina are widely patent.     L2-3:  The spinal canal and neural foramina are widely patent.     L3-4:  The spinal canal and neural foramina are widely patent.     L4-5:  The spinal canal and neural foramina are widely patent.     L5-S1:  The spinal canal and neural foramina are widely patent.     IMPRESSION  IMPRESSION:     1. No acute finding. 2. Lumbosacral degenerative disc disease.   Signed Date/Time    Phone Pager     Eliane Rudd 1/04/2018 22:49 [phone]     Medications reviewed  Current Facility-Administered Medications   Medication Dose Route Frequency    potassium chloride SR (KLOR-CON 10) tablet 40 mEq  40 mEq Oral BID    magnesium oxide (MAG-OX) tablet 800 mg  800 mg Oral DAILY    insulin glargine (LANTUS) injection 30 Units  30 Units SubCUTAneous QHS    enoxaparin (LOVENOX) injection 40 mg  40 mg SubCUTAneous Q24H    traMADol (ULTRAM) tablet 50 mg  50 mg Oral TID PRN    acetaminophen (TYLENOL) tablet 500 mg  500 mg Oral Q6H PRN    ondansetron (ZOFRAN) injection 4 mg  4 mg IntraVENous Q6H PRN    docusate sodium (COLACE) capsule 100 mg  100 mg Oral BID    zolpidem (AMBIEN) tablet 5 mg  5 mg Oral QHS PRN    aspirin delayed-release tablet 81 mg  81 mg Oral DAILY    atorvastatin (LIPITOR) tablet 80 mg  80 mg Oral DAILY    carvedilol (COREG) tablet 6.25 mg  6.25 mg Oral BID WITH MEALS    clopidogrel (PLAVIX) tablet 75 mg  75 mg Oral DAILY    fluticasone (FLONASE) 50 mcg/actuation nasal spray 2 Spray  2 Spray Both Nostrils DAILY    gabapentin (NEURONTIN) capsule 300 mg  300 mg Oral TID    montelukast (SINGULAIR) tablet 10 mg  10 mg Oral DAILY    pantoprazole (PROTONIX) tablet 40 mg  40 mg Oral ACB    albuterol-ipratropium (DUO-NEB) 2.5 MG-0.5 MG/3 ML  3 mL Nebulization Q6H PRN    0.9% sodium chloride infusion  100 mL/hr IntraVENous CONTINUOUS    ferrous sulfate tablet 325 mg  1 Tab Oral BID WITH MEALS    isosorbide mononitrate ER (IMDUR) tablet 30 mg  30 mg Oral BID    sodium chloride tablet 1 g  1 g Oral TID WITH MEALS    sertraline (ZOLOFT) tablet 100 mg  100 mg Oral DAILY    bumetanide (BUMEX) tablet 1 mg  1 mg Oral BID    insulin lispro (HUMALOG) injection   SubCUTAneous AC&HS    glucose chewable tablet 16 g  4 Tab Oral PRN    dextrose (D50W) injection syrg 12.5-25 g  12.5-25 g IntraVENous PRN    glucagon (GLUCAGEN) injection 1 mg  1 mg IntraMUSCular PRN    arformoterol (BROVANA) neb solution 15 mcg  15 mcg Nebulization BID RT    And    budesonide (PULMICORT) 500 mcg/2 ml nebulizer suspension  500 mcg Nebulization BID RT     Signed:   Jose G Florian MD   Internist / Hospitalist

## 2018-01-06 NOTE — DISCHARGE INSTRUCTIONS
[place] [id number] [facility], [address]  Krissy Hernandez 2     Discharge Instructions       PATIENT ID: Akosua Parham  MRN: [id number]   YOB: 1950    DATE OF ADMISSION: 1/4/2018  9:42 PM    DATE OF DISCHARGE: 1/6/2018    PRIMARY CARE PROVIDER: Aylin De Leon DO       DISCHARGING PHYSICIAN: Obed Daigle MD    To contact this individual call [phone]. DISCHARGE DIAGNOSES Acute low K, Low Na, contraction alkalosis    CONSULTATIONS: IP CONSULT TO PRIMARY CARE PROVIDER    PROCEDURES/SURGERIES: * No surgery found *    PENDING TEST RESULTS:   At the time of discharge the following test results are still pending: None    FOLLOW UP APPOINTMENTS:   Follow-up Information     Follow up With Details Vance Mathur from [facility] will contact you regarding times to come to your home for therapies. [address], DO In 2 days BMP / Mg /Phos due q weely next due on 1/9/18, Consider Diamox )alternative to bumex for diuresis) if bicarb level high on BMP  2229 [id number] Yuli Gilbert MD In 1 week CHF management [facility]  [phone]      [facility].  Sleep study Pls order referral [address]  [facility] [place] [address]             ADDITIONAL CARE RECOMMENDATIONS:   FSBS check qid before meals & at bedtime, carry record for PCP office. Daily weight, write trend, carry record to PCP office. DIET: Cardiac Diet and Diabetic Diet, Limit po fluid to 2L/day, avoid conc sweet    ACTIVITY: Activity as tolerated    WOUND CARE: None    EQUIPMENT needed: None      DISCHARGE MEDICATIONS:   See Medication Reconciliation Form    · It is important that you take the medication exactly as they are prescribed. · Keep your medication in the bottles provided by the pharmacist and keep a list of the medication names, dosages, and times to be taken in your wallet. · Do not take other medications without consulting your doctor. NOTIFY YOUR PHYSICIAN FOR ANY OF THE FOLLOWING:   Fever over 101 degrees for 24 hours. Chest pain, shortness of breath, fever, chills, nausea, vomiting, diarrhea, change in mentation, falling, weakness, bleeding. Severe pain or pain not relieved by medications. Or, any other signs or symptoms that you may have questions about.       DISPOSITION:  x  Home With:  x OT x PT x HH  RN       SNF/Inpatient Rehab/LTAC    Independent/assisted living    Hospice    Other:     CDMP Checked:   Yes Y       Signed:   Julieta Ocasio MD  1/6/2018  12:39 PM

## 2018-01-06 NOTE — PROGRESS NOTES
0800 - Bedside report received from Ruben Shaw, [address] [facility]. Pt resting in bed in NAD.     1200 - Dr. Wale Espinosa at bedside to assess pt. Will recheck potassium and if WDL, pt ready for discharge. Discharge orders placed. --- K 3.8. Preparing pt for discharge. 1400 - Received callback from [facility] with [facility], they will call pt tomorrow, and see her Monday. --- Pt's son will pick her up this afternoon. 1545 - Discharge instructions completed with patient. AVS signed and placed on chart. PCT assisted pt in getting dressed, PIV removed. Pt transported to discharge via wheelchair, where she was picked up by son.

## 2018-01-06 NOTE — PROGRESS NOTES
1930 - Bedside shift change report given to Tania Acevedo RN (oncoming nurse) by Lynda aLi RN (offgoing nurse). Report included the following information SBAR, Kardex, ED Summary, Procedure Summary, Intake/Output, MAR, Recent Results and Med Rec Status. Opportunity for questions provided  q1h rounds completed during shift.

## 2018-07-06 ENCOUNTER — APPOINTMENT (OUTPATIENT)
Dept: GENERAL RADIOLOGY | Age: 68
End: 2018-07-06
Attending: EMERGENCY MEDICINE
Payer: MEDICARE

## 2018-07-06 ENCOUNTER — APPOINTMENT (OUTPATIENT)
Dept: CT IMAGING | Age: 68
End: 2018-07-06
Attending: EMERGENCY MEDICINE
Payer: MEDICARE

## 2018-07-06 ENCOUNTER — HOSPITAL ENCOUNTER (EMERGENCY)
Age: 68
Discharge: HOME | End: 2018-07-07
Attending: EMERGENCY MEDICINE
Payer: MEDICARE

## 2018-07-06 DIAGNOSIS — J44.1 ACUTE EXACERBATION OF CHRONIC OBSTRUCTIVE PULMONARY DISEASE (COPD) (HCC): Primary | ICD-10-CM

## 2018-07-06 DIAGNOSIS — S30.1XXA HEMATOMA OF LEFT FLANK, INITIAL ENCOUNTER: ICD-10-CM
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
## 2018-07-06 DIAGNOSIS — R10.84 ABDOMINAL PAIN, GENERALIZED: ICD-10-CM

## 2018-07-06 DIAGNOSIS — Z79.01 CHRONIC ANTICOAGULATION: ICD-10-CM

## 2018-07-06 LAB
ALBUMIN SERPL-MCNC: 3.9 G/DL (ref 3.5–5)
ALBUMIN/GLOB SERPL: 0.9 {RATIO} (ref 1.1–2.2)
ALP SERPL-CCNC: 145 U/L (ref 45–117)
ALT SERPL-CCNC: 21 U/L (ref 12–78)
ANION GAP SERPL CALC-SCNC: 5 MMOL/L (ref 5–15)
AST SERPL-CCNC: 24 U/L (ref 15–37)
BILIRUB SERPL-MCNC: 0.4 MG/DL (ref 0.2–1)
BNP SERPL-MCNC: 213 PG/ML (ref 0–125)
BUN SERPL-MCNC: 45 MG/DL (ref 6–20)
BUN/CREAT SERPL: 25 (ref 12–20)
CALCIUM SERPL-MCNC: 9.4 MG/DL (ref 8.5–10.1)
CHLORIDE SERPL-SCNC: 87 MMOL/L (ref 97–108)
CO2 SERPL-SCNC: 40 MMOL/L (ref 21–32)
CREAT SERPL-MCNC: 1.79 MG/DL (ref 0.55–1.02)
ERYTHROCYTE [DISTWIDTH] IN BLOOD BY AUTOMATED COUNT: 14.5 % (ref 11.5–14.5)
GLOBULIN SER CALC-MCNC: 4.4 G/DL (ref 2–4)
GLUCOSE SERPL-MCNC: 127 MG/DL (ref 65–100)
HCT VFR BLD AUTO: 36.2 % (ref 35–47)
HGB BLD-MCNC: 11 G/DL (ref 11.5–16)
LIPASE SERPL-CCNC: 436 U/L (ref 73–393)
MCH RBC QN AUTO: 26.2 PG (ref 26–34)
MCHC RBC AUTO-ENTMCNC: 30.4 G/DL (ref 30–36.5)
MCV RBC AUTO: 86.2 FL (ref 80–99)
NRBC # BLD: 0 K/UL (ref 0–0.01)
NRBC BLD-RTO: 0 PER 100 WBC
PLATELET # BLD AUTO: 219 K/UL (ref 150–400)
PMV BLD AUTO: 11.4 FL (ref 8.9–12.9)
POTASSIUM SERPL-SCNC: 3.2 MMOL/L (ref 3.5–5.1)
PROT SERPL-MCNC: 8.3 G/DL (ref 6.4–8.2)
RBC # BLD AUTO: 4.2 M/UL (ref 3.8–5.2)
SODIUM SERPL-SCNC: 132 MMOL/L (ref 136–145)
TROPONIN I SERPL-MCNC: <0.05 NG/ML
WBC # BLD AUTO: 8.1 K/UL (ref 3.6–11)

## 2018-07-06 PROCEDURE — 85610 PROTHROMBIN TIME: CPT | Performed by: EMERGENCY MEDICINE

## 2018-07-06 PROCEDURE — 94640 AIRWAY INHALATION TREATMENT: CPT

## 2018-07-06 PROCEDURE — 84484 ASSAY OF TROPONIN QUANT: CPT | Performed by: EMERGENCY MEDICINE

## 2018-07-06 PROCEDURE — 71046 X-RAY EXAM CHEST 2 VIEWS: CPT

## 2018-07-06 PROCEDURE — 83690 ASSAY OF LIPASE: CPT | Performed by: EMERGENCY MEDICINE

## 2018-07-06 PROCEDURE — 96374 THER/PROPH/DIAG INJ IV PUSH: CPT

## 2018-07-06 PROCEDURE — 99285 EMERGENCY DEPT VISIT HI MDM: CPT

## 2018-07-06 PROCEDURE — 36415 COLL VENOUS BLD VENIPUNCTURE: CPT | Performed by: EMERGENCY MEDICINE

## 2018-07-06 PROCEDURE — 85730 THROMBOPLASTIN TIME PARTIAL: CPT | Performed by: EMERGENCY MEDICINE

## 2018-07-06 PROCEDURE — 83880 ASSAY OF NATRIURETIC PEPTIDE: CPT | Performed by: EMERGENCY MEDICINE

## 2018-07-06 PROCEDURE — 85027 COMPLETE CBC AUTOMATED: CPT | Performed by: EMERGENCY MEDICINE

## 2018-07-06 PROCEDURE — 96375 TX/PRO/DX INJ NEW DRUG ADDON: CPT

## 2018-07-06 PROCEDURE — 80053 COMPREHEN METABOLIC PANEL: CPT | Performed by: EMERGENCY MEDICINE

## 2018-07-06 PROCEDURE — 74011000250 HC RX REV CODE- 250: Performed by: EMERGENCY MEDICINE

## 2018-07-06 PROCEDURE — 74176 CT ABD & PELVIS W/O CONTRAST: CPT

## 2018-07-06 PROCEDURE — 77030029684 HC NEB SM VOL KT MONA -A

## 2018-07-06 PROCEDURE — 93005 ELECTROCARDIOGRAM TRACING: CPT

## 2018-07-06 PROCEDURE — 74011250636 HC RX REV CODE- 250/636: Performed by: EMERGENCY MEDICINE

## 2018-07-06 RX ORDER — MORPHINE SULFATE 2 MG/ML
4 INJECTION, SOLUTION INTRAMUSCULAR; INTRAVENOUS
Status: COMPLETED | OUTPATIENT
Start: 2018-07-06 | End: 2018-07-06

## 2018-07-06 RX ORDER — IPRATROPIUM BROMIDE AND ALBUTEROL SULFATE 2.5; .5 MG/3ML; MG/3ML
3 SOLUTION RESPIRATORY (INHALATION)
Status: COMPLETED | OUTPATIENT
Start: 2018-07-06 | End: 2018-07-06

## 2018-07-06 RX ADMIN — MORPHINE SULFATE 4 MG: 2 INJECTION, SOLUTION INTRAMUSCULAR; INTRAVENOUS at 23:36

## 2018-07-06 RX ADMIN — METHYLPREDNISOLONE SODIUM SUCCINATE 125 MG: 125 INJECTION, POWDER, FOR SOLUTION INTRAMUSCULAR; INTRAVENOUS at 23:36

## 2018-07-06 RX ADMIN — IPRATROPIUM BROMIDE AND ALBUTEROL SULFATE 3 ML: .5; 3 SOLUTION RESPIRATORY (INHALATION) at 23:36

## 2018-07-07 VITALS
WEIGHT: 216 LBS | OXYGEN SATURATION: 89 % | TEMPERATURE: 98.8 F | BODY MASS INDEX: 40.78 KG/M2 | DIASTOLIC BLOOD PRESSURE: 87 MMHG | HEIGHT: 61 IN | SYSTOLIC BLOOD PRESSURE: 103 MMHG | RESPIRATION RATE: 13 BRPM | HEART RATE: 66 BPM

## 2018-07-07 LAB
ATRIAL RATE: 67 BPM
CALCULATED P AXIS, ECG09: 71 DEGREES
CALCULATED R AXIS, ECG10: 39 DEGREES
CALCULATED T AXIS, ECG11: 42 DEGREES
DIAGNOSIS, 93000: NORMAL
P-R INTERVAL, ECG05: 136 MS
Q-T INTERVAL, ECG07: 460 MS
QRS DURATION, ECG06: 114 MS
QTC CALCULATION (BEZET), ECG08: 486 MS
VENTRICULAR RATE, ECG03: 67 BPM

## 2018-07-07 RX ORDER — OXYCODONE AND ACETAMINOPHEN 5; 325 MG/1; MG/1
1 TABLET ORAL
Qty: 10 TAB | Refills: 0 | Status: SHIPPED | OUTPATIENT
Start: 2018-07-07 | End: 2019-06-17

## 2018-07-07 RX ORDER — PREDNISONE 50 MG/1
50 TABLET ORAL DAILY
Qty: 4 TAB | Refills: 0 | Status: SHIPPED | OUTPATIENT
Start: 2018-07-07 | End: 2018-07-11

## 2018-07-07 NOTE — ED NOTES
Dr Massiel Rossi has reviewed discharge instructions with the patient. The patient verbalized understanding. Pt. A&Ox4, respirations even and unlabored. VS stable as noted in flowsheet. Pt provided wheelchair assist from department; paperwork in hand.

## 2018-07-07 NOTE — ED NOTES
___Wong_____________________ in to talk with patient and explain plan of care with  understanding and  written & verbal instructions.

## 2018-07-07 NOTE — ED PROVIDER NOTES
EMERGENCY DEPARTMENT HISTORY AND PHYSICAL EXAM      Date: 7/6/2018  Patient Name: Nik Blood    History of Presenting Illness     Chief Complaint   Patient presents with    Fall     pain left side after fell off commode x 3 days ago & notes some SOB       History Provided By: Patient    HPI: Nik Blood, 70 y.o. female with PMHx significant for asthma, HTN, CAD, MI, CKD, and COPD, presents via wheelchair to the ED with cc of worsening L back pain s/p GLF 3 days ago. Pt reports that she was sitting on commode when she fell asleep and fell over to the ground. She reports that her L back struck the bathtub, eliciting a bruise. Pt states that today, the pain was \"unbearable. \" She also reports of nausea secondary to back pain. Pt states that she took tramadol with no relief. She denies CP, hematuria, CP, dysuria, or vomiting. Pt reports that she is on Plavix and ASA. There are no other complaints, changes, or physical findings at this time. Social Hx: +Tobacco, -EtOH, -Illicit Drug Use    PCP: Sallie Rebolledo, DO    Current Outpatient Prescriptions   Medication Sig Dispense Refill    bumetanide (BUMEX) 2 mg tablet Take 1 Tab by mouth two (2) times a day. 2 Tab 0    potassium chloride SR (K-TAB) 20 mEq tablet Take 3 Tabs by mouth two (2) times a day. Take with bumex 180 Tab 0    magnesium oxide (MAG-OX) 400 mg tablet Take 2 Tabs by mouth two (2) times a day. Take with bumex 120 Tab 0    glipiZIDE (GLUCOTROL) 5 mg tablet Take 1 Tab by mouth daily (with dinner). 30 Tab 2    carvedilol (COREG) 6.25 mg tablet Take 6.25 mg by mouth two (2) times daily (with meals).  traMADol (ULTRAM) 50 mg tablet Take 50 mg by mouth three (3) times daily as needed (back pain).  nitroglycerin (NITROSTAT) 0.4 mg SL tablet by SubLINGual route every five (5) minutes as needed for Chest Pain.  tiotropium (SPIRIVA WITH HANDIHALER) 18 mcg inhalation capsule Take 1 Cap by inhalation daily.  spironolactone (ALDACTONE) 25 mg tablet Take 25 mg by mouth daily.  SITagliptin (JANUVIA) 100 mg tablet Take 100 mg by mouth daily.  isosorbide mononitrate ER (IMDUR) 30 mg tablet Take 30 mg by mouth daily.  ferrous sulfate 325 mg (65 mg iron) tablet Take 325 mg by mouth two (2) times daily (with meals).  insulin glargine (LANTUS) 100 unit/mL injection 30 Units by SubCUTAneous route nightly.  albuterol (PROVENTIL VENTOLIN) 2.5 mg /3 mL (0.083 %) nebulizer solution 3 mL by Nebulization route every four (4) hours as needed for Wheezing or Shortness of Breath. 24 Each 3    benzonatate (TESSALON) 100 mg capsule Take 1 Cap by mouth three (3) times daily as needed for Cough. 30 Cap 0    ipratropium (ATROVENT) 0.02 % nebulizer solution 2.5 mL by Nebulization route every six (6) hours as needed for Wheezing. 25 mL 3    aspirin delayed-release 81 mg tablet Take 81 mg by mouth daily.  atorvastatin (LIPITOR) 80 mg tablet Take 80 mg by mouth daily.  budesonide-formoterol (SYMBICORT) 160-4.5 mcg/actuation HFA inhaler Take 2 Puffs by inhalation two (2) times a day.  acetaminophen (TYLENOL) 500 mg tablet Take 500 mg by mouth every four (4) hours. Indications: BACK PAIN      omeprazole (PRILOSEC) 20 mg capsule Take 20 mg by mouth Before breakfast and dinner.  gabapentin (NEURONTIN) 300 mg capsule Take 300 mg by mouth three (3) times daily.  montelukast (SINGULAIR) 10 mg tablet Take 10 mg by mouth daily.  cholecalciferol (VITAMIN D3) 1,000 unit tablet Take 1,000 Units by mouth daily.  clopidogrel (PLAVIX) 75 mg tablet Take 75 mg by mouth daily.  fluticasone (FLONASE) 50 mcg/actuation nasal spray 2 Sprays by Both Nostrils route daily.  sertraline (ZOLOFT) 100 mg tablet Take 100 mg by mouth daily.  loratadine (CLARITIN) 10 mg tablet Take 10 mg by mouth daily as needed for Allergies or Rhinitis.       albuterol (PROVENTIL HFA, VENTOLIN HFA) 90 mcg/actuation inhaler Take 2 Puffs by inhalation every four (4) hours as needed for Wheezing or Shortness of Breath. Past History     Past Medical History:  Past Medical History:   Diagnosis Date    Asthma     CAD (coronary artery disease)     Chronic kidney disease     Chronic obstructive pulmonary disease ([facility] [address]     H/O acute myocardial infarction     Heart failure (HCC)     Hypertension        Past Surgical History:  Past Surgical History:   Procedure Laterality Date    HX CHOLECYSTECTOMY      gall stones removed    HX GYN         Family History:  No family history on file. Social History:  Social History   Substance Use Topics    Smoking status: Current Every Day Smoker    Smokeless tobacco: Never Used    Alcohol use No       Allergies: Allergies   Allergen Reactions    Garlic Shortness of Breath    Metformin Nausea and Vomiting    Pcn [Penicillins] Hives         Review of Systems   Review of Systems   Constitutional: Negative. Negative for chills and fever. HENT: Negative. Negative for congestion, facial swelling, rhinorrhea, sore throat, trouble swallowing and voice change. Eyes: Negative. Respiratory: Positive for shortness of breath. Negative for apnea, cough, chest tightness and wheezing. Cardiovascular: Negative. Negative for chest pain, palpitations and leg swelling. Gastrointestinal: Positive for nausea. Negative for abdominal distention, abdominal pain, blood in stool, constipation, diarrhea and vomiting. Endocrine: Negative. Negative for cold intolerance, heat intolerance and polyuria. Genitourinary: Negative. Negative for difficulty urinating, dysuria, flank pain, frequency, hematuria and urgency. Musculoskeletal: Positive for back pain (L back). Negative for arthralgias, myalgias, neck pain and neck stiffness. Skin: Negative. Negative for color change and rash. +Bruise on L back   Neurological: Negative.   Negative for dizziness, syncope, facial asymmetry, speech difficulty, weakness, light-headedness, numbness and headaches. Hematological: Negative. Does not bruise/bleed easily. Psychiatric/Behavioral: Negative. Negative for confusion and self-injury. The patient is not nervous/anxious. Physical Exam   Physical Exam   Constitutional: She is oriented to person, place, and time. She appears well-developed and well-nourished. No distress. HENT:   Head: Normocephalic and atraumatic. Mouth/Throat: Oropharynx is clear and moist. No oropharyngeal exudate. Eyes: Conjunctivae and EOM are normal. Pupils are equal, round, and reactive to light. Neck: Normal range of motion. Cardiovascular: Normal rate, regular rhythm and normal heart sounds. Exam reveals no gallop and no friction rub. No murmur heard. Pulmonary/Chest: Effort normal. No respiratory distress. She has wheezes. She has no rales. She exhibits no tenderness. Wheezing bll bases. Abdominal: Soft. Bowel sounds are normal. She exhibits no distension and no mass. There is no tenderness. There is no rebound and no guarding. Musculoskeletal: Normal range of motion. She exhibits no edema, tenderness or deformity. Neurological: She is alert and oriented to person, place, and time. She displays normal reflexes. No cranial nerve deficit. She exhibits normal muscle tone. Coordination normal.   Skin: Skin is warm. Ecchymosis noted. No rash noted. She is not diaphoretic. Large ecchymosis on L flank. Psychiatric: She has a normal mood and affect. Nursing note and vitals reviewed.       Diagnostic Study Results     Labs -     Recent Results (from the past 12 hour(s))   CBC W/O DIFF    Collection Time: 07/06/18 10:44 PM   Result Value Ref Range    WBC 8.1 3.6 - 11.0 K/uL    RBC 4.20 3.80 - 5.20 M/uL    HGB 11.0 (L) 11.5 - 16.0 g/dL    HCT 36.2 35.0 - 47.0 %    MCV 86.2 80.0 - 99.0 FL    MCH 26.2 26.0 - 34.0 PG    MCHC 30.4 30.0 - 36.5 g/dL    RDW 14.5 11.5 - 14.5 % PLATELET 429 392 - 902 K/uL    MPV 11.4 8.9 - 12.9 FL    NRBC 0.0 0  WBC    ABSOLUTE NRBC 0.00 0.00 - 7.12 K/uL   METABOLIC PANEL, COMPREHENSIVE    Collection Time: 07/06/18 10:44 PM   Result Value Ref Range    Sodium 132 (L) 136 - 145 mmol/L    Potassium 3.2 (L) 3.5 - 5.1 mmol/L    Chloride 87 (L) 97 - 108 mmol/L    CO2 40 (H) 21 - 32 mmol/L    Anion gap 5 5 - 15 mmol/L    Glucose 127 (H) 65 - 100 mg/dL    BUN 45 (H) 6 - 20 MG/DL    Creatinine 1.79 (H) 0.55 - 1.02 MG/DL    BUN/Creatinine ratio 25 (H) 12 - 20      GFR est AA 34 (L) >60 ml/min/1.73m2    GFR est non-AA 28 (L) >60 ml/min/1.73m2    Calcium 9.4 8.5 - 10.1 MG/DL    Bilirubin, total 0.4 0.2 - 1.0 MG/DL    ALT (SGPT) 21 12 - 78 U/L    AST (SGOT) 24 15 - 37 U/L    Alk.  phosphatase 145 (H) 45 - 117 U/L    Protein, total 8.3 (H) 6.4 - 8.2 g/dL    Albumin 3.9 3.5 - 5.0 g/dL    Globulin 4.4 (H) 2.0 - 4.0 g/dL    A-G Ratio 0.9 (L) 1.1 - 2.2     LIPASE    Collection Time: 07/06/18 10:44 PM   Result Value Ref Range    Lipase 436 (H) 73 - 393 U/L   NT-PRO BNP    Collection Time: 07/06/18 10:44 PM   Result Value Ref Range    NT pro- (H) 0 - 125 PG/ML   TROPONIN I    Collection Time: 07/06/18 10:44 PM   Result Value Ref Range    Troponin-I, Qt. <0.05 <0.05 ng/mL   EKG, 12 LEAD, INITIAL    Collection Time: 07/06/18 11:36 PM   Result Value Ref Range    Ventricular Rate 67 BPM    Atrial Rate 67 BPM    P-R Interval 136 ms    QRS Duration 114 ms    Q-T Interval 460 ms    QTC Calculation (Bezet) 486 ms    Calculated P Axis 71 degrees    Calculated R Axis 39 degrees    Calculated T Axis 42 degrees    Diagnosis       Normal sinus rhythm  Normal ECG  When compared with ECG of 04-JAN-2018 21:49,  Nonspecific T wave abnormality no longer evident in Inferior leads         Radiologic Studies -   CT ABD PELV WO CONT   Final Result      XR CHEST PA LAT   Final Result        CT Results  (Last 48 hours)               07/06/18 6404  CT ABD PELV WO CONT Final result Impression:  IMPRESSION:   No acute evidence for acute intra-abdominal trauma   Nonobstructing right renal calculus   Incidental multiple right renal cysts, fat-containing umbilical hernia,   bilateral facet arthropathy. Narrative:  EXAM:  CT ABD PELV WO CONT       INDICATION: acute left flank pain s/p fall, r/o splenic injury, other   intraabdominal process       COMPARISON: 5/22/2014       CONTRAST:  None. TECHNIQUE:    Thin axial images were obtained through the abdomen and pelvis. Coronal and   sagittal reconstructions were generated. Oral contrast was not administered. CT   dose reduction was achieved through use of a standardized protocol tailored for   this examination and automatic exposure control for dose modulation. The absence of intravenous contrast material reduces the sensitivity for   evaluation of the solid parenchymal organs of the abdomen. FINDINGS:    LUNG BASES: Clear. INCIDENTALLY IMAGED HEART AND MEDIASTINUM: Unremarkable. LIVER: No mass or biliary dilatation. GALLBLADDER: Surgically absent   SPLEEN: No mass. PANCREAS: No mass or ductal dilatation. ADRENALS: Unremarkable. KIDNEYS/URETERS: No mass or hydronephrosis. 2 mm nonobstructing right upper   renal pole calculus. Right renal 1 cm hypodensity and second 5.4 cm bilobed   hypodensity and third 2.2 cm hypodensity. All of these have Hounsfield unit   measurements between 3 and 5. STOMACH: Unremarkable. SMALL BOWEL: No dilatation or wall thickening. COLON: No dilatation or wall thickening. APPENDIX: Unremarkable. Axial image 51   PERITONEUM: No ascites or pneumoperitoneum. RETROPERITONEUM: No lymphadenopathy or aortic aneurysm. REPRODUCTIVE ORGANS: Anteverted uterus   URINARY BLADDER: No mass or calculus. BONES: No destructive bone lesion. Bilateral facet arthropathy L4-5 and L5-S1.    ADDITIONAL COMMENTS: Fat-containing umbilical hernia               CXR Results  (Last 48 hours) 07/06/18 2300  XR CHEST PA LAT Final result    Impression:  IMPRESSION:   NORMAL CHEST. Narrative:  History: Kirsten Stefan off commode. Frontal and lateral views of the chest show clear lungs. There is subsegmental   atelectasis at the left base. The heart, mediastinum and pulmonary vasculature   are normal.  The bony thorax is unremarkable. Medical Decision Making   I am the first provider for this patient. I reviewed the vital signs, available nursing notes, past medical history, past surgical history, family history and social history. Vital Signs-Reviewed the patient's vital signs. Patient Vitals for the past 12 hrs:   Temp Pulse Resp BP SpO2   07/07/18 0015 - 66 13 103/87 (!) 89 %   07/06/18 2345 - 73 20 122/49 93 %   07/06/18 2339 - 65 18 122/60 95 %   07/06/18 2215 - 68 17 105/50 90 %   07/06/18 2205 - 70 11 102/46 92 %   07/06/18 2154 98.8 °F (37.1 °C) - - 100/50 93 %   07/06/18 2150 - - - - 93 %   07/06/18 2145 - - - 108/55 -   07/06/18 2052 99.1 °F (37.3 °C) 72 20 101/72 93 %       Pulse Oximetry Analysis - 93% on RA    EKG interpretation: (Preliminary) 23:36  Rhythm: normal sinus rhythm; and regular . Rate (approx.): 67; Axis: normal; UT interval: normal; QRS interval: normal ; ST/T wave: normal; Other findings: normal.  Written by Sushant Culp ED Scribgenia, as dictated by Mario Mary MD.    Records Reviewed: Nursing Notes, Old Medical Records, Previous Radiology Studies and Previous Laboratory Studies    Provider Notes (Medical Decision Making):   DDx: COPD exacerbation, splenic injury, intraabdominal injury. 69 y/o female s/p fall 3 days ago with L flank pain. Vital signs stable. Exam concerning for the above. Will check screening labs, CT. Will give DuoNeb and will-reassess. ED Course:   Initial assessment performed. The patients presenting problems have been discussed, and they are in agreement with the care plan formulated and outlined with them.   I have encouraged them to ask questions as they arise throughout their visit. Medications   albuterol-ipratropium (DUO-NEB) 2.5 MG-0.5 MG/3 ML (3 mL Nebulization Given 7/6/18 2336)   methylPREDNISolone (PF) (SOLU-MEDROL) injection 125 mg (125 mg IntraVENous Given 7/6/18 2336)   morphine injection 4 mg (4 mg IntraVENous Given 7/6/18 2336)     TOBACCO COUNSELING:  Upon evaluation, pt expressed that they are a current tobacco user. For approximately 10 minutes, pt has been counseled on the dangers of smoking and was encouraged to quit as soon as possible in order to decrease further risks to their health. Pt has conveyed their understanding of the risks involved should they continue to use tobacco products. 9:32 AM  I have just reevaluated the patient. I have reviewed Her vital signs and determined there is currently no worsening in their condition or physical exam.  Results have been reviewed with them and their questions have been answered. We will continue to review further results as they come available. Progress Note:  12:07 AM  Pt states she feels much better; pain resolved; denies any nausea; no new symptoms; pt able to tolerate PO and ambulate without issues; pt clinically safe for discharge home with close PCP f/u. At time of discharge, pt had stable vitals and had no questions or concerns, and was very satisfied with overall care. Written by Amber Boykin ED Scribe, as dictated by Matthias River MD    Critical Care Time:   0 minutes. Disposition:  Discharge Note:  12:07 AM  The patient has been re-evaluated and is ready for discharge. Reviewed available results with patient. Counseled patient/parent/guardian on diagnosis and care plan. Patient has expressed understanding, and all questions have been answered. Patient agrees with plan and agrees to follow up as recommended, or return to the ED if their symptoms worsen.  Discharge instructions have been provided and explained to the patient, along with reasons to return to the ED. Written by Josh Siddiqui, ED Scribe, as dictated by Shawna Neff MD    PLAN:  1. Discharge Medication List as of 7/7/2018 12:39 AM      CONTINUE these medications which have NOT CHANGED    Details   bumetanide (BUMEX) 2 mg tablet Take 1 Tab by mouth two (2) times a day., Print, Disp-2 Tab, R-0      potassium chloride SR (K-TAB) 20 mEq tablet Take 3 Tabs by mouth two (2) times a day. Take with bumex, Print, Disp-180 Tab, R-0      magnesium oxide (MAG-OX) 400 mg tablet Take 2 Tabs by mouth two (2) times a day. Take with bumex, Print, Disp-120 Tab, R-0      glipiZIDE (GLUCOTROL) 5 mg tablet Take 1 Tab by mouth daily (with dinner). , Print, Disp-30 Tab, R-2      carvedilol (COREG) 6.25 mg tablet Take 6.25 mg by mouth two (2) times daily (with meals). , Historical Med      traMADol (ULTRAM) 50 mg tablet Take 50 mg by mouth three (3) times daily as needed (back pain). , Historical Med      nitroglycerin (NITROSTAT) 0.4 mg SL tablet by SubLINGual route every five (5) minutes as needed for Chest Pain., Historical Med      tiotropium (SPIRIVA WITH HANDIHALER) 18 mcg inhalation capsule Take 1 Cap by inhalation daily. , Historical Med      spironolactone (ALDACTONE) 25 mg tablet Take 25 mg by mouth daily. , Historical Med      SITagliptin (JANUVIA) 100 mg tablet Take 100 mg by mouth daily. , Historical Med      isosorbide mononitrate ER (IMDUR) 30 mg tablet Take 30 mg by mouth daily. , Historical Med      ferrous sulfate 325 mg (65 mg iron) tablet Take 325 mg by mouth two (2) times daily (with meals). , Historical Med      insulin glargine (LANTUS) 100 unit/mL injection 30 Units by SubCUTAneous route nightly., Historical Med      albuterol (PROVENTIL VENTOLIN) 2.5 mg /3 mL (0.083 %) nebulizer solution 3 mL by Nebulization route every four (4) hours as needed for Wheezing or Shortness of Breath., Print, Disp-24 Each, R-3      benzonatate (TESSALON) 100 mg capsule Take 1 Cap by mouth three (3) times daily as needed for Cough. , Print, Disp-30 Cap, R-0      ipratropium (ATROVENT) 0.02 % nebulizer solution 2.5 mL by Nebulization route every six (6) hours as needed for Wheezing., Print, Disp-25 mL, R-3      aspirin delayed-release 81 mg tablet Take 81 mg by mouth daily. , Historical Med      atorvastatin (LIPITOR) 80 mg tablet Take 80 mg by mouth daily. , Historical Med      budesonide-formoterol (SYMBICORT) 160-4.5 mcg/actuation HFA inhaler Take 2 Puffs by inhalation two (2) times a day., Historical Med      acetaminophen (TYLENOL) 500 mg tablet Take 500 mg by mouth every four (4) hours. Indications: BACK PAIN, Historical Med      omeprazole (PRILOSEC) 20 mg capsule Take 20 mg by mouth Before breakfast and dinner., Historical Med      gabapentin (NEURONTIN) 300 mg capsule Take 300 mg by mouth three (3) times daily. , Historical Med      montelukast (SINGULAIR) 10 mg tablet Take 10 mg by mouth daily. , Historical Med      cholecalciferol (VITAMIN D3) 1,000 unit tablet Take 1,000 Units by mouth daily. , Historical Med      clopidogrel (PLAVIX) 75 mg tablet Take 75 mg by mouth daily. , Historical Med      fluticasone (FLONASE) 50 mcg/actuation nasal spray 2 Sprays by Both Nostrils route daily. , Historical Med      sertraline (ZOLOFT) 100 mg tablet Take 100 mg by mouth daily. , Historical Med      loratadine (CLARITIN) 10 mg tablet Take 10 mg by mouth daily as needed for Allergies or Rhinitis., Historical Med      albuterol (PROVENTIL HFA, VENTOLIN HFA) 90 mcg/actuation inhaler Take 2 Puffs by inhalation every four (4) hours as needed for Wheezing or Shortness of Breath., Historical Med           2.    Follow-up Information     Follow up With Details Comments [address] [place] [address]  [phone]      [facility] EMERGENCY DEPT  As needed, If symptoms worsen [address] [place] [address]  [phone]        Return to ED if worse     Diagnosis Clinical Impression:   1. Acute exacerbation of chronic obstructive pulmonary disease (COPD) ([address]    2. Abdominal pain, generalized    3. Hematoma of left flank, initial encounter    4. Chronic anticoagulation    5. Fall, initial encounter        Attestations: This note is prepared by Keith Basilio, acting as scribe for Barbara Siemens, MD Barbara Siemens, MD: The scribe's documentation has been prepared under my direction and personally reviewed by me in its entirety. I confirm that the note above accurately reflects all work, treatment, procedures, and medical decision making performed by me. This note will not be viewable in [address]

## 2018-07-07 NOTE — ED NOTES
Pt awaiting MD eval and/or dc at this time. Pt on monitors x 3. Family bedside. Call bell within reach.

## 2018-07-07 NOTE — ED NOTES
Pt presents to ED post fall. Fall few days ago, pt states pain was \"unbearable\" today. Pt has large bruise on left flank area. Pt states pain in left flank area 10/10. Pt fell off toilet, denies LOC. Pt unsteady at this time. Pt denies chest pain. Pt bilaterally wheezing , hx of COPD, hx of MI. Pt placed on monitors x 3. Pt AOX4. Grand-daughter bedside. Bed locked and in low position. Side rails up x 2. Call bell within reach.

## 2018-07-07 NOTE — DISCHARGE INSTRUCTIONS
Thank you! Thank you for allowing us to provide you with excellent care today. We hope we addressed all of your concerns and needs. We strive to provide excellent quality care in the Emergency Department. You may receive a survey after your visit to evaluate the care you were provided. Should you receive a survey from us, we invite you to share your experience and tell us what made it excellent. It was a pleasure serving you, we invite you to share your experience with us, in our pursuit for excellence, should you be selected to receive a survey. If you feel that you have not received excellent quality care or timely care, please ask to speak to the nurse manager. Please choose us in the future for your continued health care needs. ------------------------------------------------------------------------------------------------------------  The exam and treatment you received in the Emergency Department were for an urgent problem and are not intended as complete care. It is important that you follow up with a doctor, nurse practitioner, or physician assistant for ongoing care. If your symptoms become worse or you do not improve as expected and you are unable to reach your usual health care provider, you should return to the Emergency Department. We are available 24 hours a day. Please take your discharge instructions with you when you go to your follow-up appointment. If you have any problem arranging a follow-up appointment, contact the Emergency Department immediately. If a prescription has been provided, please have it filled as soon as possible to prevent a delay in treatment. Read the entire medication instruction sheet provided to you by the pharmacy. If you have any questions or reservations about taking the medication due to side effects or interactions with other medications, please call your primary care physician or contact the ER to speak with the charge nurse.      Make an appointment with your family doctor or the physician you were referred to for follow-up of this visit as instructed on your discharge paperwork, as this is mandatory follow-up. Return to the ER if you are unable to be seen or if you are unable to be seen in a timely manner. If you have any problem arranging the follow-up visit, contact the Emergency Department immediately. Abdominal Pain: Care Instructions  Your Care Instructions    Abdominal pain has many possible causes. Some aren't serious and get better on their own in a few days. Others need more testing and treatment. If your pain continues or gets worse, you need to be rechecked and may need more tests to find out what is wrong. You may need surgery to correct the problem. Don't ignore new symptoms, such as fever, nausea and vomiting, urination problems, pain that gets worse, and dizziness. These may be signs of a more serious problem. Your doctor may have recommended a follow-up visit in the next 8 to 12 hours. If you are not getting better, you may need more tests or treatment. The doctor has checked you carefully, but problems can develop later. If you notice any problems or new symptoms, get medical treatment right away. Follow-up care is a key part of your treatment and safety. Be sure to make and go to all appointments, and call your doctor if you are having problems. It's also a good idea to know your test results and keep a list of the medicines you take. How can you care for yourself at home? · Rest until you feel better. · To prevent dehydration, drink plenty of fluids, enough so that your urine is light yellow or clear like water. Choose water and other caffeine-free clear liquids until you feel better. If you have kidney, heart, or liver disease and have to limit fluids, talk with your doctor before you increase the amount of fluids you drink.   · If your stomach is upset, eat mild foods, such as rice, dry toast or crackers, bananas, and applesauce. Try eating several small meals instead of two or three large ones. · Wait until 48 hours after all symptoms have gone away before you have spicy foods, alcohol, and drinks that contain caffeine. · Do not eat foods that are high in fat. · Avoid anti-inflammatory medicines such as aspirin, ibuprofen (Advil, Motrin), and naproxen (Aleve). These can cause stomach upset. Talk to your doctor if you take daily aspirin for another health problem. When should you call for help? Call 911 anytime you think you may need emergency care. For example, call if:  ? · You passed out (lost consciousness). ? · You pass maroon or very bloody stools. ? · You vomit blood or what looks like coffee grounds. ? · You have new, severe belly pain. ?Call your doctor now or seek immediate medical care if:  ? · Your pain gets worse, especially if it becomes focused in one area of your belly. ? · You have a new or higher fever. ? · Your stools are black and look like tar, or they have streaks of blood. ? · You have unexpected vaginal bleeding. ? · You have symptoms of a urinary tract infection. These may include:  ¨ Pain when you urinate. ¨ Urinating more often than usual.  ¨ Blood in your urine. ? · You are dizzy or lightheaded, or you feel like you may faint. ? Watch closely for changes in your health, and be sure to contact your doctor if:  ? · You are not getting better after 1 day (24 hours). Where can you learn more? Go to http://vishal-moody.info/. Enter C495 in the search box to learn more about \"Abdominal Pain: Care Instructions. \"  Current as of: March 20, 2017  Content Version: 11.4  © 4186-3536 [employer]. Care instructions adapted under license by [employer] (which disclaims liability or warranty for this information).  If you have questions about a medical condition or this instruction, always ask your healthcare professional. [employer] disclaims any warranty or liability for your use of this information. Chronic Obstructive Pulmonary Disease (COPD): Care Instructions  Your Care Instructions    Chronic obstructive pulmonary disease (COPD) is a general term for a group of lung diseases, including emphysema and chronic bronchitis. People with COPD have decreased airflow in and out of the lungs, which makes it hard to breathe. The airways also can get clogged with thick mucus. Cigarette smoking is a major cause of COPD. Although there is no cure for COPD, you can slow its progress. Following your treatment plan and taking care of yourself can help you feel better and live longer. Follow-up care is a key part of your treatment and safety. Be sure to make and go to all appointments, and call your doctor if you are having problems. It's also a good idea to know your test results and keep a list of the medicines you take. How can you care for yourself at home? ?Staying healthy  ? · Do not smoke. This is the most important step you can take to prevent more damage to your lungs. If you need help quitting, talk to your doctor about stop-smoking programs and medicines. These can increase your chances of quitting for good. ? · Avoid colds and flu. Get a pneumococcal vaccine shot. If you have had one before, ask your doctor whether you need a second dose. Get the flu vaccine every fall. If you must be around people with colds or the flu, wash your hands often. ? · Avoid secondhand smoke, air pollution, and high altitudes. Also avoid cold, dry air and hot, humid air. Stay at home with your windows closed when air pollution is bad. ?Medicines and oxygen therapy  ? · Take your medicines exactly as prescribed. Call your doctor if you think you are having a problem with your medicine. ? · You may be taking medicines such as:  ¨ Bronchodilators. These help open your airways and make breathing easier.  Bronchodilators are either short-acting (work for 6 to 9 hours) or long-acting (work for 24 hours). You inhale most bronchodilators, so they start to act quickly. Always carry your quick-relief inhaler with you in case you need it while you are away from home. ¨ Corticosteroids (prednisone, budesonide). These reduce airway inflammation. They come in pill or inhaled form. You must take these medicines every day for them to work well. ? · A spacer may help you get more inhaled medicine to your lungs. Ask your doctor or pharmacist if a spacer is right for you. If it is, ask how to use it properly. ? · Do not take any vitamins, over-the-counter medicine, or herbal products without talking to your doctor first.   ? · If your doctor prescribed antibiotics, take them as directed. Do not stop taking them just because you feel better. You need to take the full course of antibiotics. ? · Oxygen therapy boosts the amount of oxygen in your blood and helps you breathe easier. Use the flow rate your doctor has recommended, and do not change it without talking to your doctor first.   Activity  ? · Get regular exercise. Walking is an easy way to get exercise. Start out slowly, and walk a little more each day. ? · Pay attention to your breathing. You are exercising too hard if you cannot talk while you are exercising. ? · Take short rest breaks when doing household chores and other activities. ? · Learn breathing methods-such as breathing through pursed lips-to help you become less short of breath. ? · If your doctor has not set you up with a pulmonary rehabilitation program, talk to him or her about whether rehab is right for you. Rehab includes exercise programs, education about your disease and how to manage it, help with diet and other changes, and emotional support. Diet  ? · Eat regular, healthy meals. Use bronchodilators about 1 hour before you eat to make it easier to eat. Eat several small meals instead of three large ones.  Drink beverages at the end of the meal. Avoid foods that are hard to chew. ? · Eat foods that contain protein so that you do not lose muscle mass. ? · Talk with your doctor if you gain too much weight or if you lose weight without trying. ?Mental health  ? · Talk to your family, friends, or a therapist about your feelings. It is normal to feel frightened, angry, hopeless, helpless, and even guilty. Talking openly about bad feelings can help you cope. If these feelings last, talk to your doctor. When should you call for help? Call 911 anytime you think you may need emergency care. For example, call if:  ? · You have severe trouble breathing. ?Call your doctor now or seek immediate medical care if:  ? · You have new or worse trouble breathing. ? · You cough up blood. ? · You have a fever. ? Watch closely for changes in your health, and be sure to contact your doctor if:  ? · You cough more deeply or more often, especially if you notice more mucus or a change in the color of your mucus. ? · You have new or worse swelling in your legs or belly. ? · You are not getting better as expected. Where can you learn more? Go to http://vishal-moody.info/. Ranjit Diaz in the search box to learn more about \"Chronic Obstructive Pulmonary Disease (COPD): Care Instructions. \"  Current as of: May 12, 2017  Content Version: 11.4  © 3477-6167 [employer]. Care instructions adapted under license by [employer] (which disclaims liability or warranty for this information). If you have questions about a medical condition or this instruction, always ask your healthcare professional. [address] any warranty or liability for your use of this information.

## 2019-06-12 ENCOUNTER — APPOINTMENT (OUTPATIENT)
Dept: GENERAL RADIOLOGY | Age: 69
DRG: 683 | End: 2019-06-12
Attending: EMERGENCY MEDICINE
Payer: MEDICARE

## 2019-06-12 ENCOUNTER — APPOINTMENT (OUTPATIENT)
Dept: CT IMAGING | Age: 69
DRG: 683 | End: 2019-06-12
Attending: EMERGENCY MEDICINE
Payer: MEDICARE

## 2019-06-12 ENCOUNTER — HOSPITAL ENCOUNTER (EMERGENCY)
Age: 69
Discharge: HOME | DRG: 683 | End: 2019-06-12
Attending: EMERGENCY MEDICINE
Payer: MEDICARE

## 2019-06-12 VITALS
OXYGEN SATURATION: 98 % | HEART RATE: 91 BPM | HEIGHT: 60 IN | RESPIRATION RATE: 16 BRPM | DIASTOLIC BLOOD PRESSURE: 65 MMHG | TEMPERATURE: 99.2 F | WEIGHT: 220 LBS | BODY MASS INDEX: 43.19 KG/M2 | SYSTOLIC BLOOD PRESSURE: 123 MMHG

## 2019-06-12 DIAGNOSIS — E87.6 HYPOKALEMIA: Primary | ICD-10-CM
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
## 2019-06-12 DIAGNOSIS — R53.1 GENERAL WEAKNESS: ICD-10-CM

## 2019-06-12 LAB
ALBUMIN SERPL-MCNC: 4.2 G/DL (ref 3.5–5)
ALBUMIN/GLOB SERPL: 1 {RATIO} (ref 1.1–2.2)
ALP SERPL-CCNC: 82 U/L (ref 45–117)
ALT SERPL-CCNC: 70 U/L (ref 12–78)
AMORPH CRY URNS QL MICRO: ABNORMAL
ANION GAP SERPL CALC-SCNC: 8 MMOL/L (ref 5–15)
APPEARANCE UR: CLEAR
AST SERPL-CCNC: 114 U/L (ref 15–37)
ATRIAL RATE: 96 BPM
BACTERIA URNS QL MICRO: NEGATIVE /HPF
BASOPHILS # BLD: 0 K/UL (ref 0–0.1)
BASOPHILS NFR BLD: 0 % (ref 0–1)
BILIRUB SERPL-MCNC: 0.7 MG/DL (ref 0.2–1)
BILIRUB UR QL CFM: NEGATIVE
BUN SERPL-MCNC: 51 MG/DL (ref 6–20)
BUN/CREAT SERPL: 29 (ref 12–20)
CALCIUM SERPL-MCNC: 9.9 MG/DL (ref 8.5–10.1)
CALCULATED P AXIS, ECG09: 49 DEGREES
CALCULATED R AXIS, ECG10: 5 DEGREES
CALCULATED T AXIS, ECG11: 29 DEGREES
CHLORIDE SERPL-SCNC: 101 MMOL/L (ref 97–108)
CO2 SERPL-SCNC: 37 MMOL/L (ref 21–32)
COLOR UR: ABNORMAL
CREAT SERPL-MCNC: 1.76 MG/DL (ref 0.55–1.02)
DIAGNOSIS, 93000: NORMAL
DIFFERENTIAL METHOD BLD: ABNORMAL
EOSINOPHIL # BLD: 0.1 K/UL (ref 0–0.4)
EOSINOPHIL NFR BLD: 1 % (ref 0–7)
EPITH CASTS URNS QL MICRO: ABNORMAL /LPF
ERYTHROCYTE [DISTWIDTH] IN BLOOD BY AUTOMATED COUNT: 14.4 % (ref 11.5–14.5)
GLOBULIN SER CALC-MCNC: 4.4 G/DL (ref 2–4)
GLUCOSE SERPL-MCNC: 287 MG/DL (ref 65–100)
GLUCOSE UR STRIP.AUTO-MCNC: NEGATIVE MG/DL
HCT VFR BLD AUTO: 39.9 % (ref 35–47)
HGB BLD-MCNC: 11.6 G/DL (ref 11.5–16)
HGB UR QL STRIP: NEGATIVE
HYALINE CASTS URNS QL MICRO: ABNORMAL /LPF (ref 0–5)
IMM GRANULOCYTES # BLD AUTO: 0.1 K/UL (ref 0–0.04)
IMM GRANULOCYTES NFR BLD AUTO: 1 % (ref 0–0.5)
KETONES UR QL STRIP.AUTO: ABNORMAL MG/DL
LACTATE BLD-SCNC: 1.23 MMOL/L (ref 0.4–2)
LEUKOCYTE ESTERASE UR QL STRIP.AUTO: NEGATIVE
LYMPHOCYTES # BLD: 0.8 K/UL (ref 0.8–3.5)
LYMPHOCYTES NFR BLD: 9 % (ref 12–49)
MCH RBC QN AUTO: 26.7 PG (ref 26–34)
MCHC RBC AUTO-ENTMCNC: 29.1 G/DL (ref 30–36.5)
MCV RBC AUTO: 91.9 FL (ref 80–99)
MONOCYTES # BLD: 0.6 K/UL (ref 0–1)
MONOCYTES NFR BLD: 7 % (ref 5–13)
MUCOUS THREADS URNS QL MICRO: ABNORMAL /LPF
NEUTS SEG # BLD: 7.3 K/UL (ref 1.8–8)
NEUTS SEG NFR BLD: 82 % (ref 32–75)
NITRITE UR QL STRIP.AUTO: NEGATIVE
NRBC # BLD: 0 K/UL (ref 0–0.01)
NRBC BLD-RTO: 0 PER 100 WBC
P-R INTERVAL, ECG05: 108 MS
PH UR STRIP: 5.5 [PH] (ref 5–8)
PLATELET # BLD AUTO: 272 K/UL (ref 150–400)
PMV BLD AUTO: 11.2 FL (ref 8.9–12.9)
POTASSIUM SERPL-SCNC: 3.1 MMOL/L (ref 3.5–5.1)
PROT SERPL-MCNC: 8.6 G/DL (ref 6.4–8.2)
PROT UR STRIP-MCNC: NEGATIVE MG/DL
Q-T INTERVAL, ECG07: 394 MS
QRS DURATION, ECG06: 108 MS
QTC CALCULATION (BEZET), ECG08: 497 MS
RBC # BLD AUTO: 4.34 M/UL (ref 3.8–5.2)
RBC #/AREA URNS HPF: ABNORMAL /HPF (ref 0–5)
RBC MORPH BLD: ABNORMAL
SODIUM SERPL-SCNC: 146 MMOL/L (ref 136–145)
SP GR UR REFRACTOMETRY: 1.02 (ref 1–1.03)
UA: UC IF INDICATED,UAUC: ABNORMAL
UROBILINOGEN UR QL STRIP.AUTO: 1 EU/DL (ref 0.2–1)
VENTRICULAR RATE, ECG03: 96 BPM
WBC # BLD AUTO: 8.9 K/UL (ref 3.6–11)
WBC URNS QL MICRO: ABNORMAL /HPF (ref 0–4)

## 2019-06-12 PROCEDURE — 36415 COLL VENOUS BLD VENIPUNCTURE: CPT

## 2019-06-12 PROCEDURE — 70450 CT HEAD/BRAIN W/O DYE: CPT

## 2019-06-12 PROCEDURE — 80053 COMPREHEN METABOLIC PANEL: CPT

## 2019-06-12 PROCEDURE — 87040 BLOOD CULTURE FOR BACTERIA: CPT

## 2019-06-12 PROCEDURE — 77010033678 HC OXYGEN DAILY

## 2019-06-12 PROCEDURE — 85025 COMPLETE CBC W/AUTO DIFF WBC: CPT

## 2019-06-12 PROCEDURE — 93005 ELECTROCARDIOGRAM TRACING: CPT

## 2019-06-12 PROCEDURE — 74011250637 HC RX REV CODE- 250/637: Performed by: EMERGENCY MEDICINE

## 2019-06-12 PROCEDURE — 83605 ASSAY OF LACTIC ACID: CPT

## 2019-06-12 PROCEDURE — 81001 URINALYSIS AUTO W/SCOPE: CPT

## 2019-06-12 PROCEDURE — 71045 X-RAY EXAM CHEST 1 VIEW: CPT

## 2019-06-12 PROCEDURE — 99285 EMERGENCY DEPT VISIT HI MDM: CPT

## 2019-06-12 RX ORDER — POTASSIUM CHLORIDE 1.5 G/1.77G
20 POWDER, FOR SOLUTION ORAL 2 TIMES DAILY WITH MEALS
Status: DISCONTINUED | OUTPATIENT
Start: 2019-06-12 | End: 2019-06-12

## 2019-06-12 RX ORDER — POTASSIUM CHLORIDE 1.5 G/1.77G
20 POWDER, FOR SOLUTION ORAL
Status: COMPLETED | OUTPATIENT
Start: 2019-06-12 | End: 2019-06-12

## 2019-06-12 RX ADMIN — POTASSIUM CHLORIDE 20 MEQ: 1.5 POWDER, FOR SOLUTION ORAL at 13:45

## 2019-06-12 NOTE — ED NOTES
Pt discharged by Dr. Claudio Burnette. Pt provided with discharge instructions Rx and instructions on follow up care. Pt out of ED in stable condition accompanied by ALYSA.

## 2019-06-12 NOTE — ED NOTES
Assumed care of patient at this time via EMS. Per EMS patient had an unwitnessed fall at her assisted living facility. Patient unsure if she was dizzy and fell or if she slipped and fell. Pt denies hitting her head. Pt on the ground for about an hour before she was found.  Patient alert and oriented and able to answer all questions appropriately

## 2019-06-12 NOTE — ED PROVIDER NOTES
EMERGENCY DEPARTMENT HISTORY AND PHYSICAL EXAM      Date: 6/12/2019  Patient Name: Donell Childs    Please note that this dictation was completed with [employer], the computer voice recognition software. Quite often unanticipated grammatical, syntax, homophones, and other interpretive errors are inadvertently transcribed by the computer software. Please disregard these errors. Please excuse any errors that have escaped final proofreading. History of Presenting Illness     Chief Complaint   Patient presents with    Fall     Pt had an unwitnessed fall at home. Pt unsure if mechanical or due to syncope. Denies hitting her head. complains of left shoulder pain at this time       History Provided By: Patient    HPI: Donell Childs, 78 y.o. female, presented the emergency department with a fall out of bed. Patient states she slid out of bed. Did not hit her head, did not lose consciousness. Denies any complaints at this time. Septic work-up initially started out front as the patient was tachypneic and slightly tachycardic. She is afebrile, no leukocytosis. No obvious source of infection here. She has no complaints for infection. PCP: Rosi Elizondo, DO    No current facility-administered medications on file prior to encounter. Current Outpatient Medications on File Prior to Encounter   Medication Sig Dispense Refill    oxyCODONE-acetaminophen (PERCOCET) 5-325 mg per tablet Take 1 Tab by mouth every four (4) hours as needed for Pain. Max Daily Amount: 6 Tabs. 10 Tab 0    bumetanide (BUMEX) 2 mg tablet Take 1 Tab by mouth two (2) times a day. 2 Tab 0    potassium chloride SR (K-TAB) 20 mEq tablet Take 3 Tabs by mouth two (2) times a day. Take with bumex 180 Tab 0    magnesium oxide (MAG-OX) 400 mg tablet Take 2 Tabs by mouth two (2) times a day. Take with bumex 120 Tab 0    glipiZIDE (GLUCOTROL) 5 mg tablet Take 1 Tab by mouth daily (with dinner).  30 Tab 2    carvedilol (COREG) 6.25 mg tablet Take 6.25 mg by mouth two (2) times daily (with meals).  traMADol (ULTRAM) 50 mg tablet Take 50 mg by mouth three (3) times daily as needed (back pain).  nitroglycerin (NITROSTAT) 0.4 mg SL tablet by SubLINGual route every five (5) minutes as needed for Chest Pain.  tiotropium (SPIRIVA WITH HANDIHALER) 18 mcg inhalation capsule Take 1 Cap by inhalation daily.  spironolactone (ALDACTONE) 25 mg tablet Take 25 mg by mouth daily.  SITagliptin (JANUVIA) 100 mg tablet Take 100 mg by mouth daily.  isosorbide mononitrate ER (IMDUR) 30 mg tablet Take 30 mg by mouth daily.  ferrous sulfate 325 mg (65 mg iron) tablet Take 325 mg by mouth two (2) times daily (with meals).  insulin glargine (LANTUS) 100 unit/mL injection 30 Units by SubCUTAneous route nightly.  albuterol (PROVENTIL VENTOLIN) 2.5 mg /3 mL (0.083 %) nebulizer solution 3 mL by Nebulization route every four (4) hours as needed for Wheezing or Shortness of Breath. 24 Each 3    benzonatate (TESSALON) 100 mg capsule Take 1 Cap by mouth three (3) times daily as needed for Cough. 30 Cap 0    ipratropium (ATROVENT) 0.02 % nebulizer solution 2.5 mL by Nebulization route every six (6) hours as needed for Wheezing. 25 mL 3    aspirin delayed-release 81 mg tablet Take 81 mg by mouth daily.  atorvastatin (LIPITOR) 80 mg tablet Take 80 mg by mouth daily.  budesonide-formoterol (SYMBICORT) 160-4.5 mcg/actuation HFA inhaler Take 2 Puffs by inhalation two (2) times a day.  acetaminophen (TYLENOL) 500 mg tablet Take 500 mg by mouth every four (4) hours. Indications: BACK PAIN      omeprazole (PRILOSEC) 20 mg capsule Take 20 mg by mouth Before breakfast and dinner.  gabapentin (NEURONTIN) 300 mg capsule Take 300 mg by mouth three (3) times daily.  montelukast (SINGULAIR) 10 mg tablet Take 10 mg by mouth daily.       cholecalciferol (VITAMIN D3) 1,000 unit tablet Take 1,000 Units by mouth daily.      clopidogrel (PLAVIX) 75 mg tablet Take 75 mg by mouth daily.  fluticasone (FLONASE) 50 mcg/actuation nasal spray 2 Sprays by Both Nostrils route daily.  sertraline (ZOLOFT) 100 mg tablet Take 100 mg by mouth daily.  loratadine (CLARITIN) 10 mg tablet Take 10 mg by mouth daily as needed for Allergies or Rhinitis.  albuterol (PROVENTIL HFA, VENTOLIN HFA) 90 mcg/actuation inhaler Take 2 Puffs by inhalation every four (4) hours as needed for Wheezing or Shortness of Breath. Past History     Past Medical History:  Past Medical History:   Diagnosis Date    Asthma     CAD (coronary artery disease)     Chronic kidney disease     Chronic obstructive pulmonary disease ([facility] [address]     H/O acute myocardial infarction     Heart failure (HCC)     Hypertension        Past Surgical History:  Past Surgical History:   Procedure Laterality Date    HX CHOLECYSTECTOMY      gall stones removed    HX GYN         Family History:  No family history on file. Social History:  Social History     Tobacco Use    Smoking status: Current Every Day Smoker    Smokeless tobacco: Never Used   Substance Use Topics    Alcohol use: No    Drug use: No       Allergies: Allergies   Allergen Reactions    Garlic Shortness of Breath    Metformin Nausea and Vomiting    Pcn [Penicillins] Hives         Review of Systems   Review of Systems   Constitutional: Negative for chills and fever. HENT: Negative for congestion and sore throat. Eyes: Negative for visual disturbance. Respiratory: Negative for cough and shortness of breath. Cardiovascular: Negative for chest pain and leg swelling. Gastrointestinal: Negative for abdominal pain, blood in stool, diarrhea and nausea. Endocrine: Negative for polyuria. Genitourinary: Negative for dysuria, flank pain, vaginal bleeding and vaginal discharge. Musculoskeletal: Negative for myalgias. Skin: Negative for rash.    Allergic/Immunologic: Negative for immunocompromised state. Neurological: Negative for weakness and headaches. Psychiatric/Behavioral: Negative for confusion. Physical Exam   Physical Exam   Constitutional: oriented to person, place, and time. appears well-developed and well-nourished. HENT:   Head: Normocephalic and atraumatic. Moist mucous membranes   Eyes: Pupils are equal, round, and reactive to light. Conjunctivae are normal. Right eye exhibits no discharge. Left eye exhibits no discharge. Neck: Normal range of motion. Neck supple. No tracheal deviation present. Cardiovascular: Normal rate, regular rhythm and normal heart sounds. No murmur heard. Pulmonary/Chest: Effort normal and breath sounds normal. No respiratory distress. no wheezes. no rales. Abdominal: Soft. Bowel sounds are normal. There is no tenderness. There is no rebound and no guarding. Musculoskeletal: Normal range of motion. exhibits no edema, tenderness or deformity. Neurological: alert and oriented to person, place, and time. Skin: Skin is warm and dry. No rash noted. No erythema. Psychiatric: behavior is normal.   Nursing note and vitals reviewed. Diagnostic Study Results     Labs -     No results found for this or any previous visit (from the past 12 hour(s)). Radiologic Studies -   CT HEAD WO CONT   Final Result   IMPRESSION: No acute intracranial abnormality. XR CHEST PORT   Final Result   IMPRESSION: No acute abnormality. CT Results  (Last 48 hours)               06/12/19 1318  CT HEAD WO CONT Final result    Impression:  IMPRESSION: No acute intracranial abnormality. Narrative:  EXAM:  CT HEAD WITHOUT CONTRAST   INDICATION: Head trauma, closed, mild, GCS >= 13, no risk factors, neuro exam   normal.   COMPARISON: 1/4/2018. CONTRAST: None. TECHNIQUE: Unenhanced CT of the head was performed using 5 mm images. Brain and   bone windows were generated. Sagittal and coronal reformations were generated. CT dose reduction was achieved through use of a standardized protocol tailored   for this examination and automatic exposure control for dose modulation. FINDINGS:   The ventricles and sulci are normal in size, shape and configuration and   midline. There is carotid artery calcification. There is decreased attenuation in the   periventricular white matter adjacent to the frontal horn of the right lateral   ventricle which is unchanged. There is no intracranial hemorrhage. There is no extra-axial collection, mass, mass effect or midline shift. The basilar cisterns are open. No acute infarct is identified. The bone windows demonstrate no abnormalities. The visualized portions of the paranasal sinuses and mastoid air cells are   clear. CXR Results  (Last 48 hours)               06/12/19 1155  XR CHEST PORT Final result    Impression:  IMPRESSION: No acute abnormality. Narrative:  EXAM:  XR CHEST PORT. INDICATION: Code purple. COMPARISON: 7/6/2018. FINDINGS:    A portable AP radiograph of the chest was obtained at 1139 hours. Lines and tubes: The patient is on a cardiac monitor and nasal oxygen. Lungs: The lungs are clear. Pleura: There is no pneumothorax or pleural effusion. Mediastinum: The cardiac and mediastinal contours and pulmonary vascularity are   normal.   Bones and soft tissues: The bones and soft tissues are grossly within normal   limits. Medical Decision Making   I am the first provider for this patient. I reviewed the vital signs, available nursing notes, past medical history, past surgical history, family history and social history. Vital Signs-Reviewed the patient's vital signs. No data found. Records Reviewed: Nursing Notes and Old Medical Records    Provider Notes (Medical Decision Making):   Patient here with a fall. Syncope unlikely. Doubt acute injury. Labs reassuring, patient non toxic. Case management consulted. ED Course:   Initial assessment performed. The patients presenting problems have been discussed, and they are in agreement with the care plan formulated and outlined with them. I have encouraged them to ask questions as they arise throughout their visit. Critical Care Time:   none    Disposition:  Will discharge to home now in stable condition. Patient will return with worsening pain. Close return precautions given, follow up advised as written. Patient voiced understanding and questions were answered at bedside. PLAN:  1. Discharge Medication List as of 6/12/2019  2:30 PM        2. Follow-up Information     Follow up With Specialties Details Why Contact Info    Reanna De Luna DO Family [facility] Schedule an appointment as soon as possible for a visit  [address] [facility] [address] [address]  [phone]      [facility] EMERGENCY DEPT Emergency Medicine  If symptoms worsen [address] [facility] [address]   [address]    [facility] Neurology Schedule an appointment as soon as possible for a visit Regarding multiple falls and progressive decline in mental status [address] [facility]  [address] [facility]  [phone]          Return to ED if worse     Diagnosis     Clinical Impression:   1. Hypokalemia    2. Fall, initial encounter    3. General weakness        Attestations:   This note was completed by Jonathan Spring DO

## 2019-06-12 NOTE — PROGRESS NOTES
CM asked to see patient regarding possible need for resources at discharge. CM visited pt and family in room, introduced self and role. Pt and family verbalized understanding. CM verified demographic information with pt at bedside. CM will add daughters to emergency contact list per pt request.     Pt lives alone in a second floor apt for seniors with an elevator for transport. Pt uses home Oxygen, 2 liters via nasal cannula at home. Pt nor family can recall oxygen supplier. CM did encourage family to find out O2 supplier and keep for their records in case of emergent needs. Pt stated she has recently been enrolled in the [facility] program (approx 2 weeks). CM encouraged family to reach out to [facility] for any concerns or needs within the program.   CM encouraged pt and family to reach out to community  with any concerns regarding [employer]. Family very supportive. CM to remain available for support as needed. Cheryle Rilee.  RN, BSN  [address] [facility]  [phone]

## 2019-06-12 NOTE — DISCHARGE INSTRUCTIONS
Patient Education        Preventing Falls: Care Instructions  Your Care Instructions    Getting around your home safely can be a challenge if you have injuries or health problems that make it easy for you to fall. Loose rugs and furniture in walkways are among the dangers for many older people who have problems walking or who have poor eyesight. People who have conditions such as arthritis, osteoporosis, or dementia also have to be careful not to fall. You can make your home safer with a few simple measures. Follow-up care is a key part of your treatment and safety. Be sure to make and go to all appointments, and call your doctor if you are having problems. It's also a good idea to know your test results and keep a list of the medicines you take. How can you care for yourself at home? Taking care of yourself  · You may get dizzy if you do not drink enough water. To prevent dehydration, drink plenty of fluids, enough so that your urine is light yellow or clear like water. Choose water and other caffeine-free clear liquids. If you have kidney, heart, or liver disease and have to limit fluids, talk with your doctor before you increase the amount of fluids you drink. · Exercise regularly to improve your strength, muscle tone, and balance. Walk if you can. Swimming may be a good choice if you cannot walk easily. · Have your vision and hearing checked each year or any time you notice a change. If you have trouble seeing and hearing, you might not be able to avoid objects and could lose your balance. · Know the side effects of the medicines you take. Ask your doctor or pharmacist whether the medicines you take can affect your balance. Sleeping pills or sedatives can affect your balance. · Limit the amount of alcohol you drink. Alcohol can impair your balance and other senses. · Ask your doctor whether calluses or corns on your feet need to be removed.  If you wear loose-fitting shoes because of calluses or corns, you can lose your balance and fall. · Talk to your doctor if you have numbness in your feet. Preventing falls at home  · Remove raised doorway thresholds, throw rugs, and clutter. Repair loose carpet or raised areas in the floor. · Move furniture and electrical cords to keep them out of walking paths. · Use nonskid floor wax, and wipe up spills right away, especially on ceramic tile floors. · If you use a walker or cane, put rubber tips on it. If you use crutches, clean the bottoms of them regularly with an abrasive pad, such as steel wool. · Keep your house well lit, especially [employer], and outside walkways. Use night-lights in areas such as hallways and bathrooms. Add extra light switches or use remote switches (such as switches that go on or off when you clap your hands) to make it easier to turn lights on if you have to get up during the night. · Install sturdy handrails on stairways. · Move items in your cabinets so that the things you use a lot are on the lower shelves (about waist level). · Keep a cordless phone and a flashlight with new batteries by your bed. If possible, put a phone in each of the main rooms of your house, or carry a cell phone in case you fall and cannot reach a phone. Or, you can wear a device around your neck or wrist. You push a button that sends a signal for help. · Wear low-heeled shoes that fit well and give your feet good support. Use footwear with nonskid soles. Check the heels and soles of your shoes for wear. Repair or replace worn heels or soles. · Do not wear socks without shoes on wood floors. · Walk on the grass when the sidewalks are slippery. If you live in an area that gets snow and ice in the winter, sprinkle salt on slippery steps and sidewalks. Preventing falls in the bath  · Install grab bars and nonskid mats inside and outside your shower or tub and near the toilet and sinks. · Use shower chairs and bath benches.   · Use a hand-held shower head that will allow you to sit while showering. · Get into a tub or shower by putting the weaker leg in first. Get out of a tub or shower with your strong side first.  · Repair loose toilet seats and consider installing a raised toilet seat to make getting on and off the toilet easier. · Keep your bathroom door unlocked while you are in the shower. Where can you learn more? Go to http://vishal-moody.info/. Enter 0476 [id number] 69 [id number] in the search box to learn more about \"Preventing Falls: Care Instructions. \"  Current as of: March 15, 2018  Content Version: 11.9  © 5544-6896 [employer]. Care instructions adapted under license by [employer] (which disclaims liability or warranty for this information). If you have questions about a medical condition or this instruction, always ask your healthcare professional. [place] [address] any warranty or liability for your use of this information. Patient Education        Hypokalemia: Care Instructions  Your Care Instructions    Hypokalemia (say \"eb-wz-jkl-RENEA-adriana-uh\") is a low level of potassium. The heart, muscles, kidneys, and nervous system all need potassium to work well. This problem has many different causes. Kidney problems, diet, and medicines like diuretics and laxatives can cause it. So can vomiting or diarrhea. In some cases, cancer is the cause. Your doctor may do tests to find the cause of your low potassium levels. You may need medicines to bring your potassium levels back to normal. You may also need regular blood tests to check your potassium. If you have very low potassium, you may need intravenous (IV) medicines. You also may need tests to check the electrical activity of your heart. Heart problems caused by low potassium levels can be very serious. Follow-up care is a key part of your treatment and safety. Be sure to make and go to all appointments, and call your doctor if you are having problems.  It's also a good idea to know your test results and keep a list of the medicines you take. How can you care for yourself at home? · If your doctor recommends it, eat foods that have a lot of potassium. These include fresh fruits, juices, and vegetables. They also include nuts, beans, and milk. · Be safe with medicines. If your doctor prescribes medicines or potassium supplements, take them exactly as directed. Call your doctor if you have any problems with your medicines. · Get your potassium levels tested as often as your doctor tells you. When should you call for help? Call 911 anytime you think you may need emergency care. For example, call if:    · You feel like your heart is missing beats. Heart problems caused by low potassium can cause death.     · You passed out (lost consciousness).     · You have a seizure.    Call your doctor now or seek immediate medical care if:    · You feel weak or unusually tired.     · You have severe arm or leg cramps.     · You have tingling or numbness.     · You feel sick to your stomach, or you vomit.     · You have belly cramps.     · You feel bloated or constipated.     · You have to urinate a lot.     · You feel very thirsty most of the time.     · You are dizzy or lightheaded, or you feel like you may faint.     · You feel depressed, or you lose touch with reality.    Watch closely for changes in your health, and be sure to contact your doctor if:    · You do not get better as expected. Where can you learn more? Go to http://vishal-moody.info/. Enter G358 in the search box to learn more about \"Hypokalemia: Care Instructions. \"  Current as of: March 14, 2018  Content Version: 11.9  © 3708-5705 [employer]. Care instructions adapted under license by [employer] (which disclaims liability or warranty for this information).  If you have questions about a medical condition or this instruction, always ask your healthcare professional. [employer] disclaims any warranty or liability for your use of this information.

## 2019-06-12 NOTE — ED NOTES
[facility] at bedside to transport patient to assisted living facility.  Patient dressed in paper scrubs for transport

## 2019-06-14 NOTE — PROGRESS NOTES
Pt discharged without antibiotics. Only growing in 1 of 4 bottles to date. Suspect contaminant.   Final results and sensitivities pending

## 2019-06-15 ENCOUNTER — APPOINTMENT (OUTPATIENT)
Dept: ULTRASOUND IMAGING | Age: 69
DRG: 683 | End: 2019-06-15
Attending: EMERGENCY MEDICINE
Payer: MEDICARE

## 2019-06-15 ENCOUNTER — HOSPITAL ENCOUNTER (INPATIENT)
Age: 69
LOS: 2 days | Discharge: HOME | DRG: 683 | End: 2019-06-17
Attending: EMERGENCY MEDICINE | Admitting: HOSPITALIST
Payer: MEDICARE

## 2019-06-15 ENCOUNTER — APPOINTMENT (OUTPATIENT)
Dept: CT IMAGING | Age: 69
DRG: 683 | End: 2019-06-15
Attending: EMERGENCY MEDICINE
Payer: MEDICARE

## 2019-06-15 ENCOUNTER — APPOINTMENT (OUTPATIENT)
Dept: GENERAL RADIOLOGY | Age: 69
DRG: 683 | End: 2019-06-15
Attending: EMERGENCY MEDICINE
Payer: MEDICARE

## 2019-06-15 DIAGNOSIS — J96.12 CHRONIC RESPIRATORY FAILURE WITH HYPERCAPNIA (HCC): ICD-10-CM

## 2019-06-15 DIAGNOSIS — R29.6 RECURRENT FALLS: ICD-10-CM

## 2019-06-15 DIAGNOSIS — N17.9 AKI (ACUTE KIDNEY INJURY) (HCC): Primary | ICD-10-CM

## 2019-06-15 DIAGNOSIS — E87.6 HYPOKALEMIA: ICD-10-CM

## 2019-06-15 LAB
ALBUMIN SERPL-MCNC: 3.9 G/DL (ref 3.5–5)
ALBUMIN/GLOB SERPL: 0.9 {RATIO} (ref 1.1–2.2)
ALP SERPL-CCNC: 85 U/L (ref 45–117)
ALT SERPL-CCNC: 63 U/L (ref 12–78)
ANION GAP SERPL CALC-SCNC: 8 MMOL/L (ref 5–15)
APPEARANCE UR: ABNORMAL
ARTERIAL PATENCY WRIST A: ABNORMAL
AST SERPL-CCNC: 59 U/L (ref 15–37)
BACTERIA URNS QL MICRO: ABNORMAL /HPF
BASE EXCESS BLD CALC-SCNC: 7 MMOL/L
BASOPHILS # BLD: 0 K/UL (ref 0–0.1)
BASOPHILS NFR BLD: 1 % (ref 0–1)
BDY SITE: ABNORMAL
BILIRUB SERPL-MCNC: 0.6 MG/DL (ref 0.2–1)
BILIRUB UR QL: NEGATIVE
BNP SERPL-MCNC: 235 PG/ML
BUN SERPL-MCNC: 74 MG/DL (ref 6–20)
BUN/CREAT SERPL: 28 (ref 12–20)
CALCIUM SERPL-MCNC: 8.9 MG/DL (ref 8.5–10.1)
CHLORIDE SERPL-SCNC: 92 MMOL/L (ref 97–108)
CK SERPL-CCNC: 311 U/L (ref 26–192)
CO2 SERPL-SCNC: 34 MMOL/L (ref 21–32)
COLOR UR: ABNORMAL
CREAT SERPL-MCNC: 2.65 MG/DL (ref 0.55–1.02)
DIFFERENTIAL METHOD BLD: ABNORMAL
EOSINOPHIL # BLD: 0.4 K/UL (ref 0–0.4)
EOSINOPHIL NFR BLD: 5 % (ref 0–7)
EPITH CASTS URNS QL MICRO: ABNORMAL /LPF
ERYTHROCYTE [DISTWIDTH] IN BLOOD BY AUTOMATED COUNT: 14.3 % (ref 11.5–14.5)
GAS FLOW.O2 O2 DELIVERY SYS: ABNORMAL L/MIN
GAS FLOW.O2 SETTING OXYMISER: 2 L/M
GLOBULIN SER CALC-MCNC: 4.3 G/DL (ref 2–4)
GLUCOSE BLD STRIP.AUTO-MCNC: 225 MG/DL (ref 65–100)
GLUCOSE BLD STRIP.AUTO-MCNC: 229 MG/DL (ref 65–100)
GLUCOSE SERPL-MCNC: 248 MG/DL (ref 65–100)
GLUCOSE UR STRIP.AUTO-MCNC: NEGATIVE MG/DL
HCO3 BLD-SCNC: 32.5 MMOL/L (ref 22–26)
HCT VFR BLD AUTO: 38.3 % (ref 35–47)
HGB BLD-MCNC: 11.4 G/DL (ref 11.5–16)
HGB UR QL STRIP: NEGATIVE
HYALINE CASTS URNS QL MICRO: ABNORMAL /LPF (ref 0–5)
IMM GRANULOCYTES # BLD AUTO: 0 K/UL (ref 0–0.04)
IMM GRANULOCYTES NFR BLD AUTO: 1 % (ref 0–0.5)
INR PPP: 1 (ref 0.9–1.1)
KETONES UR QL STRIP.AUTO: NEGATIVE MG/DL
LEUKOCYTE ESTERASE UR QL STRIP.AUTO: ABNORMAL
LYMPHOCYTES # BLD: 1 K/UL (ref 0.8–3.5)
LYMPHOCYTES NFR BLD: 12 % (ref 12–49)
MAGNESIUM SERPL-MCNC: 2.2 MG/DL (ref 1.6–2.4)
MCH RBC QN AUTO: 26.8 PG (ref 26–34)
MCHC RBC AUTO-ENTMCNC: 29.8 G/DL (ref 30–36.5)
MCV RBC AUTO: 90.1 FL (ref 80–99)
MONOCYTES # BLD: 0.6 K/UL (ref 0–1)
MONOCYTES NFR BLD: 7 % (ref 5–13)
NEUTS SEG # BLD: 5.9 K/UL (ref 1.8–8)
NEUTS SEG NFR BLD: 74 % (ref 32–75)
NITRITE UR QL STRIP.AUTO: NEGATIVE
NRBC # BLD: 0 K/UL (ref 0–0.01)
NRBC BLD-RTO: 0 PER 100 WBC
PCO2 BLD: 61.6 MMHG (ref 35–45)
PH BLD: 7.33 [PH] (ref 7.35–7.45)
PH UR STRIP: 6 [PH] (ref 5–8)
PHOSPHATE SERPL-MCNC: 5.5 MG/DL (ref 2.6–4.7)
PLATELET # BLD AUTO: 199 K/UL (ref 150–400)
PMV BLD AUTO: 11.6 FL (ref 8.9–12.9)
PO2 BLD: 125 MMHG (ref 80–100)
POTASSIUM SERPL-SCNC: 3.3 MMOL/L (ref 3.5–5.1)
PROT SERPL-MCNC: 8.2 G/DL (ref 6.4–8.2)
PROT UR STRIP-MCNC: NEGATIVE MG/DL
PROTHROMBIN TIME: 10.3 SEC (ref 9–11.1)
RBC # BLD AUTO: 4.25 M/UL (ref 3.8–5.2)
RBC #/AREA URNS HPF: ABNORMAL /HPF (ref 0–5)
SAO2 % BLD: 98 % (ref 92–97)
SERVICE CMNT-IMP: ABNORMAL
SERVICE CMNT-IMP: ABNORMAL
SODIUM SERPL-SCNC: 134 MMOL/L (ref 136–145)
SP GR UR REFRACTOMETRY: 1.01 (ref 1–1.03)
SPECIMEN TYPE: ABNORMAL
TOTAL RESP. RATE, ITRR: 18
TROPONIN I SERPL-MCNC: <0.05 NG/ML
UA: UC IF INDICATED,UAUC: ABNORMAL
UROBILINOGEN UR QL STRIP.AUTO: 0.2 EU/DL (ref 0.2–1)
WBC # BLD AUTO: 7.9 K/UL (ref 3.6–11)
WBC URNS QL MICRO: ABNORMAL /HPF (ref 0–4)

## 2019-06-15 PROCEDURE — 81001 URINALYSIS AUTO W/SCOPE: CPT

## 2019-06-15 PROCEDURE — 71045 X-RAY EXAM CHEST 1 VIEW: CPT

## 2019-06-15 PROCEDURE — 77030012879 HC MSK CPAP FLL FAC PHIL -B

## 2019-06-15 PROCEDURE — 36415 COLL VENOUS BLD VENIPUNCTURE: CPT

## 2019-06-15 PROCEDURE — 99285 EMERGENCY DEPT VISIT HI MDM: CPT

## 2019-06-15 PROCEDURE — 74011250636 HC RX REV CODE- 250/636: Performed by: HOSPITALIST

## 2019-06-15 PROCEDURE — 83735 ASSAY OF MAGNESIUM: CPT

## 2019-06-15 PROCEDURE — 84100 ASSAY OF PHOSPHORUS: CPT

## 2019-06-15 PROCEDURE — 82962 GLUCOSE BLOOD TEST: CPT

## 2019-06-15 PROCEDURE — 74011636637 HC RX REV CODE- 636/637: Performed by: HOSPITALIST

## 2019-06-15 PROCEDURE — 93005 ELECTROCARDIOGRAM TRACING: CPT

## 2019-06-15 PROCEDURE — 85025 COMPLETE CBC W/AUTO DIFF WBC: CPT

## 2019-06-15 PROCEDURE — 82803 BLOOD GASES ANY COMBINATION: CPT

## 2019-06-15 PROCEDURE — 83880 ASSAY OF NATRIURETIC PEPTIDE: CPT

## 2019-06-15 PROCEDURE — 87086 URINE CULTURE/COLONY COUNT: CPT

## 2019-06-15 PROCEDURE — 70450 CT HEAD/BRAIN W/O DYE: CPT

## 2019-06-15 PROCEDURE — 84484 ASSAY OF TROPONIN QUANT: CPT

## 2019-06-15 PROCEDURE — 94660 CPAP INITIATION&MGMT: CPT

## 2019-06-15 PROCEDURE — 36600 WITHDRAWAL OF ARTERIAL BLOOD: CPT

## 2019-06-15 PROCEDURE — 76770 US EXAM ABDO BACK WALL COMP: CPT

## 2019-06-15 PROCEDURE — 96360 HYDRATION IV INFUSION INIT: CPT

## 2019-06-15 PROCEDURE — 80053 COMPREHEN METABOLIC PANEL: CPT

## 2019-06-15 PROCEDURE — 82550 ASSAY OF CK (CPK): CPT

## 2019-06-15 PROCEDURE — 65660000000 HC RM CCU STEPDOWN

## 2019-06-15 PROCEDURE — 74011250636 HC RX REV CODE- 250/636: Performed by: EMERGENCY MEDICINE

## 2019-06-15 PROCEDURE — 74011250637 HC RX REV CODE- 250/637: Performed by: HOSPITALIST

## 2019-06-15 PROCEDURE — 85610 PROTHROMBIN TIME: CPT

## 2019-06-15 RX ORDER — MONTELUKAST SODIUM 10 MG/1
10 TABLET ORAL
Status: DISCONTINUED | OUTPATIENT
Start: 2019-06-15 | End: 2019-06-17 | Stop reason: HOSPADM

## 2019-06-15 RX ORDER — TRAMADOL HYDROCHLORIDE 50 MG/1
50 TABLET ORAL
Status: DISCONTINUED | OUTPATIENT
Start: 2019-06-15 | End: 2019-06-17 | Stop reason: HOSPADM

## 2019-06-15 RX ORDER — MAGNESIUM SULFATE 100 %
4 CRYSTALS MISCELLANEOUS AS NEEDED
Status: DISCONTINUED | OUTPATIENT
Start: 2019-06-15 | End: 2019-06-17 | Stop reason: HOSPADM

## 2019-06-15 RX ORDER — ISOSORBIDE MONONITRATE 30 MG/1
30 TABLET, EXTENDED RELEASE ORAL DAILY
Status: DISCONTINUED | OUTPATIENT
Start: 2019-06-16 | End: 2019-06-17 | Stop reason: HOSPADM

## 2019-06-15 RX ORDER — ASPIRIN 81 MG/1
81 TABLET ORAL DAILY
Status: DISCONTINUED | OUTPATIENT
Start: 2019-06-16 | End: 2019-06-17 | Stop reason: HOSPADM

## 2019-06-15 RX ORDER — HEPARIN SODIUM 5000 [USP'U]/ML
5000 INJECTION, SOLUTION INTRAVENOUS; SUBCUTANEOUS EVERY 8 HOURS
Status: DISCONTINUED | OUTPATIENT
Start: 2019-06-15 | End: 2019-06-16

## 2019-06-15 RX ORDER — ALBUTEROL SULFATE 0.83 MG/ML
2.5 SOLUTION RESPIRATORY (INHALATION)
Status: DISCONTINUED | OUTPATIENT
Start: 2019-06-15 | End: 2019-06-17 | Stop reason: HOSPADM

## 2019-06-15 RX ORDER — BENZONATATE 100 MG/1
100 CAPSULE ORAL
Status: DISCONTINUED | OUTPATIENT
Start: 2019-06-15 | End: 2019-06-17 | Stop reason: HOSPADM

## 2019-06-15 RX ORDER — SODIUM CHLORIDE 9 MG/ML
100 INJECTION, SOLUTION INTRAVENOUS CONTINUOUS
Status: DISPENSED | OUTPATIENT
Start: 2019-06-15 | End: 2019-06-16

## 2019-06-15 RX ORDER — ATORVASTATIN CALCIUM 40 MG/1
80 TABLET, FILM COATED ORAL DAILY
Status: DISCONTINUED | OUTPATIENT
Start: 2019-06-16 | End: 2019-06-17 | Stop reason: HOSPADM

## 2019-06-15 RX ORDER — INSULIN LISPRO 100 [IU]/ML
INJECTION, SOLUTION INTRAVENOUS; SUBCUTANEOUS
Status: DISCONTINUED | OUTPATIENT
Start: 2019-06-15 | End: 2019-06-17 | Stop reason: HOSPADM

## 2019-06-15 RX ORDER — DEXTROSE MONOHYDRATE 100 MG/ML
125-250 INJECTION, SOLUTION INTRAVENOUS AS NEEDED
Status: DISCONTINUED | OUTPATIENT
Start: 2019-06-15 | End: 2019-06-17 | Stop reason: HOSPADM

## 2019-06-15 RX ORDER — FLUTICASONE FUROATE AND VILANTEROL 100; 25 UG/1; UG/1
1 POWDER RESPIRATORY (INHALATION) DAILY
Status: DISCONTINUED | OUTPATIENT
Start: 2019-06-15 | End: 2019-06-17 | Stop reason: HOSPADM

## 2019-06-15 RX ORDER — GABAPENTIN 300 MG/1
300 CAPSULE ORAL 2 TIMES DAILY
Status: DISCONTINUED | OUTPATIENT
Start: 2019-06-15 | End: 2019-06-17 | Stop reason: HOSPADM

## 2019-06-15 RX ORDER — LANOLIN ALCOHOL/MO/W.PET/CERES
325 CREAM (GRAM) TOPICAL 2 TIMES DAILY WITH MEALS
Status: DISCONTINUED | OUTPATIENT
Start: 2019-06-15 | End: 2019-06-17 | Stop reason: HOSPADM

## 2019-06-15 RX ORDER — INSULIN GLARGINE 100 [IU]/ML
30 INJECTION, SOLUTION SUBCUTANEOUS
Status: DISCONTINUED | OUTPATIENT
Start: 2019-06-15 | End: 2019-06-17 | Stop reason: HOSPADM

## 2019-06-15 RX ORDER — CARVEDILOL 6.25 MG/1
6.25 TABLET ORAL 2 TIMES DAILY WITH MEALS
Status: DISCONTINUED | OUTPATIENT
Start: 2019-06-15 | End: 2019-06-17 | Stop reason: HOSPADM

## 2019-06-15 RX ORDER — CLOPIDOGREL BISULFATE 75 MG/1
75 TABLET ORAL DAILY
Status: DISCONTINUED | OUTPATIENT
Start: 2019-06-16 | End: 2019-06-17 | Stop reason: HOSPADM

## 2019-06-15 RX ADMIN — GABAPENTIN 300 MG: 300 CAPSULE ORAL at 18:05

## 2019-06-15 RX ADMIN — CARVEDILOL 6.25 MG: 6.25 TABLET, FILM COATED ORAL at 18:05

## 2019-06-15 RX ADMIN — FERROUS SULFATE TAB 325 MG (65 MG ELEMENTAL FE) 325 MG: 325 (65 FE) TAB at 18:05

## 2019-06-15 RX ADMIN — INSULIN GLARGINE 30 UNITS: 100 INJECTION, SOLUTION SUBCUTANEOUS at 22:41

## 2019-06-15 RX ADMIN — MONTELUKAST 10 MG: 10 TABLET, FILM COATED ORAL at 22:42

## 2019-06-15 RX ADMIN — SODIUM CHLORIDE 100 ML/HR: 900 INJECTION, SOLUTION INTRAVENOUS at 16:55

## 2019-06-15 RX ADMIN — SODIUM CHLORIDE 1000 ML: 900 INJECTION, SOLUTION INTRAVENOUS at 14:01

## 2019-06-15 RX ADMIN — INSULIN LISPRO 2 UNITS: 100 INJECTION, SOLUTION INTRAVENOUS; SUBCUTANEOUS at 22:41

## 2019-06-15 RX ADMIN — HEPARIN SODIUM 5000 UNITS: 5000 INJECTION INTRAVENOUS; SUBCUTANEOUS at 18:06

## 2019-06-15 RX ADMIN — FLUTICASONE FUROATE AND VILANTEROL TRIFENATATE 1 PUFF: 100; 25 POWDER RESPIRATORY (INHALATION) at 22:43

## 2019-06-15 NOTE — ED NOTES
TRANSFER - OUT REPORT:    Verbal report given to [address] [place] [address] RN (name) on Patty Mendoza  being transferred to PCU (unit) for routine progression of care. Report consisted of patients Situation, Background, Assessment and   Recommendations(SBAR). Information from the following report(s) SBAR, Kardex, ED Summary, [facility] and Cardiac Rhythm NSR was reviewed with the receiving nurse. Lines:   Peripheral IV 06/15/19 Right Forearm (Active)   Site Assessment Clean, dry, & intact 6/15/2019 12:45 PM   Phlebitis Assessment 0 6/15/2019 12:45 PM   Infiltration Assessment 0 6/15/2019 12:45 PM   Dressing Status Clean, dry, & intact 6/15/2019 12:45 PM   Dressing Type Transparent 6/15/2019 12:45 PM   Hub Color/Line Status Flushed;Capped 6/15/2019 12:45 PM        Opportunity for questions and clarification was provided.       Patient transported with:   O2 @ 2 liters  Registered Nurse

## 2019-06-15 NOTE — ED PROVIDER NOTES
EMERGENCY DEPARTMENT HISTORY AND PHYSICAL EXAM      Date: 6/15/2019  Patient Name: Donell Childs    Please note that this dictation was completed with [employer], the computer voice recognition software. Quite often unanticipated grammatical, syntax, homophones, and other interpretive errors are inadvertently transcribed by the computer software. Please disregard these errors. Please excuse any errors that have escaped final proofreading. History of Presenting Illness     Chief Complaint   Patient presents with    Fall     pt here via EMS for a fall this morning related to weakness per pt, she denies dizziness, denies hitting head, no LOC, denies pain, reports \"i just get weak and fall\" pt fell wednesday, friday and this morning, pt walks with rollator and had this with her at each event     Extremity Weakness       History Provided By: Patient and EMS    HPI: Donell Childs, 78 y.o. female, with a past medical history significant for diabetes, heart failure, oxygen dependent COPD presenting the emergency department after multiple falls. Patient was seen by me on Wednesday for the same complaint. She states that she has fallen several more times since then. She fell twice yesterday and once this morning. Denies any injury. She denies any focal weakness. She states she fell onto her left side this morning, but did not sustain any injury. She uses a Rollator and by herself. She does have in-home services with [facility]. Patient denies any fever, chills, cough, chest pain, nausea vomiting or diarrhea. Generally feels weak. PCP: Rosi Elizondo, DO    No current facility-administered medications on file prior to encounter. Current Outpatient Medications on File Prior to Encounter   Medication Sig Dispense Refill    oxyCODONE-acetaminophen (PERCOCET) 5-325 mg per tablet Take 1 Tab by mouth every four (4) hours as needed for Pain. Max Daily Amount: 6 Tabs.  10 Tab 0    bumetanide (BUMEX) 2 mg tablet Take 1 Tab by mouth two (2) times a day. 2 Tab 0    potassium chloride SR (K-TAB) 20 mEq tablet Take 3 Tabs by mouth two (2) times a day. Take with bumex 180 Tab 0    magnesium oxide (MAG-OX) 400 mg tablet Take 2 Tabs by mouth two (2) times a day. Take with bumex 120 Tab 0    glipiZIDE (GLUCOTROL) 5 mg tablet Take 1 Tab by mouth daily (with dinner). 30 Tab 2    carvedilol (COREG) 6.25 mg tablet Take 6.25 mg by mouth two (2) times daily (with meals).  traMADol (ULTRAM) 50 mg tablet Take 50 mg by mouth three (3) times daily as needed (back pain).  nitroglycerin (NITROSTAT) 0.4 mg SL tablet by SubLINGual route every five (5) minutes as needed for Chest Pain.  tiotropium (SPIRIVA WITH HANDIHALER) 18 mcg inhalation capsule Take 1 Cap by inhalation daily.  spironolactone (ALDACTONE) 25 mg tablet Take 25 mg by mouth daily.  SITagliptin (JANUVIA) 100 mg tablet Take 100 mg by mouth daily.  isosorbide mononitrate ER (IMDUR) 30 mg tablet Take 30 mg by mouth daily.  ferrous sulfate 325 mg (65 mg iron) tablet Take 325 mg by mouth two (2) times daily (with meals).  insulin glargine (LANTUS) 100 unit/mL injection 30 Units by SubCUTAneous route nightly.  albuterol (PROVENTIL VENTOLIN) 2.5 mg /3 mL (0.083 %) nebulizer solution 3 mL by Nebulization route every four (4) hours as needed for Wheezing or Shortness of Breath. 24 Each 3    benzonatate (TESSALON) 100 mg capsule Take 1 Cap by mouth three (3) times daily as needed for Cough. 30 Cap 0    ipratropium (ATROVENT) 0.02 % nebulizer solution 2.5 mL by Nebulization route every six (6) hours as needed for Wheezing. 25 mL 3    aspirin delayed-release 81 mg tablet Take 81 mg by mouth daily.  atorvastatin (LIPITOR) 80 mg tablet Take 80 mg by mouth daily.  budesonide-formoterol (SYMBICORT) 160-4.5 mcg/actuation HFA inhaler Take 2 Puffs by inhalation two (2) times a day.       acetaminophen (TYLENOL) 500 mg tablet Take 500 mg by mouth every four (4) hours. Indications: BACK PAIN      omeprazole (PRILOSEC) 20 mg capsule Take 20 mg by mouth Before breakfast and dinner.  gabapentin (NEURONTIN) 300 mg capsule Take 300 mg by mouth three (3) times daily.  montelukast (SINGULAIR) 10 mg tablet Take 10 mg by mouth daily.  cholecalciferol (VITAMIN D3) 1,000 unit tablet Take 1,000 Units by mouth daily.  clopidogrel (PLAVIX) 75 mg tablet Take 75 mg by mouth daily.  fluticasone (FLONASE) 50 mcg/actuation nasal spray 2 Sprays by Both Nostrils route daily.  sertraline (ZOLOFT) 100 mg tablet Take 100 mg by mouth daily.  loratadine (CLARITIN) 10 mg tablet Take 10 mg by mouth daily as needed for Allergies or Rhinitis.  albuterol (PROVENTIL HFA, VENTOLIN HFA) 90 mcg/actuation inhaler Take 2 Puffs by inhalation every four (4) hours as needed for Wheezing or Shortness of Breath. Past History     Past Medical History:  Past Medical History:   Diagnosis Date    Asthma     CAD (coronary artery disease)     Chronic kidney disease     Chronic obstructive pulmonary disease ([facility] [address]     H/O acute myocardial infarction     Heart failure (HCC)     Hypertension        Past Surgical History:  Past Surgical History:   Procedure Laterality Date    HX CHOLECYSTECTOMY      gall stones removed    HX GYN         Family History:  No family history on file. Social History:  Social History     Tobacco Use    Smoking status: Current Every Day Smoker    Smokeless tobacco: Never Used   Substance Use Topics    Alcohol use: No    Drug use: No       Allergies: Allergies   Allergen Reactions    Garlic Shortness of Breath    Metformin Nausea and Vomiting    Pcn [Penicillins] Hives         Review of Systems   Review of Systems   Constitutional: Positive for fatigue. Negative for chills and fever. HENT: Negative for congestion and sore throat. Eyes: Negative for visual disturbance. Respiratory: Negative for cough and shortness of breath. Cardiovascular: Negative for chest pain and leg swelling. Gastrointestinal: Negative for abdominal pain, blood in stool, diarrhea and nausea. Endocrine: Negative for polyuria. Genitourinary: Negative for dysuria, flank pain, vaginal bleeding and vaginal discharge. Musculoskeletal: Negative for myalgias. Skin: Negative for rash. Allergic/Immunologic: Negative for immunocompromised state. Neurological: Positive for weakness. Negative for headaches. Psychiatric/Behavioral: Negative for confusion. Physical Exam   Physical Exam   Constitutional: oriented to person, place, and time. appears well-developed and well-nourished. Obese female,  HENT:   Head: Normocephalic and atraumatic. I mucous membranes, nasal cannula in place eyes: Pupils are equal, round, and reactive to light. Conjunctivae are normal. Right eye exhibits no discharge. Left eye exhibits no discharge. Neck: Normal range of motion. Neck supple. No tracheal deviation present. Cardiovascular: Normal rate, regular rhythm and normal heart sounds. No murmur heard. Pulmonary/Chest: Effort normal and breath sounds normal. No respiratory distress. no wheezes. no rales. Abdominal: Soft. Bowel sounds are normal. There is no tenderness. There is no rebound and no guarding. Musculoskeletal: Normal range of motion. exhibits no edema, tenderness or deformity. Neurological: alert and oriented to person, place, and time. No facial droop, no slurring of speech, tongue is midline. Equal strength in the upper and lower extremities. Normal sensation to light touch. Skin: Skin is warm and dry. No rash noted. No erythema. Psychiatric: behavior is normal.   Nursing note and vitals reviewed. Diagnostic Study Results     Labs -   No results found for this or any previous visit (from the past 12 hour(s)).     Radiologic Studies -   No orders to display     CT Results  (Last 48 hours)    None        CXR Results  (Last 48 hours)    None            Medical Decision Making   I am the first provider for this patient. I reviewed the vital signs, available nursing notes, past medical history, past surgical history, family history and social history. Vital Signs-Reviewed the patient's vital signs. No data found. Pulse Oximetry Analysis -95% on 2 L nasal cannula    Cardiac Monitor:   Normal sinus rhythm, rate 73    EKG interpretation: (Preliminary)  EKG shows sinus rhythm, rate 73. Normal axis. QTC mildly prolonged at 486. PACs. No acute ischemic changes. Records Reviewed: Nursing Notes and Old Medical Records    Provider Notes (Medical Decision Making):   Patient here with multiple falls. She is nontoxic, well-appearing on exam, her falls are likely from chronic debility and worsening of her conditioning. On her prior evaluation treated have hypokalemia, will recheck labs, obtain CT of the head. I anticipate the patient may need hospitalization for safe disposition as she is not safe at home, she does have good in-home help during the week, but may need even even higher level of assistance at this time, may need to consider going to skilled nursing or rehab. ED Course:   Initial assessment performed. The patients presenting problems have been discussed, and they are in agreement with the care plan formulated and outlined with them. I have encouraged them to ask questions as they arise throughout their visit. Critical Care Time:   None    Disposition:  I have spent 40 minutes of critical care time in evaluating and treating this patient. This includes time spent at bedside, time with family and decision makers, documentation, review of labs and imaging, and/or consultation with specialists. It does not include time spent on separately billed procedures.      This patient presents with a critical illness or injury that acutely impairs one or more vital organ systems such that there is a high probability of imminent or life threatening deterioration in the patient's condition. This case involved decision making of high complexity to assess, manipulate, and support vital organ system failure and/or to prevent further life threatening deterioration of the patient's condition. Failure to initiate these interventions on an urgent basis would likely result in sudden, clinically significant or life threatening deterioration in the patient's condition. Abnormal findings supporting critical care: acute renal failure   Interventions to support critical care: IVF, consults, admission  Failure to intervene may result in: Worsening renal dysfunction. Diagnosis     Clinical Impression:   1. LEOLA (acute kidney injury) ([address]    2. Recurrent falls    3. Hypokalemia    4. Chronic respiratory failure with hypercapnia (HCC)        Attestations:   This note was completed by Blossom Walls DO

## 2019-06-15 NOTE — ED TRIAGE NOTES
Pt here via EMS for a fall this morning while she was getting her weight in the bathroom. Pt denies dizziness and says \"I just get weak and fall\" pt is supposed to use a rollator and she says she does. Pt fell Wednesday, per ED notes she slid out of the bed, however pt is telling this nurse \"I dont know what it was\". She came to and was evaluated at ED, she fell again Friday night and called EMS to help her up but refused to come to ED. She then fell this morning while weighing herself, rollator was to the side of the scale and pt fell backwards but denies striking head. BS WNL at 251, EKG WNL, 118/63 100% on 2L N/C chronic for COPD. Pt has not ate this morning yet. Upon MD evaluation pt c/o bilateral legs sore to touch however able to move extremities and cap refill less than 3 seconds.

## 2019-06-15 NOTE — PROGRESS NOTES
Pharmacy Clarification of Prior to Admission Medication Regimen-Follow Up Needed    The patient was unable to participate in interview regarding clarification of the prior to admission medication regimen. MHT called the patient's emergency contact, the patient's son, Mike Caery [phone], to find out about the patient's PTA medication history. Patient's son stated the patient receives medication assistance through [facility]. MHT called [facility], [phone], and left a message regarding the patient's medications. MHT did not hear back from [facility] prior to the end of the shift. The medication history will need to be re-evaluated at a later time during admission when patient is willing/able to participate or if more information is provided.     Thank you,  Enmanuel Alcaraz CPhT  Medication History Pharmacy Technician

## 2019-06-15 NOTE — H&P
Hospitalist Admission Note    NAME: Mike Ruiz   :  1950   MRN:  [id number]     Date/Time:  6/15/2019 4:37 PM    Patient PCP: Rosio, MD Shannan  Patient switched from [place] to [facility], does not know new PCP name  ______________________________________________________________________   Assessment & Plan:  Recurrent fall, POA suspect due to dehydration and lethargy from hypercapnia  LEOLA on CKD stage 3, POA  Cr baseline 1.7, now 2.65. Suspect dehydration  Lethargy with mild b/l hand asterixis due to hypercapnia, POA    Chronic respiratory failure on 2L with hypercapnia likely GURPREET. Daughter reports home sleep study negative for sleep apnea but I suspect it meant her O2 sat did not drop. Morbid obesity  Chronic combined diastolic and systolic chf, POA  Grade 1 diastolic chf 642, EF 38% on stress test   CAD hx stent  DM type 2    --ABG 7.33/62/125/33 on 2L. Patient falling off drowsy easily and words becoming slurred. Oriented x 3 with stimuli, +b/l hand asterixis. Will put on bipap. Pulmonology consult. --reduce neurontin from tid to bid due to LEOLA  --check UA, renal US  --hold bumex. Given 1L bolus in ER. Will give NS 100ml/hr x 10 hours and reassess in AM.  No edema  --consult pt/ot in AM.    --patient not orthostatic but BP dropping low from 145 to 464 systolic and patient becoming weak  --hold glipizide due to leola. Continue lantus and [place] and [place]. Accucheks and SSI. Body mass index is 37.11 kg/m². Code:  DNR/DNI confirmed with patient and daughter present  DVT prophylaxis: heparin  Surrogate decision maker:  Daughter Emily Huggins        Subjective:   CHIEF COMPLAINT:  Falling, legs getting weak    HISTORY OF PRESENT ILLNESS:     Mike Ruiz is a 78 y.o.  female past medical history significant for diabetes, heart failure, oxygen dependent COPD on 2L presents to ER after recurrent falls. Seen in ER 4 days ago for same. Yesterday fell 2x and today 1x. Uses rollator baseline. Most of falls occurring in bathroom. Patient says when she is standing or walking for long time, legs become weak. Denies any injury from falls. Denies LOC, chest pain, cough, SOB. No fever or chills. Appetite poor this week. Lives in senior housing apartment and used pulled cord after fall today. Daughter mentions patient had intermittent slurred speech earlier this week. Last admitted  for generalized weakness due to overdiuresis with hyponatremia and hypokalemia, ground level fall. We were asked to admit for work up and evaluation of the above problems. Past Medical History:   Diagnosis Date    Asthma     CAD (coronary artery disease)     Chronic kidney disease     Chronic obstructive pulmonary disease ([facility] [address]     H/O acute myocardial infarction     Heart failure (HCC)     Hypertension       Past Surgical History:   Procedure Laterality Date    HX CHOLECYSTECTOMY      gall stones removed    HX GYN       Social History     Tobacco Use    Smoking status: Former Smoker     Last attempt to quit: 12/15/2018     Years since quittin.4    Smokeless tobacco: Never Used   Substance Use Topics    Alcohol use: No    Lives alone    Family History   Problem Relation Age of Onset    Diabetes Mother     Heart Attack Mother       Allergies   Allergen Reactions    Garlic Shortness of Breath    Metformin Nausea and Vomiting    Pcn [Penicillins] Hives        Prior to Admission medications    Medication Sig Start Date End Date Taking? Authorizing Provider   oxyCODONE-acetaminophen (PERCOCET) 5-325 mg per tablet Take 1 Tab by mouth every four (4) hours as needed for Pain. Max Daily Amount: 6 Tabs. 18   Nga Marion MD   bumetanide (BUMEX) 2 mg tablet Take 1 Tab by mouth two (2) times a day. 18   Nelson Garcia MD   potassium chloride SR (K-TAB) 20 mEq tablet Take 3 Tabs by mouth two (2) times a day.  Take with bumex 1/6/18   Donnell Garcia MD   magnesium oxide (MAG-OX) 400 mg tablet Take 2 Tabs by mouth two (2) times a day. Take with bumex 1/6/18   Donnell Garcia MD   glipiZIDE (GLUCOTROL) 5 mg tablet Take 1 Tab by mouth daily (with dinner). 1/6/18   Donnell Garcia MD   carvedilol (COREG) 6.25 mg tablet Take 6.25 mg by mouth two (2) times daily (with meals). Provider, Historical   traMADol (ULTRAM) 50 mg tablet Take 50 mg by mouth three (3) times daily as needed (back pain). Provider, Historical   nitroglycerin (NITROSTAT) 0.4 mg SL tablet by SubLINGual route every five (5) minutes as needed for Chest Pain. Provider, Historical   tiotropium (SPIRIVA WITH HANDIHALER) 18 mcg inhalation capsule Take 1 Cap by inhalation daily. Provider, Historical   spironolactone (ALDACTONE) 25 mg tablet Take 25 mg by mouth daily. Provider, Historical   SITagliptin (JANUVIA) 100 mg tablet Take 100 mg by mouth daily. Provider, Historical   isosorbide mononitrate ER (IMDUR) 30 mg tablet Take 30 mg by mouth daily. Provider, Historical   ferrous sulfate 325 mg (65 mg iron) tablet Take 325 mg by mouth two (2) times daily (with meals). Provider, Historical   insulin glargine (LANTUS) 100 unit/mL injection 30 Units by SubCUTAneous route nightly. Provider, Historical   albuterol (PROVENTIL VENTOLIN) 2.5 mg /3 mL (0.083 %) nebulizer solution 3 mL by Nebulization route every four (4) hours as needed for Wheezing or Shortness of Breath. 3/15/16   Donnell Garcia MD   benzonatate (TESSALON) 100 mg capsule Take 1 Cap by mouth three (3) times daily as needed for Cough. 3/15/16   Donnell Garcia MD   ipratropium (ATROVENT) 0.02 % nebulizer solution 2.5 mL by Nebulization route every six (6) hours as needed for Wheezing. 3/15/16   Donnell Garcia MD   aspirin delayed-release 81 mg tablet Take 81 mg by mouth daily.     Provider, Historical   atorvastatin (LIPITOR) 80 mg tablet Take 80 mg by mouth daily. Provider, Historical   budesonide-formoterol (SYMBICORT) 160-4.5 mcg/actuation HFA inhaler Take 2 Puffs by inhalation two (2) times a day. Provider, Historical   acetaminophen (TYLENOL) 500 mg tablet Take 500 mg by mouth every four (4) hours. Indications: BACK PAIN    Provider, Historical   omeprazole (PRILOSEC) 20 mg capsule Take 20 mg by mouth Before breakfast and dinner. Provider, Historical   gabapentin (NEURONTIN) 300 mg capsule Take 300 mg by mouth three (3) times daily. Provider, Historical   montelukast (SINGULAIR) 10 mg tablet Take 10 mg by mouth daily. Provider, Historical   cholecalciferol (VITAMIN D3) 1,000 unit tablet Take 1,000 Units by mouth daily. Provider, Historical   clopidogrel (PLAVIX) 75 mg tablet Take 75 mg by mouth daily. Provider, Historical   fluticasone (FLONASE) 50 mcg/actuation nasal spray 2 Sprays by Both Nostrils route daily. Provider, Historical   sertraline (ZOLOFT) 100 mg tablet Take 100 mg by mouth daily. Provider, Historical   loratadine (CLARITIN) 10 mg tablet Take 10 mg by mouth daily as needed for Allergies or Rhinitis. Other, MD Shannan   albuterol (PROVENTIL HFA, VENTOLIN HFA) 90 mcg/actuation inhaler Take 2 Puffs by inhalation every four (4) hours as needed for Wheezing or Shortness of Breath. Other, MD Shannan     REVIEW OF SYSTEMS:  POSITIVE= Bold.   Negative = normal text  General:  fever, chills, sweats, generalized weakness, daytime drowsiness, weight loss/gain, loss of appetite  Eyes:  blurred vision, eye pain, loss of vision, diplopia  Ear Nose and Throat:  rhinorrhea, pharyngitis  Respiratory:   cough, sputum production, SOB, wheezing, LOVE, pleuritic pain  Cardiology:  chest pain, palpitations, orthopnea, PND, edema, syncope   Gastrointestinal:  abdominal pain, N/V, dysphagia, diarrhea, constipation, bleeding  Genitourinary:  frequency, urgency, dysuria, hematuria, incontinence  Muskuloskeletal : arthralgia, myalgia  Hematology:  easy bruising, bleeding, lymphadenopathy  Dermatological:  rash, ulceration, pruritis  Endocrine:  hot flashes or polydipsia  Neurological:  headache, dizziness, confusion, focal weakness--legs, paresthesia, memory loss, gait disturbance  Psychological: anxiety, depression, agitation      Objective:   VITALS:    Visit Vitals  /75   Pulse 72   Temp 98.5 °F (36.9 °C)   Resp 16   Ht 5' (1.524 m)   Wt 86.2 kg (190 lb)   SpO2 98%   BMI 37.11 kg/m²     Temp (24hrs), Av.5 °F (36.9 °C), Min:98.5 °F (36.9 °C), Max:98.5 °F (36.9 °C)    Body mass index is 37.11 kg/m². PHYSICAL EXAM:    General:    Morbidly obese, oriented x 3 but gets drowsy/sleepy very easily and words becoming slurred as she is drifting off, cooperative, no distress, appears stated age. HEENT: Atraumatic, anicteric sclerae, pink conjunctivae     No oral ulcers, mucosa dry, edentulous, throat clear. Hearing intact. Neck:  Supple, symmetrical,  thyroid: non tender  Lungs:   Clear to auscultation bilaterally. No Wheezing or Rhonchi. No rales. Chest wall:  No tenderness  No Accessory muscle use. Heart:   Regular  rhythm,  No  murmur   No gallop. No edema. Abdomen:   Soft, non-tender. Not distended. Bowel sounds normal. No masses  Extremities: No cyanosis. No clubbing  Skin:     Not pale Not Jaundiced  No rashes   Psych:  Good insight. Not depressed. Not anxious or agitated. Neurologic: EOMs intact. No facial asymmetry. Clear speech when fully alert but when she gets drowsy and start falling asleep, speech becomes slurred.  Symmetrical strength, oriented X 3. +mild b/l asterixis      IMAGING RESULTS:   []       I have personally reviewed the actual   []     CXR  []     CT scan  CXR:  CT :  EKG:   ________________________________________________________________________  Care Plan discussed with:    Comments   Patient y    Family  y Daughter Hilary SEAMAN     Care Manager                    Consultant: ________________________________________________________________________  Prophylaxis:  GI none   DVT heparin   ________________________________________________________________________  Recommended Disposition:   Home with Family    HH/PT/OT/RN ?   SNF/LTC ? [facility]    ________________________________________________________________________  Code Status:  Full Code    DNR/DNI y   ________________________________________________________________________  TOTAL TIME:  50 minutes      Comments    y Reviewed previous records       ______________________________________________________________________  Taisha Eric MD      Procedures: see electronic medical records for all procedures/Xrays and details which were not copied into this note but were reviewed prior to creation of Plan.     LAB DATA REVIEWED:    Recent Results (from the past 24 hour(s))   EKG, 12 LEAD, INITIAL    Collection Time: 06/15/19 12:07 PM   Result Value Ref Range    Ventricular Rate 73 BPM    Atrial Rate 73 BPM    P-R Interval 116 ms    QRS Duration 112 ms    Q-T Interval 442 ms    QTC Calculation (Bezet) 486 ms    Calculated P Axis 39 degrees    Calculated R Axis 7 degrees    Calculated T Axis 11 degrees    Diagnosis       Sinus rhythm with premature atrial complexes  When compared with ECG of 12-JUN-2019 11:35,  premature atrial complexes are now present     CBC WITH AUTOMATED DIFF    Collection Time: 06/15/19 12:46 PM   Result Value Ref Range    WBC 7.9 3.6 - 11.0 K/uL    RBC 4.25 3.80 - 5.20 M/uL    HGB 11.4 (L) 11.5 - 16.0 g/dL    HCT 38.3 35.0 - 47.0 %    MCV 90.1 80.0 - 99.0 FL    MCH 26.8 26.0 - 34.0 PG    MCHC 29.8 (L) 30.0 - 36.5 g/dL    RDW 14.3 11.5 - 14.5 %    PLATELET 436 867 - 304 K/uL    MPV 11.6 8.9 - 12.9 FL    NRBC 0.0 0  WBC    ABSOLUTE NRBC 0.00 0.00 - 0.01 K/uL    NEUTROPHILS 74 32 - 75 %    LYMPHOCYTES 12 12 - 49 %    MONOCYTES 7 5 - 13 %    EOSINOPHILS 5 0 - 7 %    BASOPHILS 1 0 - 1 %    IMMATURE GRANULOCYTES 1 (H) 0.0 - 0.5 %    ABS. NEUTROPHILS 5.9 1.8 - 8.0 K/UL    ABS. LYMPHOCYTES 1.0 0.8 - 3.5 K/UL    ABS. MONOCYTES 0.6 0.0 - 1.0 K/UL    ABS. EOSINOPHILS 0.4 0.0 - 0.4 K/UL    ABS. BASOPHILS 0.0 0.0 - 0.1 K/UL    ABS. IMM. GRANS. 0.0 0.00 - 0.04 K/UL    DF AUTOMATED     METABOLIC PANEL, COMPREHENSIVE    Collection Time: 06/15/19 12:46 PM   Result Value Ref Range    Sodium 134 (L) 136 - 145 mmol/L    Potassium 3.3 (L) 3.5 - 5.1 mmol/L    Chloride 92 (L) 97 - 108 mmol/L    CO2 34 (H) 21 - 32 mmol/L    Anion gap 8 5 - 15 mmol/L    Glucose 248 (H) 65 - 100 mg/dL    BUN 74 (H) 6 - 20 MG/DL    Creatinine 2.65 (H) 0.55 - 1.02 MG/DL    BUN/Creatinine ratio 28 (H) 12 - 20      GFR est AA 22 (L) >60 ml/min/1.73m2    GFR est non-AA 18 (L) >60 ml/min/1.73m2    Calcium 8.9 8.5 - 10.1 MG/DL    Bilirubin, total 0.6 0.2 - 1.0 MG/DL    ALT (SGPT) 63 12 - 78 U/L    AST (SGOT) 59 (H) 15 - 37 U/L    Alk.  phosphatase 85 45 - 117 U/L    Protein, total 8.2 6.4 - 8.2 g/dL    Albumin 3.9 3.5 - 5.0 g/dL    Globulin 4.3 (H) 2.0 - 4.0 g/dL    A-G Ratio 0.9 (L) 1.1 - 2.2     CK    Collection Time: 06/15/19 12:46 PM   Result Value Ref Range     (H) 26 - 192 U/L   MAGNESIUM    Collection Time: 06/15/19 12:46 PM   Result Value Ref Range    Magnesium 2.2 1.6 - 2.4 mg/dL   PHOSPHORUS    Collection Time: 06/15/19 12:46 PM   Result Value Ref Range    Phosphorus 5.5 (H) 2.6 - 4.7 MG/DL   NT-PRO BNP    Collection Time: 06/15/19 12:46 PM   Result Value Ref Range    NT pro- (H) <125 PG/ML   TROPONIN I    Collection Time: 06/15/19 12:46 PM   Result Value Ref Range    Troponin-I, Qt. <0.05 <0.05 ng/mL   PROTHROMBIN TIME + INR    Collection Time: 06/15/19  1:24 PM   Result Value Ref Range    INR 1.0 0.9 - 1.1      Prothrombin time 10.3 9.0 - 11.1 sec   POC G3 - PUL    Collection Time: 06/15/19  3:12 PM   Result Value Ref Range    pH (POC) 7.330 (L) 7.35 - 7.45      pCO2 (POC) 61.6 (H) 35.0 - 45.0 MMHG    pO2 (POC) 125 (H) 80 - 100 MMHG    HCO3 (POC) 32.5 (H) 22 - 26 MMOL/L    sO2 (POC) 98 (H) 92 - 97 %    Base excess (POC) 7 mmol/L    Site LEFT BRACHIAL      Device: NASAL CANNULA      Flow rate (POC) 2 L/M    Allens test (POC) N/A      Specimen type (POC) ARTERIAL      Total resp.  rate 18

## 2019-06-15 NOTE — ED NOTES
Pt son asking if pt legs can get evaluated for \"some sort of blockadge or something\".   Will discuss with MD.

## 2019-06-15 NOTE — ED NOTES
TRANSFER - OUT REPORT:    Verbal report given to n(name) on Donell Childs  being transferred to [address] [place] [address]  for routine progression of care       Report consisted of patients Situation, Background, Assessment and   Recommendations(SBAR). Information from the following report(s) SBAR, Kardex, ED Summary, [facility] and Cardiac Rhythm NSR was reviewed with the receiving nurse. Lines:   Peripheral IV 06/15/19 Right Forearm (Active)   Site Assessment Clean, dry, & intact 6/15/2019 12:45 PM   Phlebitis Assessment 0 6/15/2019 12:45 PM   Infiltration Assessment 0 6/15/2019 12:45 PM   Dressing Status Clean, dry, & intact 6/15/2019 12:45 PM   Dressing Type Transparent 6/15/2019 12:45 PM   Hub Color/Line Status Flushed;Capped 6/15/2019 12:45 PM        Opportunity for questions and clarification was provided. Patient transported with:   transport. Urine still needed and ultrasound.

## 2019-06-15 NOTE — ED NOTES
Placed pt off of bipap to eat meal.  Placed back on 2 L 02 n/c. Will keep off for approx 2 hours per orders.

## 2019-06-15 NOTE — ED NOTES
Pt denies dizziness when completing orthostatics but she got weak when standing up and leaning forward onto walker. Assisted pt back into bed and applied purewik instead of ambulating to restroom. MD aware. MD updated family on plan of care.

## 2019-06-15 NOTE — ED NOTES
ABG done, RT says pt is compensating, however after discussing with MD pt should use bipap due to this could be causing her to fall and pt is drowsy throughout her examination. RT aware.

## 2019-06-16 LAB
ANION GAP SERPL CALC-SCNC: 6 MMOL/L (ref 5–15)
ARTERIAL PATENCY WRIST A: YES
ATRIAL RATE: 73 BPM
BASE EXCESS BLD CALC-SCNC: 9 MMOL/L
BDY SITE: ABNORMAL
BUN SERPL-MCNC: 54 MG/DL (ref 6–20)
BUN/CREAT SERPL: 36 (ref 12–20)
CALCIUM SERPL-MCNC: 8.4 MG/DL (ref 8.5–10.1)
CALCULATED P AXIS, ECG09: 39 DEGREES
CALCULATED R AXIS, ECG10: 7 DEGREES
CALCULATED T AXIS, ECG11: 11 DEGREES
CHLORIDE SERPL-SCNC: 97 MMOL/L (ref 97–108)
CO2 SERPL-SCNC: 32 MMOL/L (ref 21–32)
CREAT SERPL-MCNC: 1.49 MG/DL (ref 0.55–1.02)
DIAGNOSIS, 93000: NORMAL
ERYTHROCYTE [DISTWIDTH] IN BLOOD BY AUTOMATED COUNT: 14.2 % (ref 11.5–14.5)
GAS FLOW.O2 O2 DELIVERY SYS: ABNORMAL L/MIN
GLUCOSE BLD STRIP.AUTO-MCNC: 156 MG/DL (ref 65–100)
GLUCOSE BLD STRIP.AUTO-MCNC: 166 MG/DL (ref 65–100)
GLUCOSE BLD STRIP.AUTO-MCNC: 200 MG/DL (ref 65–100)
GLUCOSE BLD STRIP.AUTO-MCNC: 202 MG/DL (ref 65–100)
GLUCOSE SERPL-MCNC: 159 MG/DL (ref 65–100)
HCO3 BLD-SCNC: 34.3 MMOL/L (ref 22–26)
HCT VFR BLD AUTO: 30.2 % (ref 35–47)
HGB BLD-MCNC: 9.5 G/DL (ref 11.5–16)
MAGNESIUM SERPL-MCNC: 2.1 MG/DL (ref 1.6–2.4)
MCH RBC QN AUTO: 27.5 PG (ref 26–34)
MCHC RBC AUTO-ENTMCNC: 31.5 G/DL (ref 30–36.5)
MCV RBC AUTO: 87.3 FL (ref 80–99)
NRBC # BLD: 0 K/UL (ref 0–0.01)
NRBC BLD-RTO: 0 PER 100 WBC
O2/TOTAL GAS SETTING VFR VENT: 28 %
P-R INTERVAL, ECG05: 116 MS
PCO2 BLD: 56 MMHG (ref 35–45)
PEEP RESPIRATORY: 5 CMH2O
PH BLD: 7.4 [PH] (ref 7.35–7.45)
PHOSPHATE SERPL-MCNC: 4.6 MG/DL (ref 2.6–4.7)
PIP ISTAT,IPIP: 14
PLATELET # BLD AUTO: 164 K/UL (ref 150–400)
PMV BLD AUTO: 11.4 FL (ref 8.9–12.9)
PO2 BLD: 78 MMHG (ref 80–100)
POTASSIUM SERPL-SCNC: 2.4 MMOL/L (ref 3.5–5.1)
Q-T INTERVAL, ECG07: 442 MS
QRS DURATION, ECG06: 112 MS
QTC CALCULATION (BEZET), ECG08: 486 MS
RBC # BLD AUTO: 3.46 M/UL (ref 3.8–5.2)
SAO2 % BLD: 95 % (ref 92–97)
SERVICE CMNT-IMP: ABNORMAL
SODIUM SERPL-SCNC: 135 MMOL/L (ref 136–145)
SPECIMEN TYPE: ABNORMAL
TOTAL RESP. RATE, ITRR: 14
VENTRICULAR RATE, ECG03: 73 BPM
WBC # BLD AUTO: 5.7 K/UL (ref 3.6–11)

## 2019-06-16 PROCEDURE — 94660 CPAP INITIATION&MGMT: CPT

## 2019-06-16 PROCEDURE — 85027 COMPLETE CBC AUTOMATED: CPT

## 2019-06-16 PROCEDURE — 83735 ASSAY OF MAGNESIUM: CPT

## 2019-06-16 PROCEDURE — 84100 ASSAY OF PHOSPHORUS: CPT

## 2019-06-16 PROCEDURE — 74011250637 HC RX REV CODE- 250/637: Performed by: HOSPITALIST

## 2019-06-16 PROCEDURE — 74011250637 HC RX REV CODE- 250/637: Performed by: NURSE PRACTITIONER

## 2019-06-16 PROCEDURE — 74011250636 HC RX REV CODE- 250/636: Performed by: EMERGENCY MEDICINE

## 2019-06-16 PROCEDURE — 36415 COLL VENOUS BLD VENIPUNCTURE: CPT

## 2019-06-16 PROCEDURE — 65660000000 HC RM CCU STEPDOWN

## 2019-06-16 PROCEDURE — 82962 GLUCOSE BLOOD TEST: CPT

## 2019-06-16 PROCEDURE — 80048 BASIC METABOLIC PNL TOTAL CA: CPT

## 2019-06-16 PROCEDURE — 82803 BLOOD GASES ANY COMBINATION: CPT

## 2019-06-16 PROCEDURE — 74011250637 HC RX REV CODE- 250/637: Performed by: FAMILY MEDICINE

## 2019-06-16 PROCEDURE — 74011636637 HC RX REV CODE- 636/637: Performed by: HOSPITALIST

## 2019-06-16 PROCEDURE — 74011250636 HC RX REV CODE- 250/636: Performed by: HOSPITALIST

## 2019-06-16 PROCEDURE — 36600 WITHDRAWAL OF ARTERIAL BLOOD: CPT

## 2019-06-16 RX ORDER — HEPARIN SODIUM 5000 [USP'U]/ML
7500 INJECTION, SOLUTION INTRAVENOUS; SUBCUTANEOUS EVERY 8 HOURS
Status: DISCONTINUED | OUTPATIENT
Start: 2019-06-17 | End: 2019-06-17 | Stop reason: HOSPADM

## 2019-06-16 RX ORDER — NYSTATIN 100000 [USP'U]/ML
500000 SUSPENSION ORAL 4 TIMES DAILY
Status: DISCONTINUED | OUTPATIENT
Start: 2019-06-16 | End: 2019-06-17 | Stop reason: HOSPADM

## 2019-06-16 RX ORDER — POTASSIUM CHLORIDE 750 MG/1
40 TABLET, FILM COATED, EXTENDED RELEASE ORAL
Status: COMPLETED | OUTPATIENT
Start: 2019-06-16 | End: 2019-06-16

## 2019-06-16 RX ORDER — POTASSIUM CHLORIDE 20 MEQ/1
40 TABLET, EXTENDED RELEASE ORAL
Status: COMPLETED | OUTPATIENT
Start: 2019-06-16 | End: 2019-06-16

## 2019-06-16 RX ADMIN — CLOPIDOGREL BISULFATE 75 MG: 75 TABLET ORAL at 08:33

## 2019-06-16 RX ADMIN — GABAPENTIN 300 MG: 300 CAPSULE ORAL at 17:21

## 2019-06-16 RX ADMIN — CARVEDILOL 6.25 MG: 6.25 TABLET, FILM COATED ORAL at 17:21

## 2019-06-16 RX ADMIN — NYSTATIN 500000 UNITS: 100000 SUSPENSION ORAL at 21:49

## 2019-06-16 RX ADMIN — NYSTATIN 500000 UNITS: 100000 SUSPENSION ORAL at 17:21

## 2019-06-16 RX ADMIN — NYSTATIN 500000 UNITS: 100000 SUSPENSION ORAL at 11:28

## 2019-06-16 RX ADMIN — MONTELUKAST 10 MG: 10 TABLET, FILM COATED ORAL at 21:49

## 2019-06-16 RX ADMIN — HEPARIN SODIUM 5000 UNITS: 5000 INJECTION INTRAVENOUS; SUBCUTANEOUS at 08:33

## 2019-06-16 RX ADMIN — INSULIN LISPRO 3 UNITS: 100 INJECTION, SOLUTION INTRAVENOUS; SUBCUTANEOUS at 12:47

## 2019-06-16 RX ADMIN — FERROUS SULFATE TAB 325 MG (65 MG ELEMENTAL FE) 325 MG: 325 (65 FE) TAB at 17:21

## 2019-06-16 RX ADMIN — HEPARIN SODIUM 7500 UNITS: 5000 INJECTION INTRAVENOUS; SUBCUTANEOUS at 23:58

## 2019-06-16 RX ADMIN — ISOSORBIDE MONONITRATE 30 MG: 30 TABLET, EXTENDED RELEASE ORAL at 08:32

## 2019-06-16 RX ADMIN — INSULIN LISPRO 2 UNITS: 100 INJECTION, SOLUTION INTRAVENOUS; SUBCUTANEOUS at 09:09

## 2019-06-16 RX ADMIN — INSULIN GLARGINE 30 UNITS: 100 INJECTION, SOLUTION SUBCUTANEOUS at 21:49

## 2019-06-16 RX ADMIN — INSULIN LISPRO 3 UNITS: 100 INJECTION, SOLUTION INTRAVENOUS; SUBCUTANEOUS at 17:21

## 2019-06-16 RX ADMIN — UMECLIDINIUM 1 PUFF: 62.5 AEROSOL, POWDER ORAL at 08:32

## 2019-06-16 RX ADMIN — TRAMADOL HYDROCHLORIDE 50 MG: 50 TABLET, FILM COATED ORAL at 17:21

## 2019-06-16 RX ADMIN — HEPARIN SODIUM 5000 UNITS: 5000 INJECTION INTRAVENOUS; SUBCUTANEOUS at 01:32

## 2019-06-16 RX ADMIN — ATORVASTATIN CALCIUM 80 MG: 40 TABLET, FILM COATED ORAL at 08:33

## 2019-06-16 RX ADMIN — HEPARIN SODIUM 5000 UNITS: 5000 INJECTION INTRAVENOUS; SUBCUTANEOUS at 17:21

## 2019-06-16 RX ADMIN — ASPIRIN 81 MG: 81 TABLET ORAL at 08:32

## 2019-06-16 RX ADMIN — POTASSIUM CHLORIDE 40 MEQ: 20 TABLET, EXTENDED RELEASE ORAL at 06:16

## 2019-06-16 RX ADMIN — FERROUS SULFATE TAB 325 MG (65 MG ELEMENTAL FE) 325 MG: 325 (65 FE) TAB at 08:33

## 2019-06-16 RX ADMIN — POTASSIUM CHLORIDE 40 MEQ: 750 TABLET, FILM COATED, EXTENDED RELEASE ORAL at 08:32

## 2019-06-16 RX ADMIN — GABAPENTIN 300 MG: 300 CAPSULE ORAL at 08:33

## 2019-06-16 RX ADMIN — LINAGLIPTIN 5 MG: 5 TABLET, FILM COATED ORAL at 08:33

## 2019-06-16 RX ADMIN — CARVEDILOL 6.25 MG: 6.25 TABLET, FILM COATED ORAL at 08:33

## 2019-06-16 NOTE — PROGRESS NOTES
TELE-HOSPITALIST CROSS COVER NOTE:    Visit Vitals  /53   Pulse 67   Temp 97.9 °F (36.6 °C)   Resp 16   Ht 5' (1.524 m)   Wt 86.2 kg (190 lb)   SpO2 99%   BMI 37.11 kg/m²         D/W RN; medical records reviewed; K 2.4; electrolyte replacement with KCL 40mEq x 1 now      MiiPharosibb

## 2019-06-16 NOTE — PROGRESS NOTES
Problem: Falls - Risk of  Goal: *Absence of Falls  Description  Document [facility] President Fall Risk and appropriate interventions in the flowsheet.   Outcome: Progressing Towards Goal

## 2019-06-16 NOTE — PROGRESS NOTES
Hospitalist Progress Note    NAME: Nidhi Anderson   :  1950   MRN:  [id number]       Interim Hospital Summary: 78 y.o. female whom presented on 6/15/2019 with      Assessment / Plan:  Recurrent fall, POA suspect due to dehydration and lethargy from hypercapnia  LEOLA on CKD stage 3, POA  Cr baseline 1.7, now 2.65. Suspect dehydration  Lethargy with mild b/l hand asterixis due to hypercapnia, POA    Chronic respiratory failure on 2L with hypercapnia  Hx of Tobacco abuse (quit 3 months ago)  ? GURPREET  Daughter reports home sleep study negative for sleep apnea but it is unclear wether the pt and the daughter are referring to overnight pulse oximetry  - appreciate pulmonology input; recommend complete PFT as outpatient     Wean O2 as long as O2 sat is greater than or equal to 93%    Continue with home Spiriva and Breo    duoneb tmt as need    ABG 7.33/62/125/33 on admission, required BIPAP overnight, currently at 2L via n/c. No CPAP/BIPAP at home    CXR (-)    Head CT (-)  - check orthostatic BP, work with PT/OT    Oral candiasis  - nystatin swish and spit    Chronic combined diastolic and systolic chf, POA  Grade 1 diastolic chf 9027, EF 71% on stress test   CAD hx stent  Cardiomyopathy  - Echo to be done  - continue ASA, coreg, plavix, imdur    DM type 2  - check A1C in am    Continue with lantus, tradjenta     Check qac/qhs blood glucose and follow SSI    LEOLA on CKD  - creat trending down; 1.49 from 2.65    Avoid nephrotoxic agents    Hypokalemia  - replaced; will recheck this afternoon and follow in am     Morbid obesity  Body mass index is 37.11 kg/m². Code:  DNR/DNI confirmed with patient and daughter present  DVT prophylaxis: heparin  Surrogate decision maker:  Daughter Abbi Huggins    Recommended Disposition:TBD     Subjective:     Chief Complaint / Reason for Physician Visit  \"I don't know what happened, I got weak and I fell\". Discussed with RN events overnight.      Review of Systems:  Symptom Y/N Comments  Symptom Y/N Comments   Fever/Chills n   Chest Pain n    Poor Appetite    Edema     Cough    Abdominal Pain     Sputum    Joint Pain     SOB/LOVE n   Pruritis/Rash     Nausea/vomit n   Tolerating PT/OT     Diarrhea    Tolerating Diet     Constipation    Other       Could NOT obtain due to:      Objective:     VITALS:   Last 24hrs VS reviewed since prior progress note. Most recent are:  Patient Vitals for the past 24 hrs:   Temp Pulse Resp BP SpO2   06/16/19 0833 -- 73 14 -- 99 %   06/16/19 0832 -- -- -- 121/58 --   06/16/19 0831 98.2 °F (36.8 °C) 74 13 121/58 99 %   06/16/19 0320 97.9 °F (36.6 °C) 67 16 128/53 99 %   06/16/19 0257 -- -- -- -- 98 %   06/15/19 2305 98.1 °F (36.7 °C) 67 15 107/46 100 %   06/15/19 2252 -- -- -- -- 99 %   06/15/19 2000 98 °F (36.7 °C) 66 14 118/51 100 %   06/15/19 1854 96.6 °F (35.9 °C) 76 16 -- 100 %   06/15/19 1808 -- 70 17 -- 98 %   06/15/19 1758 -- -- -- -- 96 %   06/15/19 1653 -- 71 13 -- 100 %   06/15/19 1645 -- 71 16 106/59 98 %   06/15/19 1630 -- 74 22 -- 99 %   06/15/19 1545 -- 72 16 128/75 98 %   06/15/19 1532 -- -- -- -- 98 %   06/15/19 1442 -- 72 14 -- 99 %   06/15/19 1430 -- 72 15 113/63 96 %   06/15/19 1425 -- 73 14 -- 100 %   06/15/19 1400 -- 73 16 110/51 100 %   06/15/19 1345 -- 74 18 113/57 100 %   06/15/19 1333 -- 78 -- 118/63 99 %   06/15/19 1331 -- 82 15 119/58 100 %   06/15/19 1327 -- 72 16 130/64 100 %   06/15/19 1247 -- 73 -- 126/69 97 %   06/15/19 1226 -- 75 15 -- 100 %   06/15/19 1224 -- -- -- -- 99 %   06/15/19 1220 -- 72 -- -- 95 %   06/15/19 1215 -- 73 -- 105/60 100 %   06/15/19 1209 98.5 °F (36.9 °C) 71 18 105/60 99 %       Intake/Output Summary (Last 24 hours) at 6/16/2019 1149  Last data filed at 6/16/2019 0833  Gross per 24 hour   Intake 1650 ml   Output 2500 ml   Net -850 ml        PHYSICAL EXAM:  General: WD, WN. Alert, cooperative, no acute distress    EENT:  EOMI. Anicteric sclerae.  MMM, (+) white patches on tongue and spots on posterior pharynx  Resp:  Clear in apex with decreased breath sounds at bases, no wheezing or rales. No accessory muscle use  CV:  Regular  rhythm,  No edema  GI:  Soft, Non distended, Non tender.  +Bowel sounds  Neurologic:  Alert and oriented X 3, normal speech,   Psych:   Good insight. Not anxious nor agitated  Skin:  No rashes. No jaundice    Reviewed most current lab test results and cultures  YES  Reviewed most current radiology test results   YES  Review and summation of old records today    NO  Reviewed patient's current orders and MAR    YES  PMH/SH reviewed - no change compared to H&P  ________________________________________________________________________  Care Plan discussed with:    Comments   Patient y    Family  y Daughters are bedside   RN y    Care Manager     Consultant                        Multidiciplinary team rounds were held today with , nursing, pharmacist and clinical coordinator. Patient's plan of care was discussed; medications were reviewed and discharge planning was addressed. ________________________________________________________________________  Total NON critical care TIME:  35 Minutes    Total CRITICAL CARE TIME Spent:   Minutes non procedure based      Comments   >50% of visit spent in counseling and coordination of care     ________________________________________________________________________  Xander , NP     Procedures: see electronic medical records for all procedures/Xrays and details which were not copied into this note but were reviewed prior to creation of Plan. LABS:  I reviewed today's most current labs and imaging studies.   Pertinent labs include:  Recent Labs     06/16/19  0346 06/15/19  1246   WBC 5.7 7.9   HGB 9.5* 11.4*   HCT 30.2* 38.3    199     Recent Labs     06/16/19  0346 06/15/19  1324 06/15/19  1246   *  --  134*   K 2.4*  --  3.3*   CL 97  --  92*   CO2 32  --  34*   *  --  248*   BUN 54*  -- 74*   CREA 1.49*  --  2.65*   CA 8.4*  --  8.9   MG 2.1  --  2.2   PHOS 4.6  --  5.5*   ALB  --   --  3.9   TBILI  --   --  0.6   SGOT  --   --  59*   ALT  --   --  63   INR  --  1.0  --        Signed: )Ashley Chavez, NP

## 2019-06-16 NOTE — PROGRESS NOTES
Re: Heparin sq  (P&T/MEC approved dose change has been made on this patient)  Indication: VTE prevention  Crcl: 25 ml/min  Ordered dose: 5000 unit sq q 8h  MEC ordered dose change: 96629 units sq q 8h for BMI > 40 per protocol    Thanks,  Marycruz Worrell, [facility]

## 2019-06-16 NOTE — CONSULTS
IMPRESSION:   1. Recurrent falls- unclear etiology but I suspect multifactoral and in part due to generalized weakness, dehydration. No obvious neurologic or cardiac explanation   2. COPD- unclear diagnosis. No acute exacerbation. PFT from 2015 w/o obstruction. Not followed by pulm. Still smoking and complaint with Spiriva  3. Chronic hypoxic respiratory failure- remains on 2 liters at home  4. ?GURPREET-per pt recent home sleep study negative but unclear if test was overnight oximetry  5. LEOLA on CKD- likely prerenal. Suspect an element of hypovolemia  6. H/o CHF, DM, HTN, obesity        RECOMMENDATIONS/PLAN:   1. Wean supplemental O2 as tolerated to maintain SpO2 >90%  2. Cont home Spiriva  3. ICS/LABA for now- do not think she needs it upon discharge  4. PRN nebs  5. Trend function closely  6. Gentle hydration  7. PT/OT evaluation pending  8. Discussed importance of smoking cessation    Pt will benefit from outpt pulmonary evaluation to repeat PFTs and discuss ongoing smoking avoidance. My assessment/management discussed with: Consultants, Nursing, Pharmacy, Case Management, PT, OT, Respiratory Therapy, Hospitalist and Family for coordination of care          Subjective/Initial History:   I have reviewed the flowsheet and previous days notes. Seen earlier today on rounds. I was asked by Talisha Lauren MD to see Nik Blood a 78 y.o.  female  in consultation for a chief complaint of COPD      79 yo F with h/o DM, CHF, ?COPD, chronic hypoxic respiratory failure and tobacco abuse presents after a fall. Per the son the pt has been dealing with recurrent falls in the last few months. She fell yesterday at home but had no LOC, palpitations, CP, SOB. She lives alone in [address] and is fairly sedentary due to back pain and generalized weakness. She was apparently diagnosed with COPD years ago and was started on Spiriva. She smokes <1/2 PPD for the last 50 years.  She rarely wheezes, coughs and feels SOB although she does not ambulate far distances. Upon arrival CXR was clear as was a HCT/ Cardiac enzymes are wnl. Her Cr slightly elevated from baseline. She was started on Breo and Incruse and pulm was consulted. PMH:  has a past medical history of Asthma, CAD (coronary artery disease), Chronic kidney disease, Chronic obstructive pulmonary disease ([facility] [address] H/O acute myocardial infarction, Heart failure ([facility] [address] and Hypertension. PSH:   has a past surgical history that includes hx gyn and hx cholecystectomy. FHX: family history includes Diabetes in her mother; Heart Attack in her mother. SHX:  reports that she quit smoking about 6 months ago. She has never used smokeless tobacco. She reports that she does not drink alcohol or use drugs. ROS:A comprehensive review of systems was negative except for that written in the HPI.     Allergies   Allergen Reactions    Garlic Shortness of Breath    Metformin Nausea and Vomiting    Pcn [Penicillins] Hives        MAR reviewed and pertinent medications noted or modified as needed  MEDS:   Current Facility-Administered Medications   Medication    nystatin (MYCOSTATIN) 100,000 unit/mL oral suspension 500,000 Units    albuterol (PROVENTIL VENTOLIN) nebulizer solution 2.5 mg    aspirin delayed-release tablet 81 mg    atorvastatin (LIPITOR) tablet 80 mg    benzonatate (TESSALON) capsule 100 mg    fluticasone-vilanterol (BREO ELLIPTA) 100mcg-25mcg/puff    carvedilol (COREG) tablet 6.25 mg    clopidogrel (PLAVIX) tablet 75 mg    ferrous sulfate tablet 325 mg    gabapentin (NEURONTIN) capsule 300 mg    insulin glargine (LANTUS) injection 30 Units    isosorbide mononitrate ER (IMDUR) tablet 30 mg    montelukast (SINGULAIR) tablet 10 mg    linagliptin (TRADJENTA) tablet 5 mg    umeclidinium (INCRUSE ELLIPTA) 62.5 mcg/actuation    traMADol (ULTRAM) tablet 50 mg    insulin lispro (HUMALOG) injection    glucose chewable tablet 16 g  glucagon (GLUCAGEN) injection 1 mg    dextrose 10% infusion 125-250 mL    heparin (porcine) injection 5,000 Units        Objective:     Vital Signs: Telemetry:    normal sinus rhythm Intake/Output:   Visit Vitals  /50 (BP 1 Location: Left arm, BP Patient Position: At rest)   Pulse 82   Temp 98.1 °F (36.7 °C)   Resp 18   Ht 5' (1.524 m)   Wt 96 kg (211 lb 10.3 oz)   SpO2 99%   BMI 41.33 kg/m²       Temp (24hrs), Av.8 °F (36.6 °C), Min:96.6 °F (35.9 °C), Max:98.2 °F (36.8 °C)        O2 Device: BIPAP O2 Flow Rate (L/min): 2 l/min       Wt Readings from Last 4 Encounters:   19 96 kg (211 lb 10.3 oz)   19 99.8 kg (220 lb)   18 98 kg (216 lb)   18 97.5 kg (215 lb)          Intake/Output Summary (Last 24 hours) at 2019 1304  Last data filed at 2019 0833  Gross per 24 hour   Intake 1650 ml   Output 2500 ml   Net -850 ml       Last shift:       07 -  1900  In: 240 [P.O.:240]  Out: 200 [Urine:200]  Last 3 shifts: 1901 -  0700  In: 1410 [P.O.:410; I.V.:1000]  Out: 2300 [Urine:2300]     Hemodynamics:    CO:    CI:    CVP:    SVR:   PAP Systolic:    PAP Diastolic:    PVR:    IP29:        Ventilator Settings:      Mode Rate TV Press PEEP FiO2 PIP Min. Vent               28 %     6.7 l/min      Physical Exam:    Gen: AAO3, NAD  Neck: thick  Cardiac: no murmus  Lungs: Clear b/l  Abd: soft  Ext: trace edema  Neuro: AAO3, strength 5/5 throughout    Data:   Interval lab and diagnostic data was reviewed. Interval radiology images were independently viewed and available reports were reviewed. All lab results for the last 24 hours reviewed.           Labs:    Recent Labs     19  0346 06/15/19  1324 06/15/19  1246   WBC 5.7  --  7.9   HGB 9.5*  --  11.4*     --  199   INR  --  1.0  --      Recent Labs     19  0346 06/15/19  1246   * 134*   K 2.4* 3.3*   CL 97 92*   CO2 32 34*   * 248*   BUN 54* 74*   CREA 1.49* 2.65*   CA 8.4* 8.9   MG 2.1 2.2   PHOS 4.6 5.5*   ALB  --  3.9   SGOT  --  59*   ALT  --  63     No results for input(s): PH, PCO2, PO2, HCO3, FIO2 in the last 72 hours. Recent Labs     06/15/19  1246   *   TROIQ <0.05     Lab Results   Component Value Date/Time     (H) 03/12/2016 05:10 AM      Lab Results   Component Value Date/Time    Culture result: (A) 06/12/2019 11:45 AM     STAPHYLOCOCCUS SPECIES, COAGULASE NEGATIVE GROWING IN 1 OF 4 BOTTLES DRAWN (NO. 2 RT ARM SITE)    Culture result: (A) 06/12/2019 11:45 AM     PRELIMINARY REPORT OF GRAM POSITIVE COCCI IN CLUSTERS GROWING IN 1 OF 4 BOTTLES DRAWN CALLED TO AND READ BACK BY MR CASPER RN ON 6/13/19 AT [address] [facility], [facility] ED). MS    Culture result: REMAINING BOTTLE(S) HAS/HAVE NO GROWTH SO FAR 06/12/2019 11:45 AM     No results found for: TSH, TSHEXT     Imaging:  I have personally reviewed the patients radiographs and have reviewed the reports:        Results from Hospital Encounter encounter on 06/15/19   XR CHEST PORT    Narrative EXAM: XR CHEST PORT    INDICATION: weakness    COMPARISON: 6/12/2019    FINDINGS: A portable AP radiograph of the chest was obtained at 1218 hours. There is no pneumothorax or pleural effusion. The lungs are clear. Cardiac size  is normal. There is mild thoracic aortic arch tortuosity with otherwise normal  mediastinal and hilar contour. The bones and soft tissues are grossly within  normal limits. Impression IMPRESSION: No acute findings. Results from Hospital Encounter encounter on 06/15/19   CT HEAD WO CONT    Narrative EXAMINATION:  CT HEAD WO CONT    CLINICAL INFORMATION:  Head trauma, closed, mild, GCS >= 13, no risk factors,  neuro exam normal  COMPARISON:  6/12/2019   TECHNIQUE: Routine axial head CT was performed. IV contrast was not  administered. Sagittal and coronal reconstructions were generated.     CT dose reduction was achieved through use of a standardized protocol tailored  for this examination and automatic exposure control for dose modulation. FINDINGS:  No acute infarct, hemorrhage or mass. VENTRICULAR SYSTEM:  Normal for age. BASAL CISTERNS:  Patent. BRAIN PARENCHYMA:  Unchanged chronic lacunar infarct in the right corpus  striatum. MIDLINE SHIFT:  None. CALVARIUM/ SKULL BASE: Intact. PARANASAL SINUSES AND MASTOID AIR CELLS: Clear. VISUALIZED ORBITS: No significant abnormalities. SELLA: No enlargement. Impression IMPRESSION:  No acute findings and no change since 6/12/2019. This care involved high complexity decision making which includes independently reviewing the patient's past medical records, current laboratory results, medication profiles that were immediately available to me and actual Xray images at the bedside in order to assess, support vital system function, and to treat this degree of vital organ system failure, and to prevent further life threatening deterioration of the patients condition. I was in direct communication with the nursing staff throughout this time. I have personally and independently reviewed the patients interval diagnostic lab data, radiographs and the reports. I have ordered additional labs to follow the current medical conditions and to monitor treatment responses over the next 24 hours or sooner if needed. I will order additional imaging to follow longitudinal changes found on the most current imaging.        Medical Decision Making Today  · Reviewed the flowsheet and previous days notes  · Reviewed and summarized records or history from previous days note or discussions with staff, family  · Parenteral controlled substances - Reviewed/ Adjusted / Linda Koyanagi / Started  · High Risk Drug therapy requiring intensive monitoring for toxicity: eg steroids, pressors, antibiotics  · Review and order of Clinical lab tests  · Review and Order of Radiology tests  · Review and Order of Medicine tests  · Independent visualization of radiologic Images  · Reviewed Ventilator / NiPPV  · I have personally reviewed the patients ECG / Telemetry  · Abrupt change in neurologic status  · Diagnostic endoscopies with identified risk factors  ·          Thank you for allowing us to participate in the care of this patient. We will be happy to follow along with you.     Vlad Pina, DO

## 2019-06-17 ENCOUNTER — APPOINTMENT (OUTPATIENT)
Dept: NON INVASIVE DIAGNOSTICS | Age: 69
DRG: 683 | End: 2019-06-17
Attending: NURSE PRACTITIONER
Payer: MEDICARE

## 2019-06-17 VITALS
WEIGHT: 211 LBS | TEMPERATURE: 98.3 F | HEIGHT: 60 IN | OXYGEN SATURATION: 99 % | HEART RATE: 84 BPM | BODY MASS INDEX: 41.43 KG/M2 | DIASTOLIC BLOOD PRESSURE: 55 MMHG | RESPIRATION RATE: 20 BRPM | SYSTOLIC BLOOD PRESSURE: 122 MMHG

## 2019-06-17 PROBLEM — Z72.0 TOBACCO ABUSE: Status: ACTIVE | Noted: 2019-06-17

## 2019-06-17 LAB
ANION GAP SERPL CALC-SCNC: 4 MMOL/L (ref 5–15)
ANION GAP SERPL CALC-SCNC: 5 MMOL/L (ref 5–15)
BACTERIA SPEC CULT: NORMAL
BASOPHILS # BLD: 0 K/UL (ref 0–0.1)
BASOPHILS NFR BLD: 1 % (ref 0–1)
BUN SERPL-MCNC: 34 MG/DL (ref 6–20)
BUN SERPL-MCNC: 43 MG/DL (ref 6–20)
BUN/CREAT SERPL: 25 (ref 12–20)
BUN/CREAT SERPL: 29 (ref 12–20)
CALCIUM SERPL-MCNC: 8.1 MG/DL (ref 8.5–10.1)
CALCIUM SERPL-MCNC: 8.4 MG/DL (ref 8.5–10.1)
CC UR VC: NORMAL
CHLORIDE SERPL-SCNC: 97 MMOL/L (ref 97–108)
CHLORIDE SERPL-SCNC: 99 MMOL/L (ref 97–108)
CO2 SERPL-SCNC: 34 MMOL/L (ref 21–32)
CO2 SERPL-SCNC: 34 MMOL/L (ref 21–32)
CREAT SERPL-MCNC: 1.35 MG/DL (ref 0.55–1.02)
CREAT SERPL-MCNC: 1.46 MG/DL (ref 0.55–1.02)
DIFFERENTIAL METHOD BLD: ABNORMAL
ECHO AV CUSP MM: 1.38 CM
ECHO AV PEAK GRADIENT: 4.9 MMHG
ECHO AV PEAK VELOCITY: 111.16 CM/S
ECHO LA AREA 2C: 14.45 CM2
ECHO LA AREA 4C: 16.9 CM2
ECHO LA MAJOR AXIS: 2.92 CM
ECHO LA VOL 2C: 33.95 ML (ref 22–52)
ECHO LA VOL 4C: 52.39 ML (ref 22–52)
ECHO LA VOL BP: 44.5 ML (ref 22–52)
ECHO LA VOL/BSA BIPLANE: 23.3 ML/M2 (ref 16–28)
ECHO LA VOLUME INDEX A2C: 17.78 ML/M2 (ref 16–28)
ECHO LA VOLUME INDEX A4C: 27.44 ML/M2 (ref 16–28)
ECHO LV INTERNAL DIMENSION DIASTOLIC: 5.8 CM (ref 3.9–5.3)
ECHO LV INTERNAL DIMENSION SYSTOLIC: 4.67 CM
ECHO LV IVSD: 1.11 CM (ref 0.6–0.9)
ECHO LV IVSS: 1.39 CM
ECHO LV MASS 2D: 346.9 G (ref 67–162)
ECHO LV MASS INDEX 2D: 181.7 G/M2 (ref 43–95)
ECHO LV POSTERIOR WALL DIASTOLIC: 1.24 CM (ref 0.6–0.9)
ECHO LV POSTERIOR WALL SYSTOLIC: 1.6 CM
ECHO LVOT PEAK GRADIENT: 3.3 MMHG
ECHO LVOT PEAK VELOCITY: 90.84 CM/S
ECHO MV A VELOCITY: 76.61 CM/S
ECHO MV AREA PHT: 3.1 CM2
ECHO MV E DECELERATION TIME (DT): 247.2 MS
ECHO MV E VELOCITY: 60.59 CM/S
ECHO MV E/A RATIO: 0.79
ECHO MV PRESSURE HALF TIME (PHT): 71.7 MS
ECHO MV REGURGITANT PEAK GRADIENT: 7.1 MMHG
ECHO MV REGURGITANT PEAK VELOCITY: 132.85 CM/S
ECHO PV MAX VELOCITY: 67.65 CM/S
ECHO PV PEAK GRADIENT: 1.8 MMHG
ECHO TV REGURGITANT MAX VELOCITY: 234.85 CM/S
ECHO TV REGURGITANT PEAK GRADIENT: 22.1 MMHG
EOSINOPHIL # BLD: 0.3 K/UL (ref 0–0.4)
EOSINOPHIL NFR BLD: 4 % (ref 0–7)
ERYTHROCYTE [DISTWIDTH] IN BLOOD BY AUTOMATED COUNT: 14.4 % (ref 11.5–14.5)
GLUCOSE BLD STRIP.AUTO-MCNC: 121 MG/DL (ref 65–100)
GLUCOSE BLD STRIP.AUTO-MCNC: 254 MG/DL (ref 65–100)
GLUCOSE SERPL-MCNC: 129 MG/DL (ref 65–100)
GLUCOSE SERPL-MCNC: 227 MG/DL (ref 65–100)
HCT VFR BLD AUTO: 30.5 % (ref 35–47)
HGB BLD-MCNC: 9.4 G/DL (ref 11.5–16)
IMM GRANULOCYTES # BLD AUTO: 0.1 K/UL (ref 0–0.04)
IMM GRANULOCYTES NFR BLD AUTO: 1 % (ref 0–0.5)
LYMPHOCYTES # BLD: 1.3 K/UL (ref 0.8–3.5)
LYMPHOCYTES NFR BLD: 20 % (ref 12–49)
MAGNESIUM SERPL-MCNC: 1.9 MG/DL (ref 1.6–2.4)
MCH RBC QN AUTO: 27.2 PG (ref 26–34)
MCHC RBC AUTO-ENTMCNC: 30.8 G/DL (ref 30–36.5)
MCV RBC AUTO: 88.4 FL (ref 80–99)
MONOCYTES # BLD: 0.5 K/UL (ref 0–1)
MONOCYTES NFR BLD: 8 % (ref 5–13)
NEUTS SEG # BLD: 4.2 K/UL (ref 1.8–8)
NEUTS SEG NFR BLD: 66 % (ref 32–75)
NRBC # BLD: 0 K/UL (ref 0–0.01)
NRBC BLD-RTO: 0 PER 100 WBC
PLATELET # BLD AUTO: 163 K/UL (ref 150–400)
PMV BLD AUTO: 11.6 FL (ref 8.9–12.9)
POTASSIUM SERPL-SCNC: 2.6 MMOL/L (ref 3.5–5.1)
POTASSIUM SERPL-SCNC: 3.4 MMOL/L (ref 3.5–5.1)
RBC # BLD AUTO: 3.45 M/UL (ref 3.8–5.2)
SERVICE CMNT-IMP: ABNORMAL
SERVICE CMNT-IMP: ABNORMAL
SERVICE CMNT-IMP: NORMAL
SODIUM SERPL-SCNC: 136 MMOL/L (ref 136–145)
SODIUM SERPL-SCNC: 137 MMOL/L (ref 136–145)
WBC # BLD AUTO: 6.4 K/UL (ref 3.6–11)

## 2019-06-17 PROCEDURE — 74011250637 HC RX REV CODE- 250/637: Performed by: HOSPITALIST

## 2019-06-17 PROCEDURE — 74011250637 HC RX REV CODE- 250/637: Performed by: NURSE PRACTITIONER

## 2019-06-17 PROCEDURE — 80048 BASIC METABOLIC PNL TOTAL CA: CPT

## 2019-06-17 PROCEDURE — 74011250636 HC RX REV CODE- 250/636: Performed by: EMERGENCY MEDICINE

## 2019-06-17 PROCEDURE — 36415 COLL VENOUS BLD VENIPUNCTURE: CPT

## 2019-06-17 PROCEDURE — 83735 ASSAY OF MAGNESIUM: CPT

## 2019-06-17 PROCEDURE — 97165 OT EVAL LOW COMPLEX 30 MIN: CPT

## 2019-06-17 PROCEDURE — 93306 TTE W/DOPPLER COMPLETE: CPT

## 2019-06-17 PROCEDURE — 74011636637 HC RX REV CODE- 636/637: Performed by: HOSPITALIST

## 2019-06-17 PROCEDURE — 77010033678 HC OXYGEN DAILY

## 2019-06-17 PROCEDURE — 97116 GAIT TRAINING THERAPY: CPT

## 2019-06-17 PROCEDURE — 74011250636 HC RX REV CODE- 250/636: Performed by: HOSPITALIST

## 2019-06-17 PROCEDURE — 97161 PT EVAL LOW COMPLEX 20 MIN: CPT

## 2019-06-17 PROCEDURE — 97535 SELF CARE MNGMENT TRAINING: CPT

## 2019-06-17 PROCEDURE — 82962 GLUCOSE BLOOD TEST: CPT

## 2019-06-17 PROCEDURE — 85025 COMPLETE CBC W/AUTO DIFF WBC: CPT

## 2019-06-17 RX ORDER — NYSTATIN 100000 [USP'U]/ML
500000 SUSPENSION ORAL 4 TIMES DAILY
Qty: 120 ML | Refills: 0 | Status: SHIPPED | OUTPATIENT
Start: 2019-06-17 | End: 2019-06-23

## 2019-06-17 RX ORDER — SORBITOL SOLUTION 70 %
30 SOLUTION, ORAL MISCELLANEOUS ONCE
Status: COMPLETED | OUTPATIENT
Start: 2019-06-17 | End: 2019-06-17

## 2019-06-17 RX ORDER — POTASSIUM CHLORIDE 20 MEQ/1
40 TABLET, EXTENDED RELEASE ORAL
Status: COMPLETED | OUTPATIENT
Start: 2019-06-17 | End: 2019-06-17

## 2019-06-17 RX ORDER — POTASSIUM CHLORIDE 29.8 MG/ML
20 INJECTION INTRAVENOUS ONCE
Status: DISCONTINUED | OUTPATIENT
Start: 2019-06-17 | End: 2019-06-17

## 2019-06-17 RX ORDER — FLUTICASONE PROPIONATE AND SALMETEROL 500; 50 UG/1; UG/1
2 POWDER RESPIRATORY (INHALATION) 2 TIMES DAILY
COMMUNITY
End: 2019-06-17

## 2019-06-17 RX ORDER — POTASSIUM CHLORIDE 750 MG/1
10 TABLET, FILM COATED, EXTENDED RELEASE ORAL
Status: COMPLETED | OUTPATIENT
Start: 2019-06-17 | End: 2019-06-17

## 2019-06-17 RX ORDER — POTASSIUM CHLORIDE 7.45 MG/ML
10 INJECTION INTRAVENOUS
Status: COMPLETED | OUTPATIENT
Start: 2019-06-17 | End: 2019-06-17

## 2019-06-17 RX ORDER — POTASSIUM CHLORIDE 750 MG/1
20 TABLET, FILM COATED, EXTENDED RELEASE ORAL
Status: COMPLETED | OUTPATIENT
Start: 2019-06-17 | End: 2019-06-17

## 2019-06-17 RX ORDER — POTASSIUM CHLORIDE 14.9 MG/ML
10 INJECTION INTRAVENOUS
Status: DISCONTINUED | OUTPATIENT
Start: 2019-06-17 | End: 2019-06-17

## 2019-06-17 RX ADMIN — POTASSIUM CHLORIDE 20 MEQ: 750 TABLET, FILM COATED, EXTENDED RELEASE ORAL at 09:09

## 2019-06-17 RX ADMIN — GABAPENTIN 300 MG: 300 CAPSULE ORAL at 09:10

## 2019-06-17 RX ADMIN — POTASSIUM CHLORIDE 40 MEQ: 20 TABLET, EXTENDED RELEASE ORAL at 05:15

## 2019-06-17 RX ADMIN — UMECLIDINIUM 1 PUFF: 62.5 AEROSOL, POWDER ORAL at 09:16

## 2019-06-17 RX ADMIN — FERROUS SULFATE TAB 325 MG (65 MG ELEMENTAL FE) 325 MG: 325 (65 FE) TAB at 09:10

## 2019-06-17 RX ADMIN — ASPIRIN 81 MG: 81 TABLET ORAL at 09:08

## 2019-06-17 RX ADMIN — CLOPIDOGREL BISULFATE 75 MG: 75 TABLET ORAL at 09:08

## 2019-06-17 RX ADMIN — FLUTICASONE FUROATE AND VILANTEROL TRIFENATATE 1 PUFF: 100; 25 POWDER RESPIRATORY (INHALATION) at 11:34

## 2019-06-17 RX ADMIN — INSULIN LISPRO 5 UNITS: 100 INJECTION, SOLUTION INTRAVENOUS; SUBCUTANEOUS at 09:10

## 2019-06-17 RX ADMIN — POTASSIUM CHLORIDE 10 MEQ: 10 INJECTION, SOLUTION INTRAVENOUS at 09:10

## 2019-06-17 RX ADMIN — LINAGLIPTIN 5 MG: 5 TABLET, FILM COATED ORAL at 09:09

## 2019-06-17 RX ADMIN — POTASSIUM CHLORIDE 10 MEQ: 10 INJECTION, SOLUTION INTRAVENOUS at 06:31

## 2019-06-17 RX ADMIN — ATORVASTATIN CALCIUM 80 MG: 40 TABLET, FILM COATED ORAL at 09:09

## 2019-06-17 RX ADMIN — ISOSORBIDE MONONITRATE 30 MG: 30 TABLET, EXTENDED RELEASE ORAL at 09:09

## 2019-06-17 RX ADMIN — POTASSIUM CHLORIDE 10 MEQ: 750 TABLET, FILM COATED, EXTENDED RELEASE ORAL at 13:52

## 2019-06-17 RX ADMIN — CARVEDILOL 6.25 MG: 6.25 TABLET, FILM COATED ORAL at 09:09

## 2019-06-17 RX ADMIN — TRAMADOL HYDROCHLORIDE 50 MG: 50 TABLET, FILM COATED ORAL at 01:08

## 2019-06-17 RX ADMIN — NYSTATIN 500000 UNITS: 100000 SUSPENSION ORAL at 09:09

## 2019-06-17 RX ADMIN — SORBITOL SOLUTION (BULK) 30 ML: 70 SOLUTION at 11:33

## 2019-06-17 RX ADMIN — HEPARIN SODIUM 7500 UNITS: 5000 INJECTION INTRAVENOUS; SUBCUTANEOUS at 09:09

## 2019-06-17 NOTE — DISCHARGE INSTRUCTIONS
Patient Discharge Instructions     Pt Name  Edith Zambrano   Date of Birth 1950   Age  78 y.o. Medical Record Number  [id number]   PCP Shannan Avelar MD    Admit date:  6/15/2019 @    [place] [address]    Room Number  2275/01   Date of Discharge 6/17/2019     Admission Diagnoses:     Respiratory failure ([facility] [address]          Allergies   Allergen Reactions    Garlic Shortness of Breath    Metformin Nausea and Vomiting    Pcn [Penicillins] Hives        You were admitted to [address] for  Respiratory failure ([facility] [address]    YOUR OTHER MEDICAL DIAGNOSES INCLUDE (BUT NOT LIMITED TO ):  Present on Admission:   LEOLA (acute kidney injury) ([facility] [address]   Recurrent falls   Acute respiratory failure ([facility] [address]   CAD (coronary artery disease)   Hypokalemia   CKD (chronic kidney disease) stage 3, GFR 30-59 ml/min ([facility]   HTN (hypertension)   Respiratory failure ([facility] [address]   Tobacco abuse      DIET:  Diabetic Diet       Recommended activity: Activity as tolerated  Follow up : Follow-up Information     Follow up With Specialties Details Why Contact Info      Schedule an appointment as soon as possible for a visit      OtherShannan MD    Patient can only remember the practice name and not the physician          Please follow up with [facility] provider for your kidney function and potassium level in one week. · It is important that you take the medication exactly as they are prescribed. · Keep your medication in the bottles provided by the pharmacist and keep a list of the medication names, dosages, and times to be taken in your wallet. · Do not take other medications without consulting your doctor.        ADDITIONAL INFORMATION: If you experience any of the following symptoms or have any health problem not listed below, then please call your primary care physician or return to the emergency room if you cannot get hold of your doctor: Fever, chills, nausea, vomiting, diarrhea, change in mentation, falling, bleeding, shortness of breath. I understand that if any problems occur once I am discharged, I am supposed to call my Primary care physician for further care or seek help in the Emergency Department at the nearest Healthcare facility. I have had an opportunity to discuss my clinical issues with my doctor and nursing staff. I understand and acknowledge receipt of the above instructions.                                                                                                                                            Physician's or R.N.'s Signature                                                            Date/Time                                                                                                                                              Patient or Representative Signature                                                 Date/Time

## 2019-06-17 NOTE — PROGRESS NOTES
IMPRESSION:   1. Recurrent falls- unclear etiology but I suspect multifactoral and in part due to generalized weakness, dehydration. No obvious neurologic, cardiac or pulmonary explanation   2. \"COPD\" - PFT from 2015 w/o obstruction. Not followed by pulm (treated by [facility], switching to Pulse)  3. Chronic hypoxic respiratory failure- remains on 2 liters at home  4. ?GURPREET-was told she did not have GURPREET based on overnight oximetry by [facility] sleep referral  5. LEOLA on CKD- likely prerenal. Suspect an element of hypovolemia  6. H/o CHF, DM, HTN, obesity  7. H/O tobacco use, quit 3 months ago      RECOMMENDATIONS/PLAN:   1. O2 at baseline  2. Cont home Spiriva  3. Trend function closely  4. Gentle hydration  5. PT/OT evaluation pending  6. Discussed importance of smoking cessation  7. Follow up with PCP. Could be seen in sleep clinic if her PCP desires. 8. Will be available PRN          My assessment/management discussed with: Consultants, Nursing, Pharmacy, Case Management, PT, OT, Respiratory Therapy, Hospitalist and Family for coordination of care          Subjective/Initial History:   I have reviewed the flowsheet and previous days notes. Seen earlier today on rounds. I was asked by Juleit Gauthier MD to see Hilary Escoto a 78 y.o.  female  in consultation for a chief complaint of COPD      77 yo F with h/o DM, CHF, ?COPD, chronic hypoxic respiratory failure and tobacco abuse presents after a fall. Per the son the pt has been dealing with recurrent falls in the last few months. She fell yesterday at home but had no LOC, palpitations, CP, SOB. She lives alone in [place] and is fairly sedentary due to back pain and generalized weakness. She was apparently diagnosed with COPD years ago and was started on Spiriva. She smokes <1/2 PPD for the last 50 years. She rarely wheezes, coughs and feels SOB although she does not ambulate far distances.  Upon arrival CXR was clear as was a HCT/ Cardiac enzymes are wnl. Her Cr slightly elevated from baseline. She was started on Breo and Incruse and pulm was consulted. PMH:  has a past medical history of Asthma, CAD (coronary artery disease), Chronic kidney disease, Chronic obstructive pulmonary disease ([address] H/O acute myocardial infarction, Heart failure ([facility] [address] and Hypertension. PSH:   has a past surgical history that includes hx gyn and hx cholecystectomy. FHX: family history includes Diabetes in her mother; Heart Attack in her mother. SHX:  reports that she quit smoking about 6 months ago. She has never used smokeless tobacco. She reports that she does not drink alcohol or use drugs. ROS:A comprehensive review of systems was negative except for that written in the HPI.     Allergies   Allergen Reactions    Garlic Shortness of Breath    Metformin Nausea and Vomiting    Pcn [Penicillins] Hives        MAR reviewed and pertinent medications noted or modified as needed  MEDS:   Current Facility-Administered Medications   Medication    potassium chloride 10 mEq in 100 ml IVPB    potassium chloride SR (KLOR-CON 10) tablet 20 mEq    nystatin (MYCOSTATIN) 100,000 unit/mL oral suspension 500,000 Units    heparin (porcine) injection 7,500 Units    albuterol (PROVENTIL VENTOLIN) nebulizer solution 2.5 mg    aspirin delayed-release tablet 81 mg    atorvastatin (LIPITOR) tablet 80 mg    benzonatate (TESSALON) capsule 100 mg    fluticasone-vilanterol (BREO ELLIPTA) 100mcg-25mcg/puff    carvedilol (COREG) tablet 6.25 mg    clopidogrel (PLAVIX) tablet 75 mg    ferrous sulfate tablet 325 mg    gabapentin (NEURONTIN) capsule 300 mg    insulin glargine (LANTUS) injection 30 Units    isosorbide mononitrate ER (IMDUR) tablet 30 mg    montelukast (SINGULAIR) tablet 10 mg    linagliptin (TRADJENTA) tablet 5 mg    umeclidinium (INCRUSE ELLIPTA) 62.5 mcg/actuation    traMADol (ULTRAM) tablet 50 mg    insulin lispro (HUMALOG) injection    glucose chewable tablet 16 g    glucagon (GLUCAGEN) injection 1 mg    dextrose 10% infusion 125-250 mL        Objective:     Vital Signs: Telemetry:    normal sinus rhythm Intake/Output:   Visit Vitals  /58 (BP 1 Location: Left arm, BP Patient Position: Standing)   Pulse 83   Temp 98.1 °F (36.7 °C)   Resp 19   Ht 5' (1.524 m)   Wt 96.4 kg (212 lb 8.4 oz)   SpO2 100%   BMI 41.51 kg/m²       Temp (24hrs), Av.1 °F (36.7 °C), Min:97.8 °F (36.6 °C), Max:98.6 °F (37 °C)        O2 Device: Nasal cannula O2 Flow Rate (L/min): 2 l/min       Wt Readings from Last 4 Encounters:   19 96.4 kg (212 lb 8.4 oz)   19 99.8 kg (220 lb)   18 98 kg (216 lb)   18 97.5 kg (215 lb)          Intake/Output Summary (Last 24 hours) at 2019 0826  Last data filed at 2019 0522  Gross per 24 hour   Intake 1610 ml   Output 1150 ml   Net 460 ml       Last shift:      No intake/output data recorded. Last 3 shifts: 06/15 1901 -  0700  In: 2020 [P.O.:2020]  Out: 3450 [Urine:3450]     Hemodynamics:    CO:    CI:    CVP:    SVR:   PAP Systolic:    PAP Diastolic:    PVR:    JB04:        Ventilator Settings:      Mode Rate TV Press PEEP FiO2 PIP Min. Vent               28 %     6.7 l/min      Physical Exam:    Gen: AAO3, NAD  Neck: thick  Cardiac: no murmus  Lungs: Clear b/l  Abd: soft  Ext: trace edema  Neuro: AAO3, strength 5/5 throughout    Data:   Interval lab and diagnostic data was reviewed. Interval radiology images were independently viewed and available reports were reviewed. All lab results for the last 24 hours reviewed.           Labs:    Recent Labs     19  0338 19  0346 06/15/19  1324 06/15/19  1246   WBC 6.4 5.7  --  7.9   HGB 9.4* 9.5*  --  11.4*    164  --  199   INR  --   --  1.0  --      Recent Labs     19  0338 19  0346 06/15/19  1246    135* 134*   K 2.6* 2.4* 3.3*   CL 97 97 92*   CO2 34* 32 34*   * 159* 248*   BUN 43* 54* 74*   CREA 1.46* 1.49* 2.65*   CA 8.1* 8.4* 8.9   MG 1.9 2.1 2.2   PHOS  --  4.6 5.5*   ALB  --   --  3.9   SGOT  --   --  59*   ALT  --   --  63     No results for input(s): PH, PCO2, PO2, HCO3, FIO2 in the last 72 hours. Recent Labs     06/15/19  1246   *   TROIQ <0.05     Lab Results   Component Value Date/Time     (H) 03/12/2016 05:10 AM      Lab Results   Component Value Date/Time    Culture result: (A) 06/12/2019 11:45 AM     STAPHYLOCOCCUS SPECIES, COAGULASE NEGATIVE GROWING IN 1 OF 4 BOTTLES DRAWN (NO. 2 RT ARM SITE)    Culture result: (A) 06/12/2019 11:45 AM     PRELIMINARY REPORT OF GRAM POSITIVE COCCI IN CLUSTERS GROWING IN 1 OF 4 BOTTLES DRAWN CALLED TO AND READ BACK BY MR CASPER RN ON 6/13/19 AT [facility] ED). [place]    Culture result: REMAINING BOTTLE(S) HAS/HAVE NO GROWTH SO FAR 06/12/2019 11:45 AM     No results found for: TSH, TSHEXT, TSHEXT     Imaging:  I have personally reviewed the patients radiographs and have reviewed the reports:        Results from Hospital Encounter encounter on 06/15/19   XR CHEST PORT    Narrative EXAM: XR CHEST PORT    INDICATION: weakness    COMPARISON: 6/12/2019    FINDINGS: A portable AP radiograph of the chest was obtained at 1218 hours. There is no pneumothorax or pleural effusion. The lungs are clear. Cardiac size  is normal. There is mild thoracic aortic arch tortuosity with otherwise normal  mediastinal and hilar contour. The bones and soft tissues are grossly within  normal limits. Impression IMPRESSION: No acute findings. Results from Hospital Encounter encounter on 06/15/19   CT HEAD WO CONT    Narrative EXAMINATION:  CT HEAD WO CONT    CLINICAL INFORMATION:  Head trauma, closed, mild, GCS >= 13, no risk factors,  neuro exam normal  COMPARISON:  6/12/2019   TECHNIQUE: Routine axial head CT was performed. IV contrast was not  administered. Sagittal and coronal reconstructions were generated.     CT dose reduction was achieved through use of a standardized protocol tailored  for this examination and automatic exposure control for dose modulation. FINDINGS:  No acute infarct, hemorrhage or mass. VENTRICULAR SYSTEM:  Normal for age. BASAL CISTERNS:  Patent. BRAIN PARENCHYMA:  Unchanged chronic lacunar infarct in the right corpus  striatum. MIDLINE SHIFT:  None. CALVARIUM/ SKULL BASE: Intact. PARANASAL SINUSES AND MASTOID AIR CELLS: Clear. VISUALIZED ORBITS: No significant abnormalities. SELLA: No enlargement. Impression IMPRESSION:  No acute findings and no change since 6/12/2019. This care involved high complexity decision making which includes independently reviewing the patient's past medical records, current laboratory results, medication profiles that were immediately available to me and actual Xray images at the bedside in order to assess, support vital system function, and to treat this degree of vital organ system failure, and to prevent further life threatening deterioration of the patients condition. I was in direct communication with the nursing staff throughout this time. I have personally and independently reviewed the patients interval diagnostic lab data, radiographs and the reports. I have ordered additional labs to follow the current medical conditions and to monitor treatment responses over the next 24 hours or sooner if needed. I will order additional imaging to follow longitudinal changes found on the most current imaging.        Medical Decision Making Today  · Reviewed the flowsheet and previous days notes  · Reviewed and summarized records or history from previous days note or discussions with staff, family  · Parenteral controlled substances - Reviewed/ Adjusted / Weaned / Started  · High Risk Drug therapy requiring intensive monitoring for toxicity: eg steroids, pressors, antibiotics  · Review and order of Clinical lab tests  · Review and Order of Radiology tests  · Review and Order of Medicine tests  · Independent visualization of radiologic Images  · Reviewed Ventilator / NiPPV  · I have personally reviewed the patients ECG / Telemetry  · Abrupt change in neurologic status  · Diagnostic endoscopies with identified risk factors  ·          Thank you for allowing us to participate in the care of this patient. We will be happy to follow along with you.     Chai Valladares MD

## 2019-06-17 NOTE — PROGRESS NOTES
Problem: Mobility Impaired (Adult and Pediatric)  Goal: *Acute Goals and Plan of Care (Insert Text)  Description  Physical Therapy Goals  Initiated 6/17/2019  1. Patient will move from supine to sit and sit to supine  in bed with supervision/set-up within 7 day(s). 2.  Patient will transfer from bed to chair and chair to bed with supervision/set-up using the least restrictive device within 7 day(s). 3.  Patient will perform sit to stand with supervision/set-up within 7 day(s). 4.  Patient will ambulate with supervision/set-up for 100 feet with the least restrictive device within 7 day(s). Outcome: Progressing Towards Goal   PHYSICAL THERAPY EVALUATION  Patient: Iggy Navarro (85 y.o. female)  Date: 6/17/2019  Primary Diagnosis: LEOLA (acute kidney injury) ([facility] [address] [N17.9]  Recurrent falls [R29.6]        Precautions:       ASSESSMENT :  Based on the objective data described below, the patient presents with general weakness, impaired tolerance of activity ,recent falls, and decreased ability to perform transfers and ambulate. Patient received up in chair and agreeable to participate. Patient lives in a senior apartment, at baseline can ambulate with rollator for household distances but reports recent progressing weakness in LEs and three recent falls. Patient reports she has plans for increased help at home post discharge and will also attend PACE program. Patient is at contact guard level for transfer to  and ambulated with slow steady pace x 20 feet. Patient appeared fatigued but no SOB noted. Patient left up in chair with call bell in reach. Recommend home health therapy post discharge. Patient will benefit from skilled intervention to address the above impairments. Patient?s rehabilitation potential is considered to be Good  Factors which may influence rehabilitation potential include:   ? None noted  ? Mental ability/status  ? Medical condition  ? Home/family situation and support systems  ? Safety awareness  ? Pain tolerance/management  ? Other:      PLAN :  Recommendations and Planned Interventions:  ?           Bed Mobility Training             ? Neuromuscular Re-Education  ? Transfer Training                   ? Orthotic/Prosthetic Training  ? Gait Training                         ? Modalities  ? Therapeutic Exercises           ? Edema Management/Control  ? Therapeutic Activities            ? Patient and Family Training/Education  ? Other (comment):    Frequency/Duration: Patient will be followed by physical therapy  4 times a week to address goals. Discharge Recommendations: Home Health  Further Equipment Recommendations for Discharge: none      SUBJECTIVE:   Patient stated ? My legs just give out sometimes? OBJECTIVE DATA SUMMARY:   HISTORY:    Past Medical History:   Diagnosis Date    Asthma     CAD (coronary artery disease)     Chronic kidney disease     Chronic obstructive pulmonary disease ([facility] [address]     H/O acute myocardial infarction     Heart failure ([facility] [address]     Hypertension      Past Surgical History:   Procedure Laterality Date    HX CHOLECYSTECTOMY      gall stones removed    HX GYN       Prior Level of Function/Home Situation: Patient lives in a senior apartment, at baseline can ambulate with rollator for household distances but reports recent progressing weakness in LEs and three recent falls.    Personal factors and/or comorbidities impacting plan of care:     Home Situation  Home Environment: Apartment(Senior Housing; takes elevator to 2nd floor)  # Steps to Enter: 0  One/Two Story Residence: One story  Living Alone: Yes  Support Systems: Family member(s), Child(jarvis)  Patient Expects to be Discharged to[de-identified] Unknown  Current DME Used/Available at Home: Commode, bedside, Grab bars, Walker, rollator, Shower chair, Adaptive dressing aides(reacher, sock aide, shoe horn)  Tub or Shower Type: Shower    EXAMINATION/PRESENTATION/DECISION MAKING:   Critical Behavior:  Neurologic State: Alert  Orientation Level: Oriented X4  Cognition: Appropriate for age attention/concentration, Follows commands  Safety/Judgement: Fall prevention, Insight into deficits, Home safety  Hearing: Auditory  Auditory Impairment: None  Range Of Motion:  AROM: Generally decreased, functional           PROM: Generally decreased, functional           Strength:    Strength: Generally decreased, functional                    Tone & Sensation:   Tone: Normal              Sensation: Intact               Coordination:  Coordination: Within functional limits  Vision:   Acuity: Within Defined Limits;Able to read clock/calendar on wall without difficulty  Corrective Lenses: Glasses  Functional Mobility:  Bed Mobility:              Transfers:  Sit to Stand: Contact guard assistance;Minimum assistance  Stand to Sit: Contact guard assistance        Bed to Chair: Contact guard assistance              Balance:   Sitting: Intact; Without support  Standing: Impaired; Without support  Standing - Static: Good;Constant support  Standing - Dynamic : Fair;Constant support  Ambulation/Gait Training:  Distance (ft): 20 Feet (ft)  Assistive Device: Gait belt;Walker, rolling  Ambulation - Level of Assistance: Contact guard assistance        Gait Abnormalities: Decreased step clearance        Base of Support: Widened     Speed/Felisha: Pace decreased (<100 feet/min)  Step Length: Left shortened;Right shortened                     Stairs:                Functional Measure:  Barthel Index:    Bathin  Bladder:  0(significant difficulty w/ urinary continence; wears briefs)  Bowels: 5  Groomin  Dressin  Feeding: 10  Mobility: 0  Stairs: 0  Toilet Use: 5  Transfer (Bed to Chair and Back): 10  Total: 40/100       Percentage of impairment   0%   1-19%   20-39%   40-59%   60-79%   80-99%   100%   Barthel Score 0-100 100 99-80 79-60 59-40 20-39 1-19   0     The Barthel ADL Index: Guidelines  1. The index should be used as a record of what a patient does, not as a record of what a patient could do. 2. The main aim is to establish degree of independence from any help, physical or verbal, however minor and for whatever reason. 3. The need for supervision renders the patient not independent. 4. A patient's performance should be established using the best available evidence. Asking the patient, friends/relatives and nurses are the usual sources, but direct observation and common sense are also important. However direct testing is not needed. 5. Usually the patient's performance over the preceding 24-48 hours is important, but occasionally longer periods will be relevant. 6. Middle categories imply that the patient supplies over 50 per cent of the effort. 7. Use of aids to be independent is allowed. Darin Gonzalez., Barthel, D.W. ([id number]). Functional evaluation: the Barthel Index. [address] [facility] (14)2. Amanda Crawford jadiel RAQUEL Batista, Gil Centeno., Vi Eric., Allan Mas, [address] [place] [address] (1999). Measuring the change indisability after inpatient rehabilitation; comparison of the responsiveness of the Barthel Index and Functional [place] Measure. Journal of Neurology, Neurosurgery, and Psychiatry, 66(4), 272-159. Winifred Paige, N.J.A, YORDAN Morales, & Tabitha Staton MRenettaA. (2004.) Assessment of post-stroke quality of life in cost-effectiveness studies: The usefulness of the Barthel Index and the EuroQoL-5D.  Quality of Life Research, 15, 265-75           Physical Therapy Evaluation Charge Determination   History Examination Presentation Decision-Making   MEDIUM  Complexity : 1-2 comorbidities / personal factors will impact the outcome/ POC  MEDIUM Complexity : 3 Standardized tests and measures addressing body structure, function, activity limitation and / or participation in recreation  MEDIUM Complexity : Evolving with changing characteristics  MEDIUM Complexity : FOTO score of 26-74      Based on the above components, the patient evaluation is determined to be of the following complexity level: MEDIUM    Pain:  Pain Scale 1: Numeric (0 - 10)  Pain Intensity 1: 0  Pain Location 1: Back  Pain Orientation 1: Posterior  Pain Description 1: Aching  Pain Intervention(s) 1: Medication (see MAR); Repositioned  Activity Tolerance:   fair  Please refer to the flowsheet for vital signs taken during this treatment. After treatment:   ?         Patient left in no apparent distress sitting up in chair  ? Patient left in no apparent distress in bed  ? Call bell left within reach  ? Nursing notified  ? Caregiver present  ? Bed alarm activated    COMMUNICATION/EDUCATION:   The patient?s plan of care was discussed with: Occupational Therapist and Registered Nurse. ?         Fall prevention education was provided and the patient/caregiver indicated understanding. ? Patient/family have participated as able in goal setting and plan of care. ?         Patient/family agree to work toward stated goals and plan of care. ?         Patient understands intent and goals of therapy, but is neutral about his/her participation. ? Patient is unable to participate in goal setting and plan of care.     Thank you for this referral.  Leoncio Overall, PT   Time Calculation: 30 mins

## 2019-06-17 NOTE — PROGRESS NOTES
Problem: Falls - Risk of  Goal: *Absence of Falls  Description  Document Roselie Garcia Fall Risk and appropriate interventions in the flowsheet. Outcome: Progressing Towards Goal     Problem: Pressure Injury - Risk of  Goal: *Prevention of pressure injury  Description  Document Bryan Scale and appropriate interventions in the flowsheet.   Outcome: Progressing Towards Goal

## 2019-06-17 NOTE — PROGRESS NOTES
Orders received, chart reviewed and patient evaluated by Occupational Therapy. Patient is functioning just below her Mod I baseline using RW, requiring CGA with transfers and standing aspects of LB dressing tasks. Recommend acute skilled OT to maximize Pts safety/independence with performing her dynamic ADL routine using RW, specifically with bathroom mobility and safe item retrieval from high/low surfaces which Pts reports are both areas of concern re: her recent fall Hx. Pt reports she just started attending a PACE day program 2x per week and is in process of coordinating [address] 3x/week when she returns home. Recommend Pt discharge home with [address] assist and [facility].      Full evaluation to follow.      [facility][note 3], OTR/L

## 2019-06-17 NOTE — PROGRESS NOTES
Problem: Self Care Deficits Care Plan (Adult)  Goal: *Acute Goals and Plan of Care (Insert Text)  Description  Occupational Therapy Goals  Initiated 6/17/2019  1. Patient will perform grooming tasks standing at sink with modified independence within 7 day(s). 2.  Patient will perform lower body dressing with modified independence within 7 day(s). 3.  Patient will perform toilet transfers with modified independence using least restrictive device within 7 day(s). 4.  Patient will perform all aspects of toileting with modified independence within 7 day(s). 5.  Patient will perform safe item retrieval from high/low surfaces in preparation for ADL tasks with Mod I using AE PRN/adaptive strategies and least restrictive device within 7 days. Outcome: Progressing Towards Goal   OCCUPATIONAL THERAPY EVALUATION  Patient: Breanna Chaves (45 y.o. female)  Date: 6/17/2019  Primary Diagnosis: LEOLA (acute kidney injury) ([facility] [address] [N17.9]  Recurrent falls [R29.6]        Precautions: Fall Risk     ASSESSMENT :  Based on the objective data described below, the patient presents just below her Mod I baseline with performing her dynamic ADL routine, demonstrating decreased activity tolerance and fear of falling. Vitals remained stable this session. Demonstrates insight into her deficits, yet upon further discussion re: fall Hx, Pt admits to multiple falls while attempting to retrieve items from ground level. Educated Pt on benefits to using seat on Rollator and using reacher for safe item retrieval instead of bending down to pick items up off ground. Overall Pt is performing UB ADLs with Setup Assist and LB ADLs with CGA for standing aspects only. Pt able to don brief and socks with increased time while seated and RBs PRN d/t fatigue. [facility] and [address] assist.    Patient will benefit from skilled intervention to address the above impairments.   Patients rehabilitation potential is considered to be Good  Factors which may influence rehabilitation potential include:   ? None noted  ? Mental ability/status  ? Medical condition  ? Home/family situation and support systems  ? Safety awareness  ? Pain tolerance/management  ? Other:      PLAN :  Recommendations and Planned Interventions:  ?               Self Care Training                  ? Therapeutic Activities  ? Functional Mobility Training    ? Cognitive Retraining  ? Therapeutic Exercises           ? Endurance Activities  ? Balance Training                   ? Neuromuscular Re-Education  ? Visual/Perceptual Training     ? Home Safety Training  ? Patient Education                 ? Family Training/Education  ? Other (comment):    Frequency/Duration: Patient will be followed by occupational therapy 4 times a week to address goals. Discharge Recommendations: HH OT and [address] assist, pending progress in acute skilled OT  Further Equipment Recommendations for Discharge: TBD based on progress; Pt has reacher, sock aide and shoe horn. SUBJECTIVE:   Patient stated My knees will just give out and I fall. . I've fallen all over the place\"    OBJECTIVE DATA SUMMARY:   HISTORY:   Past Medical History:   Diagnosis Date    Asthma     CAD (coronary artery disease)     Chronic kidney disease     Chronic obstructive pulmonary disease ([address]     H/O acute myocardial infarction     [facility] failure [facility] [address]     Hypertension      Past Surgical History:   Procedure Laterality Date    HX CHOLECYSTECTOMY      gall stones removed    HX GYN       Prior Level of Function/Environment/Context: Lives alone in one floor apartment within senior housing complex.  Reports she is trying to coordinate for [address] to assist her 3x/wk and just started attending a PACE day program 2x/wk PTA (PACE picks Pt up from her apartment). Reports Mod I with ADL/IADL tasks at baseline using Rollator. Sits on shower chair to bathe/stands PRN. Wears 2 L/min O2 at baseline. Reports frequent fall Hx, with x3 GLFs PTA. Increased falls when attempting to  items from ground level. Home Situation  Home Environment: Apartment(Senior Housing; takes elevator to 2nd floor)  # Steps to Enter: 0  One/Two Story Residence: One story  Living Alone: Yes  Support Systems: Family member(s), Child(jarvis)  Patient Expects to be Discharged to[de-identified] Unknown  Current DME Used/Available at Home: Commode, bedside, Grab bars, Walker, rollator, Shower chair, Adaptive dressing aides(reacher, sock aide, shoe horn)  Tub or Shower Type: Shower    Hand dominance: Right    EXAMINATION OF PERFORMANCE DEFICITS:  Cognitive/Behavioral Status:  Neurologic State: Alert  Orientation Level: Oriented X4  Cognition: Appropriate for age attention/concentration; Follows commands  Perception: Appears intact  Perseveration: No perseveration noted  Safety/Judgement: Fall prevention; Insight into deficits;Home safety    Skin: Lines intact    Edema: None     Hearing: Auditory  Auditory Impairment: None    Vision/Perceptual:                           Acuity: Within Defined Limits;Able to read clock/calendar on wall without difficulty    Corrective Lenses: Glasses    Range of Motion:  AROM: Generally decreased, functional  PROM: Generally decreased, functional          Strength:    Strength: Generally decreased, functional                Coordination:  Coordination: Within functional limits  Fine Motor Skills-Upper: Left Intact; Right Intact    Gross Motor Skills-Upper: Left Intact; Right Intact    Tone & Sensation:  Tone: Normal  Sensation: Intact                      Balance:  Sitting: Intact; Without support  Standing: Impaired; Without support  Standing - Static: Good;Constant support  Standing - Dynamic : Fair;Constant support    Functional Mobility and Transfers for ADLs:  Bed Mobility:       Transfers:  Sit to Stand: Contact guard assistance;Minimum assistance  Stand to Sit: Contact guard assistance  Bed to Chair: Contact guard assistance  Bathroom Mobility: Contact guard assistance  Toilet Transfer : Contact guard assistance  Shower Transfer: Contact guard assistance    ADL Assessment:  Feeding: Independent    Oral Facial Hygiene/Grooming: Independent    Bathing: Setup;Contact guard assistance    Upper Body Dressing: Setup    Lower Body Dressing: Contact guard assistance(for standing aspects)    Toileting: Contact guard assistance          ADL Intervention and task modifications:     Lower Body Dressing Assistance  Protective Undergarmet: Contact guard assistance(donned brief over feet and to thighs; CGA to bring to waist)  Socks: Set-up(seated in chair, increased time and RBs PRN)         Cognitive Retraining  Safety/Judgement: Fall prevention; Insight into deficits;Home safety    Functional Measure:  Barthel Index:    Bathin  Bladder: 0(significant difficulty w/ urinary continence; wears briefs)  Bowels: 5  Groomin  Dressin  Feeding: 10  Mobility: 0  Stairs: 0  Toilet Use: 5  Transfer (Bed to Chair and Back): 10  Total: 40/100        Percentage of impairment   0%   1-19%   20-39%   40-59%   60-79%   80-99%   100%   Barthel Score 0-100 100 99-80 79-60 59-40 20-39 1-19   0     The Barthel ADL Index: Guidelines  1. The index should be used as a record of what a patient does, not as a record of what a patient could do. 2. The main aim is to establish degree of independence from any help, physical or verbal, however minor and for whatever reason. 3. The need for supervision renders the patient not independent. 4. A patient's performance should be established using the best available evidence.  Asking the patient, friends/relatives and nurses are the usual sources, but direct observation and common sense are also important. However direct testing is not needed. 5. Usually the patient's performance over the preceding 24-48 hours is important, but occasionally longer periods will be relevant. 6. Middle categories imply that the patient supplies over 50 per cent of the effort. 7. Use of aids to be independent is allowed. Luis Rich., Barthel, D.W. ([id number]). Functional evaluation: the Barthel Index. [address] [facility] (14)2. Lady Crawley jadiel RAQUEL Batista, Tiara Vicente., Jay Jay Pina., Rehana, [address] [facility] (1999). Measuring the change indisability after inpatient rehabilitation; comparison of the responsiveness of the Barthel Index and Functional [place] Measure. Journal of Neurology, Neurosurgery, and Psychiatry, 66(4), 217-519. GASPER Christianson, YORDAN Morales, & Neo Paulino MJENNIFER. (2004.) Assessment of post-stroke quality of life in cost-effectiveness studies: The usefulness of the Barthel Index and the EuroQoL-5D. Quality of Life Research, 15, 991-17        Occupational Therapy Evaluation Charge Determination   History Examination Decision-Making   LOW Complexity : Brief history review  LOW Complexity : 1-3 performance deficits relating to physical, cognitive , or psychosocial skils that result in activity limitations and / or participation restrictions  LOW Complexity : No comorbidities that affect functional and no verbal or physical assistance needed to complete eval tasks       Based on the above components, the patient evaluation is determined to be of the following complexity level: LOW   Activity Tolerance:   Limited by SOB during seated LB ADLs and mild SOB with ambulating from chair to door and back. Please refer to the flowsheet for vital signs taken during this treatment. After treatment:   ? Patient left in no apparent distress sitting up in chair  ? Patient left in no apparent distress in bed  ? Call bell left within reach  ? Nursing notified  ? Caregiver present  ?  Bed alarm activated    COMMUNICATION/EDUCATION:   The patients plan of care was discussed with: Physical Therapist.  ? Home safety education was provided and the patient/caregiver indicated understanding. ? Patient/family have participated as able in goal setting and plan of care. ? Patient/family agree to work toward stated goals and plan of care. ? Patient understands intent and goals of therapy, but is neutral about his/her participation. ? Patient is unable to participate in goal setting and plan of care.     Thank you for this referral.  Xena Pineda, OTR/L  Time Calculation: 30 mins

## 2019-06-17 NOTE — DISCHARGE SUMMARY
Hospitalist Discharge Summary     Patient ID:  Anika Rg  [id number]  89 y.o.  1950    PCP on record: Shannan Avelar MD    Admit date: 6/15/2019  Discharge date and time: 6/17/2019      DISCHARGE DIAGNOSIS:    Recurrent fall, POA suspect due to dehydration and lethargy from hypercapnia  LEOLA on CKD stage 3, POA  Cr baseline 1.7, now 2.65.  Suspect dehydration  Lethargy with mild b/l hand asterixis due to hypercapnia, POA    Chronic respiratory failure on 2L with hypercapnia  Hx of Tobacco abuse (quit 3 months ago)  ? GURPREET  Oral candiasis  Chronic combined diastolic and systolic chf, POA  Grade 1 diastolic chf 0/2706, EF 79% on stress test 12/17  CAD hx stent  Cardiomyopathy  DM type 2  LEOLA on CKD  Hypokalemia  Morbid obesity    CONSULTATIONS:  IP CONSULT TO HOSPITALIST  IP CONSULT TO PULMONOLOGY    Excerpted HPI from H&P of Thierno Izquierdo MD:  Anika Rg is a 78 y.o.  female past medical history significant for diabetes, heart failure, oxygen dependent COPD on 2L presents to ER after recurrent falls. Seen in ER 4 days ago for same. Yesterday fell 2x and today 1x. Uses rollator baseline. Most of falls occurring in bathroom. Patient says when she is standing or walking for long time, legs become weak. Denies any injury from falls. Denies LOC, chest pain, cough, SOB. No fever or chills. Appetite poor this week.     Lives in senior housing apartment and used pulled cord after fall today. Daughter mentions patient had intermittent slurred speech earlier this week.     Last admitted 1/18 for generalized weakness due to overdiuresis with hyponatremia and hypokalemia, ground level fall.           ______________________________________________________________________  DISCHARGE SUMMARY/HOSPITAL COURSE:  for full details see H&P, daily progress notes, labs, consult notes.      Recurrent fall, POA suspect due to dehydration and lethargy from hypercapnia  LEOLA on CKD stage 3, POA  Cr baseline 1.7, now 2.65.  Suspect dehydration (resolved)  Lethargy with mild b/l hand asterixis due to hypercapnia, POA   Chronic respiratory failure on 2L with hypercapnia  Hx of Tobacco abuse (quit 3 months ago)  ? GURPREET  - Daughter reports home sleep study negative for sleep apnea but it is unclear wether the pt and the daughter are referring to overnight pulse oximetry. Pulmonologist recommends outpatient sleep study and complete complete PFT as outpatient    - O2 sat 99% @ 2L via n/c which is her base line   - Continue with home Spiriva and Symbicort    duoneb tmt as need    ABG 7.33/62/125/33 on admission, required BIPAP overnight, currently at 2L via n/c. No CPAP/BIPAP at home    CXR (-)    Head CT (-)  - check orthostatic BP (-)     Oral candiasis  - nystatin swish and spit for 10 days     Chronic combined diastolic and systolic chf, POA  Grade 1 diastolic chf 4/6436, EF 36% on stress test 12/17  CAD hx stent  Cardiomyopathy  - Echo:   · Left Ventricle: Left ventricle not well visualized. Mildly dilated left ventricle. Mild systolic dysfunction. Estimated left ventricular ejection fraction is 41 - 45%. Visually measured ejection fraction. Left ventricular global hypokinesis. · Right Ventricle: Not well visualized. - continue ASA, coreg, plavix, imdur     DM type 2  - Continue with lantus, tradjenta; advised to check blood glucose before each meals and at bedtime     LEOLA on CKD  - creat trending down; 1.35 from 2.65    Avoid nephrotoxic agents     Hypokalemia  - replaced; pt may resume her aldactone and kcl supplement as home     Morbid obesity  Body mass index is 37.11 kg/m².           _______________________________________________________________________  Patient seen and examined by me on discharge day. Pertinent Findings:  Gen:    Not in distress  Chest: Clear lungs  CVS:   Regular rhythm.   No edema  Abd:  Soft, not distended, not tender  Neuro:  Alert, orient x 4  _______________________________________________________________________  DISCHARGE MEDICATIONS:   Current Discharge Medication List      START taking these medications    Details   nystatin (MYCOSTATIN) 100,000 unit/mL suspension Take 5 mL by mouth four (4) times daily for 6 days. swish and spit  Qty: 120 mL, Refills: 0         CONTINUE these medications which have NOT CHANGED    Details   bumetanide (BUMEX) 2 mg tablet Take 1 Tab by mouth two (2) times a day. Qty: 2 Tab, Refills: 0      potassium chloride SR (K-TAB) 20 mEq tablet Take 3 Tabs by mouth two (2) times a day. Take with bumex  Qty: 180 Tab, Refills: 0      magnesium oxide (MAG-OX) 400 mg tablet Take 2 Tabs by mouth two (2) times a day. Take with bumex  Qty: 120 Tab, Refills: 0      carvedilol (COREG) 6.25 mg tablet Take 6.25 mg by mouth two (2) times daily (with meals). traMADol (ULTRAM) 50 mg tablet Take 50 mg by mouth three (3) times daily as needed (back pain). Pt taking only once a day. nitroglycerin (NITROSTAT) 0.4 mg SL tablet by SubLINGual route every five (5) minutes as needed for Chest Pain.      tiotropium (SPIRIVA WITH HANDIHALER) 18 mcg inhalation capsule Take 1 Cap by inhalation daily. spironolactone (ALDACTONE) 25 mg tablet Take 25 mg by mouth daily. ferrous sulfate 325 mg (65 mg iron) tablet Take 325 mg by mouth two (2) times daily (with meals). albuterol (PROVENTIL VENTOLIN) 2.5 mg /3 mL (0.083 %) nebulizer solution 3 mL by Nebulization route every four (4) hours as needed for Wheezing or Shortness of Breath. Qty: 24 Each, Refills: 3      benzonatate (TESSALON) 100 mg capsule Take 1 Cap by mouth three (3) times daily as needed for Cough. Qty: 30 Cap, Refills: 0      aspirin delayed-release 81 mg tablet Take 81 mg by mouth daily. atorvastatin (LIPITOR) 80 mg tablet Take 80 mg by mouth daily. omeprazole (PRILOSEC) 20 mg capsule Take 20 mg by mouth Before breakfast and dinner.       gabapentin (NEURONTIN) 300 mg capsule Take 300 mg by mouth three (3) times daily. montelukast (SINGULAIR) 10 mg tablet Take 10 mg by mouth daily. cholecalciferol (VITAMIN D3) 1,000 unit tablet Take 1,000 Units by mouth daily. clopidogrel (PLAVIX) 75 mg tablet Take 75 mg by mouth daily. fluticasone (FLONASE) 50 mcg/actuation nasal spray 2 Sprays by Both Nostrils route daily. sertraline (ZOLOFT) 100 mg tablet Take 150 mg by mouth daily. loratadine (CLARITIN) 10 mg tablet Take 10 mg by mouth daily as needed for Allergies or Rhinitis. albuterol (PROVENTIL HFA, VENTOLIN HFA) 90 mcg/actuation inhaler Take 2 Puffs by inhalation every four (4) hours as needed for Wheezing or Shortness of Breath. glipiZIDE (GLUCOTROL) 5 mg tablet Take 1 Tab by mouth daily (with dinner). Qty: 30 Tab, Refills: 2      SITagliptin (JANUVIA) 100 mg tablet Take 100 mg by mouth daily. isosorbide mononitrate ER (IMDUR) 30 mg tablet Take 30 mg by mouth daily. insulin glargine (LANTUS) 100 unit/mL injection 30 Units by SubCUTAneous route nightly. ipratropium (ATROVENT) 0.02 % nebulizer solution 2.5 mL by Nebulization route every six (6) hours as needed for Wheezing. Qty: 25 mL, Refills: 3      budesonide-formoterol (SYMBICORT) 160-4.5 mcg/actuation HFA inhaler Take 2 Puffs by inhalation two (2) times a day. acetaminophen (TYLENOL) 500 mg tablet Take 500 mg by mouth every four (4) hours.  Indications: BACK PAIN         STOP taking these medications       insulin NPH/insulin regular (NOVOLIN 70/30, HUMULIN 70/30) 100 unit/mL (70-30) injection Comments:   Reason for Stopping:         fluticasone propion-salmeterol (ADVAIR/WIXELA) 500-50 mcg/dose diskus inhaler Comments:   Reason for Stopping:         oxyCODONE-acetaminophen (PERCOCET) 5-325 mg per tablet Comments:   Reason for Stopping:               My Recommended Diet, Activity, Wound Care, and follow-up labs are listed in the patient's Discharge Insturctions which I have personally completed and reviewed. _______________________________________________________________________  DISPOSITION:    Home with Family:    Home with HH/PT/OT/RN: y   SNF/LTC:    FARIHA:    OTHER:        Condition at Discharge:  Stable  _______________________________________________________________________  Follow up with:   PCP : Shannan Avelar MD  Follow-up Information     Follow up With Specialties Details Why Contact Info      Schedule an appointment as soon as possible for a visit      Shannan Avelar MD    Patient can only remember the practice name and not the physician          [facility].   .      Total time in minutes spent coordinating this discharge (includes going over instructions, follow-up, prescriptions, and preparing report for sign off to her PCP) :  40 minutes    Signed:  Ashley Cline NP

## 2019-06-17 NOTE — PROGRESS NOTES
Physical Therapy    Chart reviewed and PT evaluation completed. Patient would benefit from home health post discharge, full note to follow.     Leo Ruiz

## 2019-06-18 LAB
BACTERIA SPEC CULT: ABNORMAL
SERVICE CMNT-IMP: ABNORMAL

## 2019-06-19 NOTE — ADT AUTH CERT NOTES
PLEASE FAX BACK   AUTHORIZATION TO [facility] Gaona FAX # [phone]            Patient Demographics     Patient Name  Elisha Lung  [id number] Sex  Female   1950 Address  [address] [place] [address] [address] [facility] [address] Phone  [phone] (Home)  [phone] (Mobile) *Preferred*   Discharge Information     Discharge Provider Date/Time Disposition Destination   Anoop Chong MD / [phone] 19 1421 Home or Self Care (none)   Comments   (none)

## 2019-07-29 ENCOUNTER — APPOINTMENT (OUTPATIENT)
Dept: GENERAL RADIOLOGY | Age: 69
End: 2019-07-29
Attending: EMERGENCY MEDICINE
Payer: MEDICARE

## 2019-07-29 ENCOUNTER — APPOINTMENT (OUTPATIENT)
Dept: CT IMAGING | Age: 69
End: 2019-07-29
Attending: EMERGENCY MEDICINE
Payer: MEDICARE

## 2019-07-29 ENCOUNTER — HOSPITAL ENCOUNTER (EMERGENCY)
Age: 69
Discharge: HOME | End: 2019-07-29
Attending: EMERGENCY MEDICINE
Payer: MEDICARE

## 2019-07-29 VITALS
HEART RATE: 81 BPM | OXYGEN SATURATION: 100 % | SYSTOLIC BLOOD PRESSURE: 107 MMHG | DIASTOLIC BLOOD PRESSURE: 41 MMHG | BODY MASS INDEX: 39.45 KG/M2 | WEIGHT: 202 LBS | RESPIRATION RATE: 15 BRPM | TEMPERATURE: 99.7 F
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
## 2019-07-29 DIAGNOSIS — N17.9 AKI (ACUTE KIDNEY INJURY) (HCC): ICD-10-CM

## 2019-07-29 DIAGNOSIS — T07.XXXA MULTIPLE CONTUSIONS: ICD-10-CM

## 2019-07-29 LAB
ALBUMIN SERPL-MCNC: 3.2 G/DL (ref 3.5–5)
ALBUMIN/GLOB SERPL: 0.8 {RATIO} (ref 1.1–2.2)
ALP SERPL-CCNC: 76 U/L (ref 45–117)
ALT SERPL-CCNC: 62 U/L (ref 12–78)
ANION GAP SERPL CALC-SCNC: 6 MMOL/L (ref 5–15)
APPEARANCE UR: CLEAR
AST SERPL-CCNC: 68 U/L (ref 15–37)
ATRIAL RATE: 81 BPM
BACTERIA URNS QL MICRO: ABNORMAL /HPF
BASOPHILS # BLD: 0 K/UL (ref 0–0.1)
BASOPHILS NFR BLD: 1 % (ref 0–1)
BILIRUB SERPL-MCNC: 0.4 MG/DL (ref 0.2–1)
BILIRUB UR QL: NEGATIVE
BUN SERPL-MCNC: 60 MG/DL (ref 6–20)
BUN/CREAT SERPL: 22 (ref 12–20)
CALCIUM SERPL-MCNC: 8.8 MG/DL (ref 8.5–10.1)
CALCULATED P AXIS, ECG09: 61 DEGREES
CALCULATED R AXIS, ECG10: 26 DEGREES
CALCULATED T AXIS, ECG11: 57 DEGREES
CHLORIDE SERPL-SCNC: 102 MMOL/L (ref 97–108)
CO2 SERPL-SCNC: 34 MMOL/L (ref 21–32)
COLOR UR: ABNORMAL
CREAT SERPL-MCNC: 2.78 MG/DL (ref 0.55–1.02)
DIAGNOSIS, 93000: NORMAL
DIFFERENTIAL METHOD BLD: ABNORMAL
EOSINOPHIL # BLD: 0.3 K/UL (ref 0–0.4)
EOSINOPHIL NFR BLD: 5 % (ref 0–7)
EPITH CASTS URNS QL MICRO: ABNORMAL /LPF
ERYTHROCYTE [DISTWIDTH] IN BLOOD BY AUTOMATED COUNT: 14.8 % (ref 11.5–14.5)
GLOBULIN SER CALC-MCNC: 4 G/DL (ref 2–4)
GLUCOSE SERPL-MCNC: 104 MG/DL (ref 65–100)
GLUCOSE UR STRIP.AUTO-MCNC: NEGATIVE MG/DL
HCT VFR BLD AUTO: 31 % (ref 35–47)
HGB BLD-MCNC: 9.2 G/DL (ref 11.5–16)
HGB UR QL STRIP: NEGATIVE
IMM GRANULOCYTES # BLD AUTO: 0 K/UL (ref 0–0.04)
IMM GRANULOCYTES NFR BLD AUTO: 0 % (ref 0–0.5)
KETONES UR QL STRIP.AUTO: NEGATIVE MG/DL
LEUKOCYTE ESTERASE UR QL STRIP.AUTO: ABNORMAL
LYMPHOCYTES # BLD: 1 K/UL (ref 0.8–3.5)
LYMPHOCYTES NFR BLD: 21 % (ref 12–49)
MCH RBC QN AUTO: 27 PG (ref 26–34)
MCHC RBC AUTO-ENTMCNC: 29.7 G/DL (ref 30–36.5)
MCV RBC AUTO: 90.9 FL (ref 80–99)
MONOCYTES # BLD: 0.4 K/UL (ref 0–1)
MONOCYTES NFR BLD: 8 % (ref 5–13)
NEUTS SEG # BLD: 3.3 K/UL (ref 1.8–8)
NEUTS SEG NFR BLD: 65 % (ref 32–75)
NITRITE UR QL STRIP.AUTO: NEGATIVE
NRBC # BLD: 0 K/UL (ref 0–0.01)
NRBC BLD-RTO: 0 PER 100 WBC
P-R INTERVAL, ECG05: 110 MS
PH UR STRIP: 6 [PH] (ref 5–8)
PLATELET # BLD AUTO: 183 K/UL (ref 150–400)
PMV BLD AUTO: 11.1 FL (ref 8.9–12.9)
POTASSIUM SERPL-SCNC: 4.2 MMOL/L (ref 3.5–5.1)
PROT SERPL-MCNC: 7.2 G/DL (ref 6.4–8.2)
PROT UR STRIP-MCNC: NEGATIVE MG/DL
Q-T INTERVAL, ECG07: 390 MS
QRS DURATION, ECG06: 100 MS
QTC CALCULATION (BEZET), ECG08: 453 MS
RBC # BLD AUTO: 3.41 M/UL (ref 3.8–5.2)
RBC #/AREA URNS HPF: ABNORMAL /HPF (ref 0–5)
SODIUM SERPL-SCNC: 142 MMOL/L (ref 136–145)
SP GR UR REFRACTOMETRY: 1.01 (ref 1–1.03)
UA: UC IF INDICATED,UAUC: ABNORMAL
UROBILINOGEN UR QL STRIP.AUTO: 0.2 EU/DL (ref 0.2–1)
VENTRICULAR RATE, ECG03: 81 BPM
WBC # BLD AUTO: 5 K/UL (ref 3.6–11)
WBC URNS QL MICRO: ABNORMAL /HPF (ref 0–4)

## 2019-07-29 PROCEDURE — 93005 ELECTROCARDIOGRAM TRACING: CPT

## 2019-07-29 PROCEDURE — 71045 X-RAY EXAM CHEST 1 VIEW: CPT

## 2019-07-29 PROCEDURE — 96361 HYDRATE IV INFUSION ADD-ON: CPT

## 2019-07-29 PROCEDURE — 99285 EMERGENCY DEPT VISIT HI MDM: CPT

## 2019-07-29 PROCEDURE — 80053 COMPREHEN METABOLIC PANEL: CPT

## 2019-07-29 PROCEDURE — 81001 URINALYSIS AUTO W/SCOPE: CPT

## 2019-07-29 PROCEDURE — 74011250636 HC RX REV CODE- 250/636: Performed by: EMERGENCY MEDICINE

## 2019-07-29 PROCEDURE — 85025 COMPLETE CBC W/AUTO DIFF WBC: CPT

## 2019-07-29 PROCEDURE — 87086 URINE CULTURE/COLONY COUNT: CPT

## 2019-07-29 PROCEDURE — 97116 GAIT TRAINING THERAPY: CPT

## 2019-07-29 PROCEDURE — 97161 PT EVAL LOW COMPLEX 20 MIN: CPT

## 2019-07-29 PROCEDURE — 74176 CT ABD & PELVIS W/O CONTRAST: CPT

## 2019-07-29 PROCEDURE — 36415 COLL VENOUS BLD VENIPUNCTURE: CPT

## 2019-07-29 PROCEDURE — 96360 HYDRATION IV INFUSION INIT: CPT

## 2019-07-29 RX ADMIN — SODIUM CHLORIDE 500 ML: 900 INJECTION, SOLUTION INTRAVENOUS at 13:57

## 2019-07-29 RX ADMIN — SODIUM CHLORIDE 1000 ML: 900 INJECTION, SOLUTION INTRAVENOUS at 11:09

## 2019-07-29 NOTE — DISCHARGE INSTRUCTIONS
Patient Education        Preventing Falls: Care Instructions  Your Care Instructions    Getting around your home safely can be a challenge if you have injuries or health problems that make it easy for you to fall. Loose rugs and furniture in walkways are among the dangers for many older people who have problems walking or who have poor eyesight. People who have conditions such as arthritis, osteoporosis, or dementia also have to be careful not to fall. You can make your home safer with a few simple measures. Follow-up care is a key part of your treatment and safety. Be sure to make and go to all appointments, and call your doctor if you are having problems. It's also a good idea to know your test results and keep a list of the medicines you take. How can you care for yourself at home? Taking care of yourself  · You may get dizzy if you do not drink enough water. To prevent dehydration, drink plenty of fluids, enough so that your urine is light yellow or clear like water. Choose water and other caffeine-free clear liquids. If you have kidney, heart, or liver disease and have to limit fluids, talk with your doctor before you increase the amount of fluids you drink. · Exercise regularly to improve your strength, muscle tone, and balance. Walk if you can. Swimming may be a good choice if you cannot walk easily. · Have your vision and hearing checked each year or any time you notice a change. If you have trouble seeing and hearing, you might not be able to avoid objects and could lose your balance. · Know the side effects of the medicines you take. Ask your doctor or pharmacist whether the medicines you take can affect your balance. Sleeping pills or sedatives can affect your balance. · Limit the amount of alcohol you drink. Alcohol can impair your balance and other senses. · Ask your doctor whether calluses or corns on your feet need to be removed.  If you wear loose-fitting shoes because of calluses or corns, you can lose your balance and fall. · Talk to your doctor if you have numbness in your feet. Preventing falls at home  · Remove raised doorway thresholds, throw rugs, and clutter. Repair loose carpet or raised areas in the floor. · Move furniture and electrical cords to keep them out of walking paths. · Use nonskid floor wax, and wipe up spills right away, especially on ceramic tile floors. · If you use a walker or cane, put rubber tips on it. If you use crutches, clean the bottoms of them regularly with an abrasive pad, such as steel wool. · Keep your house well lit, especially [place], and outside walkways. Use night-lights in areas such as hallways and bathrooms. Add extra light switches or use remote switches (such as switches that go on or off when you clap your hands) to make it easier to turn lights on if you have to get up during the night. · Install sturdy handrails on stairways. · Move items in your cabinets so that the things you use a lot are on the lower shelves (about waist level). · Keep a cordless phone and a flashlight with new batteries by your bed. If possible, put a phone in each of the main rooms of your house, or carry a cell phone in case you fall and cannot reach a phone. Or, you can wear a device around your neck or wrist. You push a button that sends a signal for help. · Wear low-heeled shoes that fit well and give your feet good support. Use footwear with nonskid soles. Check the heels and soles of your shoes for wear. Repair or replace worn heels or soles. · Do not wear socks without shoes on wood floors. · Walk on the grass when the sidewalks are slippery. If you live in an area that gets snow and ice in the winter, sprinkle salt on slippery steps and sidewalks. Preventing falls in the bath  · Install grab bars and nonskid mats inside and outside your shower or tub and near the toilet and sinks. · Use shower chairs and bath benches.   · Use a hand-held shower head that will allow you to sit while showering. · Get into a tub or shower by putting the weaker leg in first. Get out of a tub or shower with your strong side first.  · Repair loose toilet seats and consider installing a raised toilet seat to make getting on and off the toilet easier. · Keep your bathroom door unlocked while you are in the shower. Where can you learn more? Go to http://vishal-moody.info/. Enter 0476 [id number] 69 [id number] in the search box to learn more about \"Preventing Falls: Care Instructions. \"  Current as of: November 7, 2018  Content Version: 12.1  © 2188-5640 [employer]. Care instructions adapted under license by [employer] (which disclaims liability or warranty for this information). If you have questions about a medical condition or this instruction, always ask your healthcare professional. [place] [address] any warranty or liability for your use of this information. Patient Education        Acute Kidney Injury: Care Instructions  Your Care Instructions    Acute kidney injury (LEOLA) is a sudden decrease in kidney function. This can happen over a period of hours, days or, in some cases, weeks. LEOLA used to be called acute renal failure, but kidney failure doesn't always happen with LEOLA. Common causes of LEOLA are dehydration, blood loss, and medicines. When LEOLA happens, the kidneys have trouble removing waste and excess fluids from the body. The waste and fluids build up and become harmful. Kidney function may return to normal if the cause of LEOLA is treated quickly. Your chance of a full recovery depends on what caused the problem, how quickly the cause was treated, and what other medical problems you have. A machine may be used to help your kidneys remove waste and fluids for a short period of time. This is called dialysis. Follow-up care is a key part of your treatment and safety.  Be sure to make and go to all appointments, and call your doctor if you are having problems. It's also a good idea to know your test results and keep a list of the medicines you take. How can you care for yourself at home? · Talk to your doctor about how much fluid you should drink. · Eat a balanced diet. Talk to your doctor or a dietitian about what type of diet may be best for you. You may need to limit sodium, potassium, and phosphorus. · If you need dialysis, follow the instructions and schedule for dialysis that your doctor gives you. · Do not smoke. Smoking can make your condition worse. If you need help quitting, talk to your doctor about stop-smoking programs and medicines. These can increase your chances of quitting for good. · Do not drink alcohol. · Review all of your medicines with your doctor. Do not take any medicines, including nonsteroidal anti-inflammatory drugs (NSAIDs) such as ibuprofen (Advil, Motrin) or naproxen (Aleve), unless your doctor says it is safe for you to do so. · Make sure that anyone treating you for any health problem knows that you have had LEOLA. When should you call for help? Call 911 anytime you think you may need emergency care. For example, call if:    · You passed out (lost consciousness).    Call your doctor now or seek immediate medical care if:    · You have new or worse nausea and vomiting.     · You have much less urine than normal, or you have no urine.     · You are feeling confused or cannot think clearly.     · You have new or more blood in your urine.     · You have new swelling.     · You are dizzy or lightheaded, or feel like you may faint.    Watch closely for changes in your health, and be sure to contact your doctor if:    · You do not get better as expected. Where can you learn more? Go to http://vishal-moody.info/. Enter D367 in the search box to learn more about \"Acute Kidney Injury: Care Instructions. \"  Current as of: October 31, 2018  Content Version: 12.1  © 2597-6237 [employer]. Care instructions adapted under license by [employer] (which disclaims liability or warranty for this information). If you have questions about a medical condition or this instruction, always ask your healthcare professional. [address] any warranty or liability for your use of this information.

## 2019-07-29 NOTE — ED NOTES
Pt to ED after falling twice in the last 24 hours. Pt states last night fell in the bathroom and had to call 911 for a lift assist. Pt states today she fell again and was unable to get up and called 911 again. Pt lives by herself and uses her walker to get around. Pt has significant bruising to left upper abd and rib cage area. Pt states a lot of pain in that area. Denies any LOC or head trauma.

## 2019-07-29 NOTE — PROGRESS NOTES
physical Therapy Emergency Department EVALUATION/DISCHARGE  Patient: Anil Dutton (1 y.o. female)  Date: 7/29/2019  Primary Diagnosis: No admission diagnoses are documented for this encounter. Precautions:      ASSESSMENT :  Based on the objective data described below, the patient presents with recent baseline function and gait with no new sxs upon amb in ED. Asked by Dr. Jordan Miller for mobility eval prior to d/c. Pt lives on her own in a second floor apt with elevator entrance. She states she amb mod I with rollator in her apt. She sink bathes but has aides that come 3 days a week and help with shower, meds, and IADLs. She states she dresses self. She has a long hx of falls and has had 2 falls this week. [facility] and states she is going to be increased to 3 days; program included PT and OT. At this time pt is min A to come to EOB, mainly due to difference from her own bed per pt. She is mod I coming to stand with hands on her own braked rollator. She amb with rollator mod I with slow but steady gait. Pt on 2L here and at baseline. VSS. No new c/o during mobility. Pt does score 24/28 on the tinetti scale placing her in the moderate fall risk category. Pt is managed by [facility]. Would recommend continued therapy within the program. Dtr states that most of falls have been in bathroom. Recommended possible home safety eval but pt states that OT has already been in to do that. Dtr notes that pt has been recommended to use her [facility] but that she walks past it to go into the bathroom. Recommended further discussion with OT re: any other recommendations. No other acute PT needs at this time. Further acute physical therapy in the ED is not indicated at this time.      PLAN :  Discharge Recommendations:     [x]   Home with family, [facility] support, PT/OT at [facility]   []   [address] [place] [address]  []   Admission to hospital with rehab likely needed  []   Inpatient rehab referral  []   Outpatient physical therapy referral  []   Other:    Further Equipment Recommendations for Discharge:   []   Rolling walker with 5\" wheels  []   Crutches   []   Cane   []   Wheelchair   [x]   Other: has needed equip; encourage use of BSC for safety    COMMUNICATION/EDUCATION:   Communication/Collaboration:  [x]   Fall prevention education was provided and the patient/caregiver indicated understanding. [x]   Patient/family have participated as able and agree with findings and recommendations. []   Patient is unable to participate in plan of care at this time. Findings and recommendations were discussed with: MD physician and Registered Nurse       SUBJECTIVE:   Patient stated I don't need a w/c. I'm not there yet.     OBJECTIVE DATA SUMMARY:   HISTORY:    Past Medical History:   Diagnosis Date    Asthma     CAD (coronary artery disease)     Chronic kidney disease     Chronic obstructive pulmonary disease ([facility] [address]     H/O acute myocardial infarction     Heart failure (HCC)     Hypertension      Past Surgical History:   Procedure Laterality Date    HX CHOLECYSTECTOMY      gall stones removed    HX GYN       Prior Level of Function/Home Situation: Pt lives on her own in a second floor apt with elevator entrance. She states she amb mod I with rollator in her apt. She sink bathes but has aides that come 3 days a week and help with shower, meds, and IADLs. She states she dresses self. She has a long hx of falls and has had 2 falls this week. [facility] and states she is going to be increased to 3 days; program included PT and OT. Home Situation  Home Environment: Apartment  # Steps to Enter:  0(elevator)  One/Two Story Residence: One story  Living Alone: Yes  Support Systems: Child(jarvis), Family member(s), Home care staff(has aide M-W-F for 2-3 hours, A w/shower, IADLs, meds)  Patient Expects to be Discharged to[de-identified] Apartment  Current DME Used/Available at Home: Commode, bedside, Oxygen, portable, Shower chair, Walker, rollator    EXAMINATION/PRESENTATION/DECISION MAKING:   Critical Behavior:  Neurologic State: Alert  Orientation Level: Oriented X4  Cognition: Follows commands     Hearing: Auditory  Auditory Impairment: None    Range Of Motion:  AROM: Within functional limits           PROM: Within functional limits           Strength:    Strength: Generally decreased, functional                    Tone & Sensation:                  Sensation: (no c/o numbness)               Coordination:  Coordination: Within functional limits       Functional Mobility:  Bed Mobility:  Rolling: Independent  Supine to Sit: Minimum assistance(pt states due to stretcher diff from bed)  Sit to Supine: Contact guard assistance     Transfers:  Sit to Stand: Modified independent(places hands on locked rollator)  Stand to Sit: Modified independent                       Balance:   Sitting: Intact  Standing: Impaired  Standing - Static: Good  Standing - Dynamic : Fair  Ambulation/Gait Training:  Distance (ft): 125 Feet (ft)  Assistive Device: Gait belt;Walker, rollator  Ambulation - Level of Assistance: Modified independent                       Speed/Felisha: Slow;Pace decreased (<100 feet/min)             Special Tests:  Tinetti test:    Sitting Balance: 1  Arises: 1  Attempts to Rise: 2  Immediate Standing Balance: 2  Standing Balance: 2  Nudged: 2  Eyes Closed: 1  Turn 360 Degrees - Continuous/Discontinuous: 1  Turn 360 Degrees - Steady/Unsteady: 1  Sitting Down: 1  Balance Score: 14  Indication of Gait: 1  R Step Length/Height: 1  L Step Length/Height: 1  R Foot Clearance: 1  L Foot Clearance: 1  Step Symmetry: 1  Step Continuity: 1  Path: 1  Trunk: 1  Walking Time: 1  Gait Score: 10  Total Score: 24         Tinetti Tool Score Risk of Falls  <19 = High Fall Risk  19-24 = Moderate Fall Risk  25-28 = Low Fall Risk  Tinetti ME.  Performance-Oriented Assessment of Mobility Problems in Elderly Patients. [facility]; [id number]. (Scoring Description: PT Bulletin Feb. 10, 1993)    Older adults: Valentino Listen et al, 2009; n = 1000 [facility] elderly evaluated with ABC, JUANIS, ADL, and IADL)  · Mean JUANIS score for males aged 69-68 years = 26.21(3.40)  · Mean JUANIS score for females age 69-68 years = 25.16(4.30)  · Mean JUANIS score for males over 80 years = 23.29(6.02)  · Mean JUANIS score for females over 80 years = 17.20(8.32)        Physical Therapy Evaluation Charge Determination   History Examination Presentation Decision-Making   HIGH Complexity :3+ comorbidities / personal factors will impact the outcome/ POC  MEDIUM Complexity : 3 Standardized tests and measures addressing body structure, function, activity limitation and / or participation in recreation  LOW Complexity : Stable, uncomplicated  LOW Complexity : FOTO score of       Based on the above components, the patient evaluation is determined to be of the following complexity level: LOW                 Activity Tolerance:   See above narrative  Please refer to the flowsheet for vital signs taken during this treatment.   After treatment:   []         Patient left in no apparent distress sitting up in chair  [x]         Patient left in no apparent distress in bed  [x]         Call bell left within reach  [x]         Nursing notified  []         Caregiver present  []         Bed alarm activated        Thank you for this referral.  Ernie Barbosa, PT   Time Calculation: 24 mins

## 2019-07-29 NOTE — ED NOTES
Pt resting comfortably. Updated on POC. Samuel villalobos her  called and updated and stated they could provide a ride home if needed.

## 2019-07-29 NOTE — ED NOTES
Pt discharged by Gwynn Apgar. Pt provided with discharge instructions Rx and instructions on follow up care. Pt out of ED by stretcher accompanied by EMS.

## 2019-07-30 LAB
BACTERIA SPEC CULT: NORMAL
CC UR VC: NORMAL
SERVICE CMNT-IMP: NORMAL

## 2019-08-01 NOTE — ED PROVIDER NOTES
EMERGENCY DEPARTMENT HISTORY AND PHYSICAL EXAM      Date: 7/29/2019  Patient Name: Boy Ospina  Patient Age and Sex: 78 y.o. female    History of Presenting Illness     Chief Complaint   Patient presents with    Fall    Cough       History Provided By: Patient    HPI: Boy Ospina, 78 y.o. female with a PMHx significant for CAD, CKD, COPD, HTN, CHF, presents to the ED after falling twice at home within the past 24 hours. First fall was yesterday evening. She tripped on a rug while walking to the bathroom and fell from standing. Called 911 for lift assist as she was not able to get up alone but felt well enough not to come to the hospital immediately. Today she fell again in the bathroom, this time slipped after a shower. Once again called 911 for assistance but this time came in for an evaluation. Patient lives by herself and is generally able to fully care for herself. She has family close by who offer support as needed. Uses walker to get around but only as needed. Did not use the walker during the last two falls. Denies LOC or head trauma. Landed on her sides both times. Pt denies any other alleviating or exacerbating factors. There are no other complaints, changes or physical findings at this time.      Past Medical History:   Diagnosis Date    Asthma     CAD (coronary artery disease)     Chronic kidney disease     Chronic obstructive pulmonary disease ([address]     H/O acute myocardial infarction     Heart failure ([address]     Hypertension      Past Surgical History:   Procedure Laterality Date    HX CHOLECYSTECTOMY      gall stones removed    HX GYN         PCP: Rosio, MD Shannan    Past History   Past Medical History:  Past Medical History:   Diagnosis Date    Asthma     CAD (coronary artery disease)     Chronic kidney disease     Chronic obstructive pulmonary disease ([address]     H/O acute myocardial infarction     Heart failure ([address]     Hypertension        Past Surgical History:  Past Surgical History:   Procedure Laterality Date    HX CHOLECYSTECTOMY      gall stones removed    HX GYN         Family History:  Family History   Problem Relation Age of Onset    Diabetes Mother     Heart Attack Mother        Social History:  Social History     Tobacco Use    Smoking status: Former Smoker     Last attempt to quit: 12/15/2018     Years since quittin.6    Smokeless tobacco: Never Used   Substance Use Topics    Alcohol use: No    Drug use: No       Allergies: Allergies   Allergen Reactions    Garlic Shortness of Breath    Metformin Nausea and Vomiting    Pcn [Penicillins] Hives       Current Medications:  No current facility-administered medications on file prior to encounter. Current Outpatient Medications on File Prior to Encounter   Medication Sig Dispense Refill    bumetanide (BUMEX) 2 mg tablet Take 1 Tab by mouth two (2) times a day. 2 Tab 0    potassium chloride SR (K-TAB) 20 mEq tablet Take 3 Tabs by mouth two (2) times a day. Take with bumex (Patient taking differently: Take 80 mEq by mouth three (3) times daily. Take with bumex; Pt takes 4T AM, 2T noon, 2T PM) 180 Tab 0    magnesium oxide (MAG-OX) 400 mg tablet Take 2 Tabs by mouth two (2) times a day. Take with bumex 120 Tab 0    glipiZIDE (GLUCOTROL) 5 mg tablet Take 1 Tab by mouth daily (with dinner). 30 Tab 2    carvedilol (COREG) 6.25 mg tablet Take 6.25 mg by mouth two (2) times daily (with meals).  traMADol (ULTRAM) 50 mg tablet Take 50 mg by mouth three (3) times daily as needed (back pain). Pt taking only once a day.  nitroglycerin (NITROSTAT) 0.4 mg SL tablet by SubLINGual route every five (5) minutes as needed for Chest Pain.  tiotropium (SPIRIVA WITH HANDIHALER) 18 mcg inhalation capsule Take 1 Cap by inhalation daily.  spironolactone (ALDACTONE) 25 mg tablet Take 25 mg by mouth daily.  SITagliptin (JANUVIA) 100 mg tablet Take 100 mg by mouth daily.       isosorbide mononitrate ER (IMDUR) 30 mg tablet Take 30 mg by mouth daily.  ferrous sulfate 325 mg (65 mg iron) tablet Take 325 mg by mouth two (2) times daily (with meals).  insulin glargine (LANTUS) 100 unit/mL injection 30 Units by SubCUTAneous route nightly.  albuterol (PROVENTIL VENTOLIN) 2.5 mg /3 mL (0.083 %) nebulizer solution 3 mL by Nebulization route every four (4) hours as needed for Wheezing or Shortness of Breath. 24 Each 3    benzonatate (TESSALON) 100 mg capsule Take 1 Cap by mouth three (3) times daily as needed for Cough. 30 Cap 0    ipratropium (ATROVENT) 0.02 % nebulizer solution 2.5 mL by Nebulization route every six (6) hours as needed for Wheezing. 25 mL 3    aspirin delayed-release 81 mg tablet Take 81 mg by mouth daily.  atorvastatin (LIPITOR) 80 mg tablet Take 80 mg by mouth daily.  budesonide-formoterol (SYMBICORT) 160-4.5 mcg/actuation HFA inhaler Take 2 Puffs by inhalation two (2) times a day.  acetaminophen (TYLENOL) 500 mg tablet Take 500 mg by mouth every four (4) hours. Indications: BACK PAIN      omeprazole (PRILOSEC) 20 mg capsule Take 20 mg by mouth Before breakfast and dinner.  gabapentin (NEURONTIN) 300 mg capsule Take 300 mg by mouth three (3) times daily.  montelukast (SINGULAIR) 10 mg tablet Take 10 mg by mouth daily.  cholecalciferol (VITAMIN D3) 1,000 unit tablet Take 1,000 Units by mouth daily.  clopidogrel (PLAVIX) 75 mg tablet Take 75 mg by mouth daily.  fluticasone (FLONASE) 50 mcg/actuation nasal spray 2 Sprays by Both Nostrils route daily.  sertraline (ZOLOFT) 100 mg tablet Take 150 mg by mouth daily.  loratadine (CLARITIN) 10 mg tablet Take 10 mg by mouth daily as needed for Allergies or Rhinitis.  albuterol (PROVENTIL HFA, VENTOLIN HFA) 90 mcg/actuation inhaler Take 2 Puffs by inhalation every four (4) hours as needed for Wheezing or Shortness of Breath.          Review of Systems   Review of Systems   Constitutional: Negative. Negative for appetite change, chills and fever. HENT: Negative for congestion, ear pain, rhinorrhea, sinus pain, trouble swallowing and voice change. Respiratory: Negative for cough, chest tightness, shortness of breath, wheezing and stridor. Thoracic wall pain, both sides   Cardiovascular: Negative for chest pain, palpitations and leg swelling. Gastrointestinal: Negative for abdominal pain, blood in stool, constipation, diarrhea, nausea and vomiting. Genitourinary: Positive for flank pain. Negative for difficulty urinating, dysuria, frequency and hematuria. Musculoskeletal: Negative for arthralgias and joint swelling. Skin: Negative. Negative for wound. Neurological: Negative for dizziness, syncope, weakness, numbness and headaches. Hematological: Negative for adenopathy. Bruises/bleeds easily. All other systems reviewed and are negative. Physical Exam   Physical Exam   Constitutional: She is oriented to person, place, and time. She appears well-developed and well-nourished. She does not appear ill. No distress. HENT:   Head: Atraumatic. Right Ear: External ear normal.   Left Ear: External ear normal.   Mouth/Throat: Oropharynx is clear and moist and mucous membranes are normal.   Eyes: Pupils are equal, round, and reactive to light. Conjunctivae and EOM are normal. No scleral icterus. Neck: Normal range of motion and full passive range of motion without pain. Neck supple. No JVD present. No spinous process tenderness present. Carotid bruit is not present. No thyroid mass and no thyromegaly present. Cardiovascular: Normal rate, regular rhythm, normal heart sounds, intact distal pulses and normal pulses. No murmur heard. Pulmonary/Chest: Effort normal and breath sounds normal. No respiratory distress. She exhibits tenderness and bony tenderness. She exhibits no laceration, no crepitus and no deformity. Abdominal: Soft.  Bowel sounds are normal. She exhibits no shifting dullness, no distension and no fluid wave. There is no hepatosplenomegaly, splenomegaly or hepatomegaly. There is tenderness (bilateral flanks). There is no rigidity, no rebound and no guarding. Musculoskeletal: Normal range of motion. She exhibits no edema, tenderness or deformity. Lymphadenopathy:     She has no cervical adenopathy. Neurological: She is alert and oriented to person, place, and time. She has normal strength and normal reflexes. No cranial nerve deficit or sensory deficit. Skin: Skin is warm and dry. Bruising (large contusions to patient's both flanks and mid-back on left) noted. No rash noted. She is not diaphoretic. Psychiatric: She has a normal mood and affect. Nursing note and vitals reviewed. Diagnostic Study Results     Labs -  Recent Results (from the past 200 hour(s))   CBC WITH AUTOMATED DIFF    Collection Time: 07/29/19  8:16 AM   Result Value Ref Range    WBC 5.0 3.6 - 11.0 K/uL    RBC 3.41 (L) 3.80 - 5.20 M/uL    HGB 9.2 (L) 11.5 - 16.0 g/dL    HCT 31.0 (L) 35.0 - 47.0 %    MCV 90.9 80.0 - 99.0 FL    MCH 27.0 26.0 - 34.0 PG    MCHC 29.7 (L) 30.0 - 36.5 g/dL    RDW 14.8 (H) 11.5 - 14.5 %    PLATELET 878 491 - 500 K/uL    MPV 11.1 8.9 - 12.9 FL    NRBC 0.0 0  WBC    ABSOLUTE NRBC 0.00 0.00 - 0.01 K/uL    NEUTROPHILS 65 32 - 75 %    LYMPHOCYTES 21 12 - 49 %    MONOCYTES 8 5 - 13 %    EOSINOPHILS 5 0 - 7 %    BASOPHILS 1 0 - 1 %    IMMATURE GRANULOCYTES 0 0.0 - 0.5 %    ABS. NEUTROPHILS 3.3 1.8 - 8.0 K/UL    ABS. LYMPHOCYTES 1.0 0.8 - 3.5 K/UL    ABS. MONOCYTES 0.4 0.0 - 1.0 K/UL    ABS. EOSINOPHILS 0.3 0.0 - 0.4 K/UL    ABS. BASOPHILS 0.0 0.0 - 0.1 K/UL    ABS. IMM.  GRANS. 0.0 0.00 - 0.04 K/UL    DF AUTOMATED     METABOLIC PANEL, COMPREHENSIVE    Collection Time: 07/29/19  8:16 AM   Result Value Ref Range    Sodium 142 136 - 145 mmol/L    Potassium 4.2 3.5 - 5.1 mmol/L    Chloride 102 97 - 108 mmol/L    CO2 34 (H) 21 - 32 mmol/L Anion gap 6 5 - 15 mmol/L    Glucose 104 (H) 65 - 100 mg/dL    BUN 60 (H) 6 - 20 MG/DL    Creatinine 2.78 (H) 0.55 - 1.02 MG/DL    BUN/Creatinine ratio 22 (H) 12 - 20      GFR est AA 20 (L) >60 ml/min/1.73m2    GFR est non-AA 17 (L) >60 ml/min/1.73m2    Calcium 8.8 8.5 - 10.1 MG/DL    Bilirubin, total 0.4 0.2 - 1.0 MG/DL    ALT (SGPT) 62 12 - 78 U/L    AST (SGOT) 68 (H) 15 - 37 U/L    Alk.  phosphatase 76 45 - 117 U/L    Protein, total 7.2 6.4 - 8.2 g/dL    Albumin 3.2 (L) 3.5 - 5.0 g/dL    Globulin 4.0 2.0 - 4.0 g/dL    A-G Ratio 0.8 (L) 1.1 - 2.2     EKG, 12 LEAD, INITIAL    Collection Time: 07/29/19  8:28 AM   Result Value Ref Range    Ventricular Rate 81 BPM    Atrial Rate 81 BPM    P-R Interval 110 ms    QRS Duration 100 ms    Q-T Interval 390 ms    QTC Calculation (Bezet) 453 ms    Calculated P Axis 61 degrees    Calculated R Axis 26 degrees    Calculated T Axis 57 degrees    Diagnosis       Sinus rhythm with short NV  When compared with ECG of 15-RICHARD-2019 12:07,  premature atrial complexes are no longer present  Nonspecific T wave abnormality no longer evident in Inferior leads  Nonspecific T wave abnormality now evident in Lateral leads  Confirmed by Zeny Holman ([id number]) on 7/29/2019 2:22:18 PM     URINALYSIS W/ REFLEX CULTURE    Collection Time: 07/29/19  9:57 AM   Result Value Ref Range    Color YELLOW/STRAW      Appearance CLEAR CLEAR      Specific gravity 1.011 1.003 - 1.030      pH (UA) 6.0 5.0 - 8.0      Protein NEGATIVE  NEG mg/dL    Glucose NEGATIVE  NEG mg/dL    Ketone NEGATIVE  NEG mg/dL    Bilirubin NEGATIVE  NEG      Blood NEGATIVE  NEG      Urobilinogen 0.2 0.2 - 1.0 EU/dL    Nitrites NEGATIVE  NEG      Leukocyte Esterase MODERATE (A) NEG      WBC 10-20 0 - 4 /hpf    RBC 5-10 0 - 5 /hpf    Epithelial cells FEW FEW /lpf    Bacteria 1+ (A) NEG /hpf    UA:UC IF INDICATED URINE CULTURE ORDERED (A) CNI     CULTURE, URINE    Collection Time: 07/29/19  9:57 AM   Result Value Ref Range    Special Requests: NO SPECIAL REQUESTS  Reflexed from [id number]        [place] Count [id number]  COLONIES/mL        Culture result: MIXED UROGENITAL LLOYD ISOLATED         Radiologic Studies -   CT ABD PELV WO CONT   Final Result   IMPRESSION:    1. Relatively extensive left flank contusion. No discrete hematoma. 2. Small left inguinal hernia with nonobstructed small bowel. XR CHEST PORT   Final Result   IMPRESSION:    Clear lungs. Medical Decision Making   I am the first provider for this patient. Records Reviewed: I reviewed our electronic medical record system for any past medical records that were available that may contribute to the patient's current condition, including their PMH, surgical history, social and family history. Reviewed the nursing notes and vital signs from today's visit. Vital Signs-Reviewed the patient's vital signs. Pulse Oximetry Analysis - 99% on RA    Cardiac Monitor:   Rate: 86 bpm  Rhythm: Normal Sinus Rhythm      ED ECG interpretation:  ECG shows normal sinus rhythm, with HR 81, normal intervals and no ST changes concerning for ischemia. This ECG was interpreted by Bernardo Jackson M.D. Provider Notes (Medical Decision Making):   Mrs. Zachery Plasencia is a very pleasant female who is here for an evaluation after falling twice at home since last night. Both falls were from standing and mechanical. No head trauma. However, she has extensive areas of ecchymosis on her back and lower flanks, all places of initial impact during the fall. Her vital signs are wnl and she feels remarkably well. Her CT is negative for any acute injuries. Basic screening labs are not significantly abnormal, although she does have an LEOLA. Reports normal urine output. I've spoken to her medical care providers, they will follow on this. Encouraged patient to take in plenty of fluids. I have also asked PT to evaluate her gait in order to ensure that she is stable enough and able to go home.  If that evaluation finds her safe to go, we will arrange transport. No other medical issues that need to be addressed emergently in the ED. ED Course:   Initial assessment performed. The patients presenting problems have been discussed, and they are in agreement with the care plan formulated and outlined with them. I have encouraged them to ask questions as they arise throughout their visit. Medications Administered During ED Course:  Medications   sodium chloride 0.9 % bolus infusion 1,000 mL (0 mL IntraVENous IV Completed 7/29/19 1313)   sodium chloride 0.9 % bolus infusion 500 mL (0 mL IntraVENous IV Completed 7/29/19 1517)   Progress note:  Patient has been reassessed and reports feeling considerably better, has normal vital signs and feels comfortable going home. I think this is reasonable as no findings today suggest a life-threatening condition. DISPOSITION: DISCHARGE  Yoana Martin's final labs and imaging have been reviewed with her. Patient has been counseled regarding her diagnosis. She verbally conveys understanding of the signs, symptoms, diagnosis and treatment plan. Agrees to follow up as recommended with Shannan Sharpe MD in 24 - 48 hours. She also agrees with the care-plan and conveys that all of her questions have been answered. I have provided patient with discharge instructions that include: 1) educational information regarding the diagnosis, 2) how to care for their diagnosis at home, 3) list of reasons why they would want to return to the ED prior to their follow-up appointment, should their condition change. Perla Abraham MD, MSc      Diagnosis     Clinical Impression:   1. Fall, initial encounter    2. Multiple contusions    3. LEOLA (acute kidney injury) [facility])        Attestation:  I personally performed the services described in this documentation on this date 7/29/2019 for patient Evette Mcgee.   Perla Abraham MD    Please note that this dictation was completed with Dragon, the computer voice recognition software. Quite often unanticipated grammatical, syntax, homophones, and other interpretive errors are inadvertently transcribed by the computer software. Please disregard these errors. Please excuse any errors that have escaped final proofreading.

## 2019-11-24 ENCOUNTER — HOSPITAL ENCOUNTER (INPATIENT)
Age: 69
LOS: 3 days | Discharge: HOME HEALTH | DRG: 683 | End: 2019-11-27
Attending: EMERGENCY MEDICINE | Admitting: INTERNAL MEDICINE
Payer: MEDICARE

## 2019-11-24 ENCOUNTER — APPOINTMENT (OUTPATIENT)
Dept: GENERAL RADIOLOGY | Age: 69
DRG: 683 | End: 2019-11-24
Attending: EMERGENCY MEDICINE
Payer: MEDICARE

## 2019-11-24 DIAGNOSIS — E86.0 MILD DEHYDRATION: ICD-10-CM

## 2019-11-24 DIAGNOSIS — N17.9 AKI (ACUTE KIDNEY INJURY) (HCC): Primary | ICD-10-CM

## 2019-11-24 DIAGNOSIS — E87.5 HYPERKALEMIA, DIMINISHED RENAL EXCRETION: ICD-10-CM

## 2019-11-24 LAB
ANION GAP SERPL CALC-SCNC: 7 MMOL/L (ref 5–15)
APPEARANCE UR: CLEAR
BASOPHILS # BLD: 0 K/UL (ref 0–0.1)
BASOPHILS NFR BLD: 0 % (ref 0–1)
BILIRUB UR QL: NEGATIVE
BUN SERPL-MCNC: 61 MG/DL (ref 6–20)
BUN/CREAT SERPL: 20 (ref 12–20)
CALCIUM SERPL-MCNC: 8.6 MG/DL (ref 8.5–10.1)
CHLORIDE SERPL-SCNC: 104 MMOL/L (ref 97–108)
CK SERPL-CCNC: 104 U/L (ref 26–192)
CO2 SERPL-SCNC: 26 MMOL/L (ref 21–32)
COLOR UR: NORMAL
CREAT SERPL-MCNC: 3.01 MG/DL (ref 0.55–1.02)
DIFFERENTIAL METHOD BLD: ABNORMAL
EOSINOPHIL # BLD: 0.2 K/UL (ref 0–0.4)
EOSINOPHIL NFR BLD: 3 % (ref 0–7)
ERYTHROCYTE [DISTWIDTH] IN BLOOD BY AUTOMATED COUNT: 14.6 % (ref 11.5–14.5)
GLUCOSE BLD STRIP.AUTO-MCNC: 99 MG/DL (ref 65–100)
GLUCOSE SERPL-MCNC: 133 MG/DL (ref 65–100)
GLUCOSE UR STRIP.AUTO-MCNC: NEGATIVE MG/DL
HCT VFR BLD AUTO: 27.6 % (ref 35–47)
HGB BLD-MCNC: 8.3 G/DL (ref 11.5–16)
HGB UR QL STRIP: NEGATIVE
IMM GRANULOCYTES # BLD AUTO: 0 K/UL (ref 0–0.04)
IMM GRANULOCYTES NFR BLD AUTO: 0 % (ref 0–0.5)
KETONES UR QL STRIP.AUTO: NEGATIVE MG/DL
LEUKOCYTE ESTERASE UR QL STRIP.AUTO: NEGATIVE
LYMPHOCYTES # BLD: 0.9 K/UL (ref 0.8–3.5)
LYMPHOCYTES NFR BLD: 13 % (ref 12–49)
MCH RBC QN AUTO: 27.2 PG (ref 26–34)
MCHC RBC AUTO-ENTMCNC: 30.1 G/DL (ref 30–36.5)
MCV RBC AUTO: 90.5 FL (ref 80–99)
MONOCYTES # BLD: 0.7 K/UL (ref 0–1)
MONOCYTES NFR BLD: 10 % (ref 5–13)
NEUTS SEG # BLD: 5.1 K/UL (ref 1.8–8)
NEUTS SEG NFR BLD: 74 % (ref 32–75)
NITRITE UR QL STRIP.AUTO: NEGATIVE
NRBC # BLD: 0 K/UL (ref 0–0.01)
NRBC BLD-RTO: 0 PER 100 WBC
PH UR STRIP: 5 [PH] (ref 5–8)
PLATELET # BLD AUTO: 190 K/UL (ref 150–400)
PMV BLD AUTO: 11.3 FL (ref 8.9–12.9)
POTASSIUM SERPL-SCNC: 5.9 MMOL/L (ref 3.5–5.1)
PROT UR STRIP-MCNC: NEGATIVE MG/DL
RBC # BLD AUTO: 3.05 M/UL (ref 3.8–5.2)
SERVICE CMNT-IMP: NORMAL
SODIUM SERPL-SCNC: 137 MMOL/L (ref 136–145)
SP GR UR REFRACTOMETRY: 1.01 (ref 1–1.03)
UROBILINOGEN UR QL STRIP.AUTO: 0.2 EU/DL (ref 0.2–1)
WBC # BLD AUTO: 6.9 K/UL (ref 3.6–11)

## 2019-11-24 PROCEDURE — 74011250636 HC RX REV CODE- 250/636: Performed by: EMERGENCY MEDICINE

## 2019-11-24 PROCEDURE — 82962 GLUCOSE BLOOD TEST: CPT

## 2019-11-24 PROCEDURE — 73562 X-RAY EXAM OF KNEE 3: CPT

## 2019-11-24 PROCEDURE — 74011000258 HC RX REV CODE- 258: Performed by: INTERNAL MEDICINE

## 2019-11-24 PROCEDURE — 36415 COLL VENOUS BLD VENIPUNCTURE: CPT

## 2019-11-24 PROCEDURE — 74011250637 HC RX REV CODE- 250/637: Performed by: INTERNAL MEDICINE

## 2019-11-24 PROCEDURE — 96360 HYDRATION IV INFUSION INIT: CPT

## 2019-11-24 PROCEDURE — 87186 SC STD MICRODIL/AGAR DIL: CPT

## 2019-11-24 PROCEDURE — 65660000000 HC RM CCU STEPDOWN

## 2019-11-24 PROCEDURE — 87086 URINE CULTURE/COLONY COUNT: CPT

## 2019-11-24 PROCEDURE — 96361 HYDRATE IV INFUSION ADD-ON: CPT

## 2019-11-24 PROCEDURE — 74011636637 HC RX REV CODE- 636/637: Performed by: INTERNAL MEDICINE

## 2019-11-24 PROCEDURE — 82550 ASSAY OF CK (CPK): CPT

## 2019-11-24 PROCEDURE — 87077 CULTURE AEROBIC IDENTIFY: CPT

## 2019-11-24 PROCEDURE — 99285 EMERGENCY DEPT VISIT HI MDM: CPT

## 2019-11-24 PROCEDURE — 80048 BASIC METABOLIC PNL TOTAL CA: CPT

## 2019-11-24 PROCEDURE — 81003 URINALYSIS AUTO W/O SCOPE: CPT

## 2019-11-24 PROCEDURE — 85025 COMPLETE CBC W/AUTO DIFF WBC: CPT

## 2019-11-24 RX ORDER — DEXTROSE 50 % IN WATER (D50W) INTRAVENOUS SYRINGE
25 ONCE
Status: DISCONTINUED | OUTPATIENT
Start: 2019-11-24 | End: 2019-11-24

## 2019-11-24 RX ORDER — DEXTROSE MONOHYDRATE 25 G/50ML
50 INJECTION, SOLUTION INTRAVENOUS
Status: COMPLETED | OUTPATIENT
Start: 2019-11-24 | End: 2019-11-24

## 2019-11-24 RX ORDER — ACETAMINOPHEN 325 MG/1
650 TABLET ORAL
Status: DISCONTINUED | OUTPATIENT
Start: 2019-11-24 | End: 2019-11-27 | Stop reason: HOSPADM

## 2019-11-24 RX ORDER — ONDANSETRON 2 MG/ML
4 INJECTION INTRAMUSCULAR; INTRAVENOUS
Status: DISCONTINUED | OUTPATIENT
Start: 2019-11-24 | End: 2019-11-27 | Stop reason: HOSPADM

## 2019-11-24 RX ORDER — LORATADINE 10 MG/1
10 TABLET ORAL
Status: DISCONTINUED | OUTPATIENT
Start: 2019-11-24 | End: 2019-11-27 | Stop reason: HOSPADM

## 2019-11-24 RX ORDER — FLUTICASONE FUROATE AND VILANTEROL 100; 25 UG/1; UG/1
1 POWDER RESPIRATORY (INHALATION) DAILY
Status: DISCONTINUED | OUTPATIENT
Start: 2019-11-25 | End: 2019-11-27 | Stop reason: HOSPADM

## 2019-11-24 RX ORDER — SODIUM CHLORIDE 0.9 % (FLUSH) 0.9 %
5-40 SYRINGE (ML) INJECTION AS NEEDED
Status: DISCONTINUED | OUTPATIENT
Start: 2019-11-24 | End: 2019-11-27 | Stop reason: HOSPADM

## 2019-11-24 RX ORDER — IPRATROPIUM BROMIDE AND ALBUTEROL SULFATE 2.5; .5 MG/3ML; MG/3ML
3 SOLUTION RESPIRATORY (INHALATION)
Status: DISCONTINUED | OUTPATIENT
Start: 2019-11-24 | End: 2019-11-27 | Stop reason: HOSPADM

## 2019-11-24 RX ORDER — FLUTICASONE PROPIONATE 50 MCG
2 SPRAY, SUSPENSION (ML) NASAL DAILY
Status: DISCONTINUED | OUTPATIENT
Start: 2019-11-25 | End: 2019-11-27 | Stop reason: HOSPADM

## 2019-11-24 RX ORDER — INSULIN LISPRO 100 [IU]/ML
INJECTION, SOLUTION INTRAVENOUS; SUBCUTANEOUS
Status: DISCONTINUED | OUTPATIENT
Start: 2019-11-24 | End: 2019-11-27 | Stop reason: HOSPADM

## 2019-11-24 RX ORDER — GABAPENTIN 300 MG/1
300 CAPSULE ORAL 3 TIMES DAILY
Status: DISCONTINUED | OUTPATIENT
Start: 2019-11-24 | End: 2019-11-27 | Stop reason: HOSPADM

## 2019-11-24 RX ORDER — PANTOPRAZOLE SODIUM 40 MG/1
40 TABLET, DELAYED RELEASE ORAL
Status: DISCONTINUED | OUTPATIENT
Start: 2019-11-25 | End: 2019-11-27 | Stop reason: HOSPADM

## 2019-11-24 RX ORDER — SODIUM CHLORIDE 9 MG/ML
50 INJECTION, SOLUTION INTRAVENOUS CONTINUOUS
Status: DISCONTINUED | OUTPATIENT
Start: 2019-11-24 | End: 2019-11-26

## 2019-11-24 RX ORDER — INSULIN GLARGINE 100 [IU]/ML
30 INJECTION, SOLUTION SUBCUTANEOUS
Status: DISCONTINUED | OUTPATIENT
Start: 2019-11-24 | End: 2019-11-25

## 2019-11-24 RX ORDER — DEXTROSE MONOHYDRATE 25 G/50ML
12.5-25 INJECTION, SOLUTION INTRAVENOUS AS NEEDED
Status: DISCONTINUED | OUTPATIENT
Start: 2019-11-24 | End: 2019-11-27 | Stop reason: HOSPADM

## 2019-11-24 RX ORDER — ASPIRIN 81 MG/1
81 TABLET ORAL DAILY
Status: DISCONTINUED | OUTPATIENT
Start: 2019-11-25 | End: 2019-11-27 | Stop reason: HOSPADM

## 2019-11-24 RX ORDER — SERTRALINE HYDROCHLORIDE 50 MG/1
150 TABLET, FILM COATED ORAL DAILY
Status: DISCONTINUED | OUTPATIENT
Start: 2019-11-25 | End: 2019-11-27 | Stop reason: HOSPADM

## 2019-11-24 RX ORDER — SODIUM POLYSTYRENE SULFONATE 15 G/60ML
30 SUSPENSION ORAL; RECTAL
Status: DISCONTINUED | OUTPATIENT
Start: 2019-11-24 | End: 2019-11-24

## 2019-11-24 RX ORDER — SODIUM CHLORIDE 0.9 % (FLUSH) 0.9 %
5-40 SYRINGE (ML) INJECTION EVERY 8 HOURS
Status: DISCONTINUED | OUTPATIENT
Start: 2019-11-24 | End: 2019-11-27 | Stop reason: HOSPADM

## 2019-11-24 RX ORDER — ISOSORBIDE MONONITRATE 30 MG/1
30 TABLET, EXTENDED RELEASE ORAL DAILY
Status: DISCONTINUED | OUTPATIENT
Start: 2019-11-25 | End: 2019-11-27 | Stop reason: HOSPADM

## 2019-11-24 RX ORDER — MAGNESIUM SULFATE 100 %
4 CRYSTALS MISCELLANEOUS AS NEEDED
Status: DISCONTINUED | OUTPATIENT
Start: 2019-11-24 | End: 2019-11-27 | Stop reason: HOSPADM

## 2019-11-24 RX ORDER — HEPARIN SODIUM 5000 [USP'U]/ML
5000 INJECTION, SOLUTION INTRAVENOUS; SUBCUTANEOUS EVERY 8 HOURS
Status: DISCONTINUED | OUTPATIENT
Start: 2019-11-25 | End: 2019-11-25

## 2019-11-24 RX ORDER — LANOLIN ALCOHOL/MO/W.PET/CERES
800 CREAM (GRAM) TOPICAL 2 TIMES DAILY
Status: DISCONTINUED | OUTPATIENT
Start: 2019-11-25 | End: 2019-11-27 | Stop reason: HOSPADM

## 2019-11-24 RX ORDER — TRAMADOL HYDROCHLORIDE 50 MG/1
50 TABLET ORAL
Status: DISCONTINUED | OUTPATIENT
Start: 2019-11-24 | End: 2019-11-27 | Stop reason: HOSPADM

## 2019-11-24 RX ORDER — LANOLIN ALCOHOL/MO/W.PET/CERES
325 CREAM (GRAM) TOPICAL 2 TIMES DAILY WITH MEALS
Status: DISCONTINUED | OUTPATIENT
Start: 2019-11-25 | End: 2019-11-27 | Stop reason: HOSPADM

## 2019-11-24 RX ORDER — MONTELUKAST SODIUM 10 MG/1
10 TABLET ORAL DAILY
Status: DISCONTINUED | OUTPATIENT
Start: 2019-11-25 | End: 2019-11-27 | Stop reason: HOSPADM

## 2019-11-24 RX ORDER — CLOPIDOGREL BISULFATE 75 MG/1
75 TABLET ORAL DAILY
Status: DISCONTINUED | OUTPATIENT
Start: 2019-11-25 | End: 2019-11-27 | Stop reason: HOSPADM

## 2019-11-24 RX ORDER — CARVEDILOL 6.25 MG/1
6.25 TABLET ORAL 2 TIMES DAILY WITH MEALS
Status: DISCONTINUED | OUTPATIENT
Start: 2019-11-25 | End: 2019-11-25

## 2019-11-24 RX ORDER — SODIUM POLYSTYRENE SULFONATE 15 G/60ML
30 SUSPENSION ORAL; RECTAL
Status: COMPLETED | OUTPATIENT
Start: 2019-11-24 | End: 2019-11-24

## 2019-11-24 RX ORDER — NITROGLYCERIN 0.4 MG/1
0.4 TABLET SUBLINGUAL
Status: DISCONTINUED | OUTPATIENT
Start: 2019-11-24 | End: 2019-11-27 | Stop reason: HOSPADM

## 2019-11-24 RX ADMIN — SODIUM CHLORIDE 500 ML: 900 INJECTION, SOLUTION INTRAVENOUS at 19:10

## 2019-11-24 RX ADMIN — HUMAN INSULIN 10 UNITS: 100 INJECTION, SOLUTION SUBCUTANEOUS at 22:22

## 2019-11-24 RX ADMIN — GABAPENTIN 300 MG: 300 CAPSULE ORAL at 22:31

## 2019-11-24 RX ADMIN — SODIUM POLYSTYRENE SULFONATE 30 G: 15 SUSPENSION ORAL; RECTAL at 22:51

## 2019-11-24 RX ADMIN — DEXTROSE MONOHYDRATE 25 G: 25 INJECTION, SOLUTION INTRAVENOUS at 22:22

## 2019-11-24 NOTE — ED PROVIDER NOTES
EMERGENCY DEPARTMENT HISTORY AND PHYSICAL EXAM      Date: 11/24/2019  Patient Name: Lillian Ramirez  Patient Age and Sex: 78 y.o. female    History of Presenting Illness     Chief Complaint   Patient presents with   [facility] Fall     pt has had multiple falls in her apartment in the last 24 hours. She is brought by ems for that reason    Extremity Weakness     pt complains that her feet have been weak       History Provided By: Patient    Ability to gather history was limited by: None    HPI: Lillian Ramirez, 78 y.o. female with history of CAD, diabetes, CKD, hypertension, complains of frequent falls over the past 48 hours. States she fell approximately 3 or 4 times yesterday and twice today. All of been minor falls, she denies significant injuries. She does have some new pain in her left knee, aching and throbbing, 5 out of 10 severity, worsened by bending or bearing weight. She denies any hip pain, abdominal pain, chest pain or headache. She is not anticoagulated. She lives in a senior assisted living facility. She otherwise has been feeling well, denies any fevers or chills or illness. Pt denies any other alleviating or exacerbating factors. There are no other complaints, changes or physical findings at this time.      Past Medical History:   Diagnosis Date    Asthma     CAD (coronary artery disease)     Chronic kidney disease     Chronic obstructive pulmonary disease ([address]     H/O acute myocardial infarction     Heart failure ([address]     Hypertension      Past Surgical History:   Procedure Laterality Date    HX CHOLECYSTECTOMY      gall stones removed    HX GYN         PCP: Rosio, MD Shannan    Past History     Past Medical History:  Past Medical History:   Diagnosis Date    Asthma     CAD (coronary artery disease)     Chronic kidney disease     Chronic obstructive pulmonary disease ([address]     H/O acute myocardial infarction     Heart failure ([address]     Hypertension Past Surgical History:  Past Surgical History:   Procedure Laterality Date    HX CHOLECYSTECTOMY      gall stones removed    HX GYN         Family History:  Family History   Problem Relation Age of Onset    Diabetes Mother     Heart Attack Mother        Social History:  Social History     Tobacco Use    Smoking status: Former Smoker     Last attempt to quit: 12/15/2018     Years since quittin.9    Smokeless tobacco: Never Used   Substance Use Topics    Alcohol use: No    Drug use: No       Allergies: Allergies   Allergen Reactions    Garlic Shortness of Breath    Metformin Nausea and Vomiting    Pcn [Penicillins] Hives       Current Medications:  No current facility-administered medications on file prior to encounter. Current Outpatient Medications on File Prior to Encounter   Medication Sig Dispense Refill    bumetanide (BUMEX) 2 mg tablet Take 1 Tab by mouth two (2) times a day. 2 Tab 0    potassium chloride SR (K-TAB) 20 mEq tablet Take 3 Tabs by mouth two (2) times a day. Take with bumex (Patient taking differently: Take 80 mEq by mouth three (3) times daily. Take with bumex; Pt takes 4T AM, 2T noon, 2T PM) 180 Tab 0    magnesium oxide (MAG-OX) 400 mg tablet Take 2 Tabs by mouth two (2) times a day. Take with bumex 120 Tab 0    [DISCONTINUED] glipiZIDE (GLUCOTROL) 5 mg tablet Take 1 Tab by mouth daily (with dinner). 30 Tab 2    carvedilol (COREG) 6.25 mg tablet Take 6.25 mg by mouth two (2) times daily (with meals).  traMADol (ULTRAM) 50 mg tablet Take 50 mg by mouth three (3) times daily as needed (back pain). Pt taking only once a day.  nitroglycerin (NITROSTAT) 0.4 mg SL tablet by SubLINGual route every five (5) minutes as needed for Chest Pain.  tiotropium (SPIRIVA WITH HANDIHALER) 18 mcg inhalation capsule Take 1 Cap by inhalation daily.  spironolactone (ALDACTONE) 25 mg tablet Take 25 mg by mouth daily.       SITagliptin (JANUVIA) 100 mg tablet Take 100 mg by mouth daily.  isosorbide mononitrate ER (IMDUR) 30 mg tablet Take 30 mg by mouth daily.  ferrous sulfate 325 mg (65 mg iron) tablet Take 325 mg by mouth two (2) times daily (with meals).  insulin glargine (LANTUS) 100 unit/mL injection 30 Units by SubCUTAneous route nightly.  albuterol (PROVENTIL VENTOLIN) 2.5 mg /3 mL (0.083 %) nebulizer solution 3 mL by Nebulization route every four (4) hours as needed for Wheezing or Shortness of Breath. 24 Each 3    benzonatate (TESSALON) 100 mg capsule Take 1 Cap by mouth three (3) times daily as needed for Cough. 30 Cap 0    ipratropium (ATROVENT) 0.02 % nebulizer solution 2.5 mL by Nebulization route every six (6) hours as needed for Wheezing. 25 mL 3    aspirin delayed-release 81 mg tablet Take 81 mg by mouth daily.  atorvastatin (LIPITOR) 80 mg tablet Take 80 mg by mouth daily.  budesonide-formoterol (SYMBICORT) 160-4.5 mcg/actuation HFA inhaler Take 2 Puffs by inhalation two (2) times a day.  acetaminophen (TYLENOL) 500 mg tablet Take 500 mg by mouth every four (4) hours. Indications: BACK PAIN      omeprazole (PRILOSEC) 20 mg capsule Take 20 mg by mouth Before breakfast and dinner.  gabapentin (NEURONTIN) 300 mg capsule Take 300 mg by mouth three (3) times daily.  montelukast (SINGULAIR) 10 mg tablet Take 10 mg by mouth daily.  cholecalciferol (VITAMIN D3) 1,000 unit tablet Take 1,000 Units by mouth daily.  clopidogrel (PLAVIX) 75 mg tablet Take 75 mg by mouth daily.  fluticasone (FLONASE) 50 mcg/actuation nasal spray 2 Sprays by Both Nostrils route daily.  sertraline (ZOLOFT) 100 mg tablet Take 150 mg by mouth daily.  loratadine (CLARITIN) 10 mg tablet Take 10 mg by mouth daily as needed for Allergies or Rhinitis.  albuterol (PROVENTIL HFA, VENTOLIN HFA) 90 mcg/actuation inhaler Take 2 Puffs by inhalation every four (4) hours as needed for Wheezing or Shortness of Breath.          Review of Systems   Review of Systems   Constitutional: Negative for fatigue and fever. Neurological: Negative for headaches. All other systems reviewed and are negative. Physical Exam   Vital Signs  Patient Vitals for the past 24 hrs:   Temp Pulse Resp BP SpO2   11/24/19 2115 -- 79 -- (!) 113/39 97 %   11/24/19 2100 -- 94 -- 130/49 94 %   11/24/19 2045 -- 74 -- 120/50 100 %   11/24/19 2030 -- 76 -- 124/51 100 %   11/24/19 2015 -- 75 -- 117/49 100 %   11/24/19 2000 -- 76 -- 130/53 100 %   11/24/19 1946 -- 75 -- 110/50 97 %   11/24/19 1930 -- 73 -- (!) 108/39 98 %   11/24/19 1915 -- 71 -- 104/40 --   11/24/19 1909 -- 72 -- 100/54 --   11/24/19 1820 99.1 °F (37.3 °C) 72 20 (!) 79/54 100 %       Physical Exam  Vitals signs and nursing note reviewed. Constitutional:       General: She is not in acute distress. Appearance: She is well-developed. Comments:  Well-appearing, no distress   HENT:      Head: Normocephalic and atraumatic. No abrasion or contusion. Mouth/Throat:      Mouth: Mucous membranes are moist.   Eyes:      General:         Right eye: No discharge. Left eye: No discharge. Conjunctiva/sclera: Conjunctivae normal.   Neck:      Musculoskeletal: Normal range of motion and neck supple. Cardiovascular:      Rate and Rhythm: Normal rate and regular rhythm. Heart sounds: Normal heart sounds. No murmur. Pulmonary:      Effort: Pulmonary effort is normal. No respiratory distress. Breath sounds: Normal breath sounds. No wheezing. Abdominal:      General: There is no distension. Palpations: Abdomen is soft. Tenderness: There is no tenderness. Musculoskeletal:         General: No deformity. Right hip: Normal.      Left hip: Normal.      Left knee: She exhibits decreased range of motion and ecchymosis. She exhibits no deformity and no laceration. Tenderness found. Legs:    Skin:     General: Skin is warm and dry. Findings: No rash. Neurological:      Mental Status: She is alert and oriented to person, place, and time. Comments:  Alert and oriented   Psychiatric:         Behavior: Behavior normal.         Thought Content: Thought content normal.         Diagnostic Study Results   Labs  Recent Results (from the past 24 hour(s))   CBC WITH AUTOMATED DIFF    Collection Time: 11/24/19  7:10 PM   Result Value Ref Range    WBC 6.9 3.6 - 11.0 K/uL    RBC 3.05 (L) 3.80 - 5.20 M/uL    HGB 8.3 (L) 11.5 - 16.0 g/dL    HCT 27.6 (L) 35.0 - 47.0 %    MCV 90.5 80.0 - 99.0 FL    MCH 27.2 26.0 - 34.0 PG    MCHC 30.1 30.0 - 36.5 g/dL    RDW 14.6 (H) 11.5 - 14.5 %    PLATELET 903 506 - 070 K/uL    MPV 11.3 8.9 - 12.9 FL    NRBC 0.0 0  WBC    ABSOLUTE NRBC 0.00 0.00 - 0.01 K/uL    NEUTROPHILS 74 32 - 75 %    LYMPHOCYTES 13 12 - 49 %    MONOCYTES 10 5 - 13 %    EOSINOPHILS 3 0 - 7 %    BASOPHILS 0 0 - 1 %    IMMATURE GRANULOCYTES 0 0.0 - 0.5 %    ABS. NEUTROPHILS 5.1 1.8 - 8.0 K/UL    ABS. LYMPHOCYTES 0.9 0.8 - 3.5 K/UL    ABS. MONOCYTES 0.7 0.0 - 1.0 K/UL    ABS. EOSINOPHILS 0.2 0.0 - 0.4 K/UL    ABS. BASOPHILS 0.0 0.0 - 0.1 K/UL    ABS. IMM.  GRANS. 0.0 0.00 - 0.04 K/UL    DF AUTOMATED     METABOLIC PANEL, BASIC    Collection Time: 11/24/19  7:10 PM   Result Value Ref Range    Sodium 137 136 - 145 mmol/L    Potassium 5.9 (H) 3.5 - 5.1 mmol/L    Chloride 104 97 - 108 mmol/L    CO2 26 21 - 32 mmol/L    Anion gap 7 5 - 15 mmol/L    Glucose 133 (H) 65 - 100 mg/dL    BUN 61 (H) 6 - 20 MG/DL    Creatinine 3.01 (H) 0.55 - 1.02 MG/DL    BUN/Creatinine ratio 20 12 - 20      GFR est AA 19 (L) >60 ml/min/1.73m2    GFR est non-AA 15 (L) >60 ml/min/1.73m2    Calcium 8.6 8.5 - 10.1 MG/DL   URINALYSIS W/ RFLX MICROSCOPIC    Collection Time: 11/24/19  7:19 PM   Result Value Ref Range    Color YELLOW/STRAW      Appearance CLEAR CLEAR      Specific gravity 1.010 1.003 - 1.030      pH (UA) 5.0 5.0 - 8.0      Protein NEGATIVE  NEG mg/dL    Glucose NEGATIVE  NEG mg/dL Ketone NEGATIVE  NEG mg/dL    Bilirubin NEGATIVE  NEG      Blood NEGATIVE  NEG      Urobilinogen 0.2 0.2 - 1.0 EU/dL    Nitrites NEGATIVE  NEG      Leukocyte Esterase NEGATIVE  NEG         Radiologic Studies  XR KNEE LT 3 V   Final Result   IMPRESSION: No acute abnormality. CT Results  (Last 48 hours)    None        CXR Results  (Last 48 hours)    None          Procedures   Procedures    Medical Decision Making     Provider Notes (Medical Decision Making):   69-year-old female with mild generalized weakness and frequent falls over the past couple days. Her only physical complaint is some mild to moderate pain around the left knee, worsened by trying to ambulate. She denies any headache or head injury. No fevers. We will check basic screening laboratories, urinalysis, x-rays of the knee. Patient is noted to be hypotensive on arrival, will give fluids and reevaluate. Kory Caldwell MD  6:38 PM    BP improved markedly with NaCl 500 mL. She has no significant pain at this time. Noted to have worsening CKD, now with LEOLA creatinine 3.01 and potassium of 5.9. Will give insulin and D50, kayexalate. She expresses that she strongly opposes going on dialysis. Will likely improve with gentle hydration. David Bergman MD      Consult required? No      Medications Administered During ED Course:  Medications   insulin regular (NOVOLIN R, HUMULIN R) injection 8 Units (has no administration in time range)   dextrose (D50W) injection syrg 25 g (has no administration in time range)   sodium polystyrene (KAYEXALATE) 15 gram/60 mL oral suspension 30 g (has no administration in time range)   acetaminophen (TYLENOL) tablet 650 mg (has no administration in time range)   sodium chloride 0.9 % bolus infusion 500 mL (500 mL IntraVENous New Bag 11/24/19 1910)          Diagnosis and Disposition     Disposition:  Admitted    Clinical Impression:   1. LEOLA (acute kidney injury) ([facility]    2.  Hyperkalemia, diminished renal excretion    3. Mild dehydration        Attestation:  I personally performed the services described in this documentation on this date 11/24/2019 for patient Barak Marie. Chris Zamudio MD        I was the first provider for this patient on this visit. To the best of my ability I reviewed relevant prior medical records, electrocardiograms, laboratories, and radiologic studies. The patient's presenting problems were discussed, and the patient was in agreement with the care plan formulated and outlined with them. Chris Zamudio MD    Please note that this dictation was completed with Dragon voice recognition software. Quite often unanticipated grammatical, syntax, homophones, and other interpretive errors are inadvertently transcribed by the computer software. Please disregard these errors and excuse any errors that have escaped final proofreading.

## 2019-11-24 NOTE — ED TRIAGE NOTES
Pt has no complaints. Pt lives by herself and is unable to get herself off the floor when she falls.

## 2019-11-25 ENCOUNTER — APPOINTMENT (OUTPATIENT)
Dept: ULTRASOUND IMAGING | Age: 69
DRG: 683 | End: 2019-11-25
Attending: INTERNAL MEDICINE
Payer: MEDICARE

## 2019-11-25 LAB
ALBUMIN SERPL-MCNC: 2.9 G/DL (ref 3.5–5)
ALBUMIN/GLOB SERPL: 1 {RATIO} (ref 1.1–2.2)
ALP SERPL-CCNC: 97 U/L (ref 45–117)
ALT SERPL-CCNC: 19 U/L (ref 12–78)
ANION GAP SERPL CALC-SCNC: 8 MMOL/L (ref 5–15)
AST SERPL-CCNC: 16 U/L (ref 15–37)
BASOPHILS # BLD: 0 K/UL (ref 0–0.1)
BASOPHILS NFR BLD: 0 % (ref 0–1)
BILIRUB SERPL-MCNC: 0.9 MG/DL (ref 0.2–1)
BUN SERPL-MCNC: 46 MG/DL (ref 6–20)
BUN/CREAT SERPL: 24 (ref 12–20)
CALCIUM SERPL-MCNC: 7.1 MG/DL (ref 8.5–10.1)
CHLORIDE SERPL-SCNC: 113 MMOL/L (ref 97–108)
CK SERPL-CCNC: 78 U/L (ref 26–192)
CO2 SERPL-SCNC: 25 MMOL/L (ref 21–32)
CREAT SERPL-MCNC: 1.94 MG/DL (ref 0.55–1.02)
CREAT UR-MCNC: 62.6 MG/DL
DIFFERENTIAL METHOD BLD: ABNORMAL
EOSINOPHIL # BLD: 0.2 K/UL (ref 0–0.4)
EOSINOPHIL NFR BLD: 3 % (ref 0–7)
ERYTHROCYTE [DISTWIDTH] IN BLOOD BY AUTOMATED COUNT: 14.6 % (ref 11.5–14.5)
EST. AVERAGE GLUCOSE BLD GHB EST-MCNC: 180 MG/DL
GLOBULIN SER CALC-MCNC: 3 G/DL (ref 2–4)
GLUCOSE BLD STRIP.AUTO-MCNC: 122 MG/DL (ref 65–100)
GLUCOSE BLD STRIP.AUTO-MCNC: 122 MG/DL (ref 65–100)
GLUCOSE BLD STRIP.AUTO-MCNC: 162 MG/DL (ref 65–100)
GLUCOSE SERPL-MCNC: 118 MG/DL (ref 65–100)
HBA1C MFR BLD: 7.9 % (ref 4–5.6)
HCT VFR BLD AUTO: 21.7 % (ref 35–47)
HCT VFR BLD AUTO: 26.1 % (ref 35–47)
HGB BLD-MCNC: 6.2 G/DL (ref 11.5–16)
HGB BLD-MCNC: 7.5 G/DL (ref 11.5–16)
IMM GRANULOCYTES # BLD AUTO: 0 K/UL (ref 0–0.04)
IMM GRANULOCYTES NFR BLD AUTO: 0 % (ref 0–0.5)
LYMPHOCYTES # BLD: 0.8 K/UL (ref 0.8–3.5)
LYMPHOCYTES NFR BLD: 15 % (ref 12–49)
MAGNESIUM SERPL-MCNC: 2.1 MG/DL (ref 1.6–2.4)
MCH RBC QN AUTO: 26.5 PG (ref 26–34)
MCHC RBC AUTO-ENTMCNC: 28.6 G/DL (ref 30–36.5)
MCV RBC AUTO: 92.7 FL (ref 80–99)
MONOCYTES # BLD: 0.5 K/UL (ref 0–1)
MONOCYTES NFR BLD: 9 % (ref 5–13)
NEUTS SEG # BLD: 3.6 K/UL (ref 1.8–8)
NEUTS SEG NFR BLD: 73 % (ref 32–75)
NRBC # BLD: 0 K/UL (ref 0–0.01)
NRBC BLD-RTO: 0 PER 100 WBC
PHOSPHATE SERPL-MCNC: 3.8 MG/DL (ref 2.6–4.7)
PLATELET # BLD AUTO: 149 K/UL (ref 150–400)
PMV BLD AUTO: 11.3 FL (ref 8.9–12.9)
POTASSIUM SERPL-SCNC: 4 MMOL/L (ref 3.5–5.1)
PROT SERPL-MCNC: 5.9 G/DL (ref 6.4–8.2)
PROT UR-MCNC: 10 MG/DL (ref 0–11.9)
PROT/CREAT UR-RTO: 0.2
RBC # BLD AUTO: 2.34 M/UL (ref 3.8–5.2)
RBC MORPH BLD: ABNORMAL
RBC MORPH BLD: ABNORMAL
SERVICE CMNT-IMP: ABNORMAL
SODIUM SERPL-SCNC: 146 MMOL/L (ref 136–145)
SODIUM UR-SCNC: 37 MMOL/L
TSH SERPL DL<=0.05 MIU/L-ACNC: 0.83 UIU/ML (ref 0.36–3.74)
UUN UR-MCNC: 441 MG/DL
WBC # BLD AUTO: 5.1 K/UL (ref 3.6–11)

## 2019-11-25 PROCEDURE — 76770 US EXAM ABDO BACK WALL COMP: CPT

## 2019-11-25 PROCEDURE — 86923 COMPATIBILITY TEST ELECTRIC: CPT

## 2019-11-25 PROCEDURE — 94760 N-INVAS EAR/PLS OXIMETRY 1: CPT

## 2019-11-25 PROCEDURE — 84300 ASSAY OF URINE SODIUM: CPT

## 2019-11-25 PROCEDURE — 83036 HEMOGLOBIN GLYCOSYLATED A1C: CPT

## 2019-11-25 PROCEDURE — 36430 TRANSFUSION BLD/BLD COMPNT: CPT

## 2019-11-25 PROCEDURE — 84156 ASSAY OF PROTEIN URINE: CPT

## 2019-11-25 PROCEDURE — 80053 COMPREHEN METABOLIC PANEL: CPT

## 2019-11-25 PROCEDURE — 85025 COMPLETE CBC W/AUTO DIFF WBC: CPT

## 2019-11-25 PROCEDURE — 77010033678 HC OXYGEN DAILY

## 2019-11-25 PROCEDURE — 85018 HEMOGLOBIN: CPT

## 2019-11-25 PROCEDURE — 82962 GLUCOSE BLOOD TEST: CPT

## 2019-11-25 PROCEDURE — 84443 ASSAY THYROID STIM HORMONE: CPT

## 2019-11-25 PROCEDURE — 74011250636 HC RX REV CODE- 250/636: Performed by: INTERNAL MEDICINE

## 2019-11-25 PROCEDURE — 65660000000 HC RM CCU STEPDOWN

## 2019-11-25 PROCEDURE — 74011636637 HC RX REV CODE- 636/637: Performed by: INTERNAL MEDICINE

## 2019-11-25 PROCEDURE — 86900 BLOOD TYPING SEROLOGIC ABO: CPT

## 2019-11-25 PROCEDURE — 83735 ASSAY OF MAGNESIUM: CPT

## 2019-11-25 PROCEDURE — 36415 COLL VENOUS BLD VENIPUNCTURE: CPT

## 2019-11-25 PROCEDURE — 82550 ASSAY OF CK (CPK): CPT

## 2019-11-25 PROCEDURE — 84100 ASSAY OF PHOSPHORUS: CPT

## 2019-11-25 PROCEDURE — 74011250637 HC RX REV CODE- 250/637: Performed by: INTERNAL MEDICINE

## 2019-11-25 PROCEDURE — 84540 ASSAY OF URINE/UREA-N: CPT

## 2019-11-25 PROCEDURE — 30233N1 TRANSFUSION OF NONAUTOLOGOUS RED BLOOD CELLS INTO PERIPHERAL VEIN, PERCUTANEOUS APPROACH: ICD-10-PCS | Performed by: INTERNAL MEDICINE

## 2019-11-25 PROCEDURE — P9016 RBC LEUKOCYTES REDUCED: HCPCS

## 2019-11-25 PROCEDURE — P9047 ALBUMIN (HUMAN), 25%, 50ML: HCPCS | Performed by: INTERNAL MEDICINE

## 2019-11-25 RX ORDER — INSULIN GLARGINE 100 [IU]/ML
20 INJECTION, SOLUTION SUBCUTANEOUS
Status: DISCONTINUED | OUTPATIENT
Start: 2019-11-25 | End: 2019-11-27 | Stop reason: HOSPADM

## 2019-11-25 RX ORDER — ALBUMIN HUMAN 250 G/1000ML
25 SOLUTION INTRAVENOUS
Status: COMPLETED | OUTPATIENT
Start: 2019-11-25 | End: 2019-11-25

## 2019-11-25 RX ORDER — SODIUM CHLORIDE 9 MG/ML
250 INJECTION, SOLUTION INTRAVENOUS AS NEEDED
Status: DISCONTINUED | OUTPATIENT
Start: 2019-11-25 | End: 2019-11-27 | Stop reason: HOSPADM

## 2019-11-25 RX ORDER — HEPARIN SODIUM 5000 [USP'U]/ML
7500 INJECTION, SOLUTION INTRAVENOUS; SUBCUTANEOUS EVERY 8 HOURS
Status: DISCONTINUED | OUTPATIENT
Start: 2019-11-25 | End: 2019-11-27 | Stop reason: HOSPADM

## 2019-11-25 RX ORDER — ALBUMIN HUMAN 50 G/1000ML
25 SOLUTION INTRAVENOUS ONCE
Status: DISCONTINUED | OUTPATIENT
Start: 2019-11-25 | End: 2019-11-25

## 2019-11-25 RX ORDER — ALBUMIN HUMAN 250 G/1000ML
12.5 SOLUTION INTRAVENOUS ONCE
Status: DISPENSED | OUTPATIENT
Start: 2019-11-25 | End: 2019-11-25

## 2019-11-25 RX ORDER — CARVEDILOL 3.12 MG/1
3.12 TABLET ORAL 2 TIMES DAILY WITH MEALS
Status: DISCONTINUED | OUTPATIENT
Start: 2019-11-26 | End: 2019-11-27 | Stop reason: HOSPADM

## 2019-11-25 RX ADMIN — GABAPENTIN 300 MG: 300 CAPSULE ORAL at 22:34

## 2019-11-25 RX ADMIN — SODIUM CHLORIDE 500 ML: 900 INJECTION, SOLUTION INTRAVENOUS at 11:00

## 2019-11-25 RX ADMIN — SODIUM CHLORIDE 50 ML/HR: 900 INJECTION, SOLUTION INTRAVENOUS at 00:12

## 2019-11-25 RX ADMIN — FERROUS SULFATE TAB 325 MG (65 MG ELEMENTAL FE) 325 MG: 325 (65 FE) TAB at 16:03

## 2019-11-25 RX ADMIN — MONTELUKAST 10 MG: 10 TABLET, FILM COATED ORAL at 08:58

## 2019-11-25 RX ADMIN — Medication 10 ML: at 22:35

## 2019-11-25 RX ADMIN — FERROUS SULFATE TAB 325 MG (65 MG ELEMENTAL FE) 325 MG: 325 (65 FE) TAB at 08:59

## 2019-11-25 RX ADMIN — GABAPENTIN 300 MG: 300 CAPSULE ORAL at 16:03

## 2019-11-25 RX ADMIN — ACETAMINOPHEN 650 MG: 325 TABLET ORAL at 08:59

## 2019-11-25 RX ADMIN — Medication 800 MG: at 08:58

## 2019-11-25 RX ADMIN — SODIUM CHLORIDE 50 ML/HR: 900 INJECTION, SOLUTION INTRAVENOUS at 11:36

## 2019-11-25 RX ADMIN — ASPIRIN 81 MG: 81 TABLET, COATED ORAL at 08:58

## 2019-11-25 RX ADMIN — GABAPENTIN 300 MG: 300 CAPSULE ORAL at 08:59

## 2019-11-25 RX ADMIN — Medication 800 MG: at 17:01

## 2019-11-25 RX ADMIN — PANTOPRAZOLE SODIUM 40 MG: 40 TABLET, DELAYED RELEASE ORAL at 08:59

## 2019-11-25 RX ADMIN — HEPARIN SODIUM 5000 UNITS: 5000 INJECTION INTRAVENOUS; SUBCUTANEOUS at 06:37

## 2019-11-25 RX ADMIN — CARVEDILOL 6.25 MG: 6.25 TABLET, FILM COATED ORAL at 08:59

## 2019-11-25 RX ADMIN — PANTOPRAZOLE SODIUM 40 MG: 40 TABLET, DELAYED RELEASE ORAL at 16:03

## 2019-11-25 RX ADMIN — Medication 5 ML: at 14:00

## 2019-11-25 RX ADMIN — INSULIN GLARGINE 20 UNITS: 100 INJECTION, SOLUTION SUBCUTANEOUS at 22:34

## 2019-11-25 RX ADMIN — INSULIN LISPRO 2 UNITS: 100 INJECTION, SOLUTION INTRAVENOUS; SUBCUTANEOUS at 17:07

## 2019-11-25 RX ADMIN — HEPARIN SODIUM 7500 UNITS: 5000 INJECTION INTRAVENOUS; SUBCUTANEOUS at 16:02

## 2019-11-25 RX ADMIN — HEPARIN SODIUM 7500 UNITS: 5000 INJECTION INTRAVENOUS; SUBCUTANEOUS at 22:35

## 2019-11-25 RX ADMIN — ISOSORBIDE MONONITRATE 30 MG: 30 TABLET, EXTENDED RELEASE ORAL at 08:58

## 2019-11-25 RX ADMIN — INSULIN GLARGINE 30 UNITS: 100 INJECTION, SOLUTION SUBCUTANEOUS at 00:14

## 2019-11-25 RX ADMIN — SERTRALINE HYDROCHLORIDE 150 MG: 50 TABLET ORAL at 08:59

## 2019-11-25 RX ADMIN — CLOPIDOGREL BISULFATE 75 MG: 75 TABLET ORAL at 08:58

## 2019-11-25 RX ADMIN — TRAMADOL HYDROCHLORIDE 50 MG: 50 TABLET, FILM COATED ORAL at 16:02

## 2019-11-25 RX ADMIN — ALBUMIN (HUMAN) 25 G: 0.25 INJECTION, SOLUTION INTRAVENOUS at 11:03

## 2019-11-25 RX ADMIN — FLUTICASONE PROPIONATE 2 SPRAY: 50 SPRAY, METERED NASAL at 09:04

## 2019-11-25 RX ADMIN — Medication 10 ML: at 06:37

## 2019-11-25 RX ADMIN — ACETAMINOPHEN 650 MG: 325 TABLET ORAL at 20:42

## 2019-11-25 RX ADMIN — FLUTICASONE FUROATE AND VILANTEROL TRIFENATATE 1 PUFF: 100; 25 POWDER RESPIRATORY (INHALATION) at 09:04

## 2019-11-25 RX ADMIN — Medication 10 ML: at 00:17

## 2019-11-25 RX ADMIN — UMECLIDINIUM 1 PUFF: 62.5 AEROSOL, POWDER ORAL at 09:04

## 2019-11-25 NOTE — PROGRESS NOTES
PT order acknowledged. Spoke with RN who recommends waiting until she has blood transfusion.     Emily Brooks, PT

## 2019-11-25 NOTE — PROGRESS NOTES
Followed back to re-attempt OT evaluation, but patient is receiving her blood transfusion at this time. Will defer today and follow back tomorrow for OT evaluation.

## 2019-11-25 NOTE — CONSULTS
Roland Lenora         NAME:Wendy Noland Form  URI:[id number]   TSH:9/4/7398                         Pt seen  sunshine  ckd  Anemia  hyperkalemia    Julia Guzman MD  [facility]  [facility] [address], [address]  [place], [address]  Phone - [phone]         Fax - [phone] [facility] Office  [address] [place] [address]  Phone - [phone]        Fax - [phone]     [email or web address]

## 2019-11-25 NOTE — PROGRESS NOTES
Bedside report at change of shift given to Meron Miller RN, included, SBAR, KARDEX and LABS. Pt in stable condition, no distress noted.  Voided 200cc clear yellow urine and had 2 episodes of incontinence with purwik out of place

## 2019-11-25 NOTE — PROGRESS NOTES
Reason for Admission:   Hyperkalemia, falls, painful feet                 RRAT Score:          18           Plan for utilizing home health: May benefit                    Current Advanced Directive/Advance Care Plan: in epic                         Transition of Care Plan:                  Pt lives alone in a second floor apt for seniors with an elevator for transport.  Pt uses home Oxygen, 2 liters via nasal cannula at home.      In June, pt stated she had enrolled in the PACE program.Pt needs assist.  Kids are POA, has Medicare//generic insurance.  -need DDNR completed prior to d/c  -needs a PCP if does not have one.  -await therapy recs regarding SNF at d/c    Care Management Interventions  PCP Verified by CM: No  Transition of Care Consult (CM Consult): Discharge Planning  MyChart Signup: No  Discharge Durable Medical Equipment: No  Physical Therapy Consult: Yes  Occupational Therapy Consult: Yes  Speech Therapy Consult: No  Current Support Network: Lives Alone  Confirm Follow Up Transport: Family  Plan discussed with Pt/Family/Caregiver: No  Freedom of Choice Offered: Yes  Discharge Location  Discharge Placement: Home

## 2019-11-25 NOTE — INTERDISCIPLINARY ROUNDS
Interdisciplinary team rounds were held 11/25/2019 with the following team members:Care Management, Nursing, Pharmacy and Physician and the patient. Plan of care discussed. See clinical pathway and/or care plan for interventions and desired outcomes.

## 2019-11-25 NOTE — PROGRESS NOTES
Received report from [place], [address] [place] [address]. Pt transferred from ED, awake, alert and verbally responsive. Oriented x4, [email or web address]@[employer] nc, tolerating well, no signs of resp distress. Cheryn Schooner in use, skin intact, no open areas, some redness under abdominal pannus, area cleansed with soap and water, dried and cream applied. IVF's NS@ 50cc/hr initiated via 20g RFA. BS 99, Lantus given as ordered, pt resting ,bed at low level, call bell within reach, all standard and safety precautions maintained.

## 2019-11-25 NOTE — PROGRESS NOTES
Hospitalist Progress Note    NAME: Sudarshan Snyder   :  1950   MRN:  [id number]     Interim Hospital Summary: 78 y.o. female whom presented on 2019 with      Assessment / Plan:    Paged to evaluate patient for low BP 70s. Hypotension  Volume depletion  Anemia  -bolus 500cc NS. If still hypotensive, give albumin  -transfuse 1 unit pRBC. Obtained consent from patient. Denies melena or BRBPR. Likely anemia of renal disease  -hold Coreg and restart tomorrow at lower dose    LEOLA on CKD 4  -Renal US Right renal cysts. No mass, stone or hydronephrosis. No change  -volume expansion, monitor closely for CHF  -nephrology consult    DM2  -A1c 7.9  -decrease lantus, intake may be erratic with illness. SSI prn    CAD, s/p MI  Chronic systolic HF (TP64%)  -continue asa, plavix, imdur   -decrease coreg due to low BP  -monitor fluid balance closely    COPD  -not in acute exacerbation. Nebs prn  -singulair      40 or above Morbid obesity / Body mass index is 41.21 kg/m². Code status: DNR  Prophylaxis: Hep SQ  Recommended Disposition:  PT, OT, RN       Subjective:     Chief Complaint / Reason for Physician Visit  Follow up of LEOLA, hypotension, anemia, DM2, CHF  Chart reviewed in detail. Discussed with RN events overnight. Review of Systems:  Symptom Y/N Comments  Symptom Y/N Comments   Fever/Chills    Chest Pain     Poor Appetite    Edema     Cough    Abdominal Pain     Sputum    Joint Pain     SOB/LOVE    Pruritis/Rash     Nausea/vomit    Tolerating PT/OT     Diarrhea    Tolerating Diet     Constipation    Other       Could NOT obtain due to:      PO intake: No data found. Objective:     VITALS:   Last 24hrs VS reviewed since prior progress note.  Most recent are:  Patient Vitals for the past 24 hrs:   Temp Pulse Resp BP SpO2   19 1013 99 °F (37.2 °C) 78 19 (!) 80/43 --   19 0400 -- 78 -- -- --   19 0258 99.8 °F (37.7 °C) 74 19 98/40 100 %   11/25/19 0000 -- 74 -- -- --   11/24/19 2349 98.6 °F (37 °C) 74 19 107/67 98 %   11/24/19 2300 -- 81 -- 98/53 100 %   11/24/19 2245 -- 84 -- 120/48 98 %   11/24/19 2115 -- 79 -- (!) 113/39 97 %   11/24/19 2100 -- 94 -- 130/49 94 %   11/24/19 2045 -- 74 -- 120/50 100 %   11/24/19 2030 -- 76 -- 124/51 100 %   11/24/19 2015 -- 75 -- 117/49 100 %   11/24/19 2000 -- 76 -- 130/53 100 %   11/24/19 1946 -- 75 -- 110/50 97 %   11/24/19 1930 -- 73 -- (!) 108/39 98 %   11/24/19 1915 -- 71 -- 104/40 --   11/24/19 1909 -- 72 -- 100/54 --   11/24/19 1820 99.1 °F (37.3 °C) 72 20 (!) 79/54 100 %       Intake/Output Summary (Last 24 hours) at 11/25/2019 1043  Last data filed at 11/25/2019 8539  Gross per 24 hour   Intake --   Output 1 ml   Net -1 ml        PHYSICAL EXAM:  General: WD, WN. Alert, cooperative, no acute distress    EENT:  EOMI. Anicteric sclerae. MMM  Resp:  CTA bilaterally, no wheezing or rales. No accessory muscle use  CV:  Regular  rhythm,  No edema  GI:  Soft, Non distended, Non tender.  +Bowel sounds  Neurologic:  Alert and oriented X 3, normal speech, weak but non focal  Psych:   Good insight. Not anxious nor agitated  Skin:  No rashes. No jaundice    Reviewed most current lab test results and cultures  YES  Reviewed most current radiology test results   YES  Review and summation of old records today    NO  Reviewed patient's current orders and MAR    YES  PMH/SH reviewed - no change compared to H&P  ________________________________________________________________________  Care Plan discussed with:    Comments   Patient x    Family      RN x    Care Manager     Consultant  x Renal                     Multidiciplinary team rounds were held today with , nursing, pharmacist and clinical coordinator. Patient's plan of care was discussed; medications were reviewed and discharge planning was addressed.      ________________________________________________________________________  Total NON critical care TIME:   35  Minutes    Total CRITICAL CARE TIME Spent:   Minutes non procedure based      Comments   >50% of visit spent in counseling and coordination of care x     This includes time during multidisciplinary rounds if indicated above   ________________________________________________________________________  Ranjit Fu MD     Procedures: see electronic medical records for all procedures/Xrays and details which were not copied into this note but were reviewed prior to creation of Plan. LABS:  I reviewed today's most current labs and imaging studies.   Pertinent labs include:  Recent Labs     11/25/19 0324 11/24/19 1910   WBC 5.1 6.9   HGB 6.2* 8.3*   HCT 21.7* 27.6*   * 190     Recent Labs     11/25/19 0324 11/24/19 1910   * 137   K 4.0 5.9*   * 104   CO2 25 26   * 133*   BUN 46* 61*   CREA 1.94* 3.01*   CA 7.1* 8.6   MG 2.1  --    PHOS 3.8  --    ALB 2.9*  --    TBILI 0.9  --    SGOT 16  --    ALT 19  --

## 2019-11-25 NOTE — H&P
Hospitalist Admission Note    NAME: Elisha Morgan   :  1950   MRN:  [id number]     Date/Time:  2019 9:52 PM    Patient PCP: Shannan Avelar MD  ______________________________________________________________________  Given the patient's current clinical presentation, I have a high level of concern for decompensation if discharged from the emergency department. Complex decision making was performed, which includes reviewing the patient's available past medical records, laboratory results, and x-ray films. My assessment of this patient's clinical condition and my plan of care is as follows. Assessment / Plan:  Hyperkalemia: will give D50/insulin, kayexalate and monitor. D/c Spironolactone. LEOLA on CKD Stage 4: will start gentle hydration, check US to r/o obstruction, and monitor, get Nephrology evaluation. DM type 2 with hyperglycemia: hold po meds, c/w Lantus, place SSI, check HbA1C. Chronic Systolic  Heart Failure: EF 45%, global hypokinesis, not in acute failure at this time, hold diuretics, monitor I/O and weight. CAD s/p MI: c/w ASA and plavix, BB, monitor. No chest pain at this time. COPD: not in acute exacerbation, monitor, duonebs prn. Code Status: DNR as per patient  Surrogate Decision Maker: son  [id number]  DVT Prophylaxis: heparin  GI Prophylaxis: not indicated  Baseline: lives on her own, may need SNF on D/C      Subjective:   CHIEF COMPLAINT: \"I feel 5 times today\"    HISTORY OF PRESENT ILLNESS:     Elisha Morgan is a 78 y.o.  female  with history of CAD, diabetes, CKD, hypertension, complains of frequent falls over the past 48 hours. States she fell approximately 3 or 4 times yesterday and twice today. All of been minor falls, she denies significant injuries. She does have some new pain in her left knee, aching and throbbing, 5 out of 10 severity, worsened by bending or bearing weight.   She denies any hip pain, abdominal pain, chest pain or headache. She is not anticoagulated. She lives in a senior assisted living facility. At this time patient is lying in bed had fallen 5 times today, she feels weak and tired, denies chest pain, no SOB, no fever, no N/V no diarrhea, no urinary symptoms, no other associated symptoms. We were asked to admit for work up and evaluation of the above problems. Past Medical History:   Diagnosis Date    Asthma     CAD (coronary artery disease)     Chronic kidney disease     Chronic obstructive pulmonary disease ([address]     H/O acute myocardial infarction     Heart failure (HCC)     Hypertension         Past Surgical History:   Procedure Laterality Date    HX CHOLECYSTECTOMY      gall stones removed    HX GYN         Social History     Tobacco Use    Smoking status: Former Smoker     Last attempt to quit: 12/15/2018     Years since quittin.9    Smokeless tobacco: Never Used   Substance Use Topics    Alcohol use: No        Family History   Problem Relation Age of Onset    Diabetes Mother     Heart Attack Mother     Cancer Father      Allergies   Allergen Reactions    Garlic Shortness of Breath    Metformin Nausea and Vomiting    Pcn [Penicillins] Hives        Prior to Admission medications    Medication Sig Start Date End Date Taking? Authorizing Provider   bumetanide (BUMEX) 2 mg tablet Take 1 Tab by mouth two (2) times a day. 18   Olga Garcia MD   potassium chloride SR (K-TAB) 20 mEq tablet Take 3 Tabs by mouth two (2) times a day. Take with bumex  Patient taking differently: Take 80 mEq by mouth three (3) times daily. Take with bumex; Pt takes 4T AM, 2T noon, 2T PM 18   Olga Garcia MD   magnesium oxide (MAG-OX) 400 mg tablet Take 2 Tabs by mouth two (2) times a day. Take with bumex 18   Olga Garcia MD   carvedilol (COREG) 6.25 mg tablet Take 6.25 mg by mouth two (2) times daily (with meals).     Provider, Historical   traMADol (ULTRAM) 50 mg tablet Take 50 mg by mouth three (3) times daily as needed (back pain). Pt taking only once a day. Provider, Historical   nitroglycerin (NITROSTAT) 0.4 mg SL tablet by SubLINGual route every five (5) minutes as needed for Chest Pain. Provider, Historical   tiotropium (SPIRIVA WITH HANDIHALER) 18 mcg inhalation capsule Take 1 Cap by inhalation daily. Provider, Historical   spironolactone (ALDACTONE) 25 mg tablet Take 25 mg by mouth daily. Provider, Historical   SITagliptin (JANUVIA) 100 mg tablet Take 100 mg by mouth daily. Provider, Historical   isosorbide mononitrate ER (IMDUR) 30 mg tablet Take 30 mg by mouth daily. Provider, Historical   ferrous sulfate 325 mg (65 mg iron) tablet Take 325 mg by mouth two (2) times daily (with meals). Provider, Historical   insulin glargine (LANTUS) 100 unit/mL injection 30 Units by SubCUTAneous route nightly. Provider, Historical   albuterol (PROVENTIL VENTOLIN) 2.5 mg /3 mL (0.083 %) nebulizer solution 3 mL by Nebulization route every four (4) hours as needed for Wheezing or Shortness of Breath. 3/15/16   Mark Garcia MD   benzonatate (TESSALON) 100 mg capsule Take 1 Cap by mouth three (3) times daily as needed for Cough. 3/15/16   Mark Garcia MD   ipratropium (ATROVENT) 0.02 % nebulizer solution 2.5 mL by Nebulization route every six (6) hours as needed for Wheezing. 3/15/16   Mark Garcia MD   aspirin delayed-release 81 mg tablet Take 81 mg by mouth daily. Provider, Historical   atorvastatin (LIPITOR) 80 mg tablet Take 80 mg by mouth daily. Provider, Historical   budesonide-formoterol (SYMBICORT) 160-4.5 mcg/actuation HFA inhaler Take 2 Puffs by inhalation two (2) times a day. Provider, Historical   acetaminophen (TYLENOL) 500 mg tablet Take 500 mg by mouth every four (4) hours.  Indications: BACK PAIN    Provider, Historical   omeprazole (PRILOSEC) 20 mg capsule Take 20 mg by mouth Before breakfast and dinner. Provider, Historical   gabapentin (NEURONTIN) 300 mg capsule Take 300 mg by mouth three (3) times daily. Provider, Historical   montelukast (SINGULAIR) 10 mg tablet Take 10 mg by mouth daily. Provider, Historical   cholecalciferol (VITAMIN D3) 1,000 unit tablet Take 1,000 Units by mouth daily. Provider, Historical   clopidogrel (PLAVIX) 75 mg tablet Take 75 mg by mouth daily. Provider, Historical   fluticasone (FLONASE) 50 mcg/actuation nasal spray 2 Sprays by Both Nostrils route daily. Provider, Historical   sertraline (ZOLOFT) 100 mg tablet Take 150 mg by mouth daily. Provider, Historical   loratadine (CLARITIN) 10 mg tablet Take 10 mg by mouth daily as needed for Allergies or Rhinitis. Other, MD Shannan   albuterol (PROVENTIL HFA, VENTOLIN HFA) 90 mcg/actuation inhaler Take 2 Puffs by inhalation every four (4) hours as needed for Wheezing or Shortness of Breath. Other, MD Shannan       REVIEW OF SYSTEMS:     I am not able to complete the review of systems because:    The patient is intubated and sedated    The patient has altered mental status due to his acute medical problems    The patient has baseline aphasia from prior stroke(s)    The patient has baseline dementia and is not reliable historian    The patient is in acute medical distress and unable to provide information           Total of 12 systems reviewed as follows:       POSITIVE= underlined text  Negative = text not underlined  General:  fever, chills, sweats, generalized weakness, weight loss/gain,      loss of appetite   Eyes:    blurred vision, eye pain, loss of vision, double vision  ENT:    rhinorrhea, pharyngitis   Respiratory:   cough, sputum production, SOB, LOVE, wheezing, pleuritic pain   Cardiology:   chest pain, palpitations, orthopnea, PND, edema, syncope   Gastrointestinal:  abdominal pain , N/V, diarrhea, dysphagia, constipation, bleeding   Genitourinary:  frequency, urgency, dysuria, hematuria, incontinence   Muskuloskeletal :  arthralgia, myalgia, back pain  Hematology:  easy bruising, nose or gum bleeding, lymphadenopathy   Dermatological: rash, ulceration, pruritis, color change / jaundice  Endocrine:   hot flashes or polydipsia   Neurological:  headache, dizziness, confusion, focal weakness, paresthesia,     Speech difficulties, memory loss, gait difficulty  Psychological: Feelings of anxiety, depression, agitation    Objective:   VITALS:    Visit Vitals  BP (!) 113/39   Pulse 79   Temp 99.1 °F (37.3 °C)   Resp 20   Ht 5' (1.524 m)   Wt 95.7 kg (211 lb)   SpO2 97%   BMI 41.21 kg/m²       PHYSICAL EXAM:    General:    Alert, cooperative, in pain, appears stated age. HEENT: Atraumatic, anicteric sclerae, pink conjunctivae     No oral ulcers, mucosa moist, throat clear, dentition poor  Neck:  Supple, symmetrical,  thyroid: non tender  Lungs:   Coarse BS  Chest wall:  No tenderness  No Accessory muscle use. Heart:   Regular  rhythm,  No  murmur   No edema  Abdomen:   Soft, non-tender. Not distended. Bowel sounds normal  Extremities: No cyanosis. No clubbing,      Skin turgor normal, Capillary refill normal, Radial  pulse 2+  Skin:     Not pale. Not Jaundiced  No rashes   Psych:  Good insight. Not depressed. Not anxious or agitated. Neurologic: EOMs intact. No facial asymmetry. No aphasia or slurred speech. Symmetrical strength, Sensation grossly intact.  Alert and oriented X 4.     _______________________________________________________________________  Care Plan discussed with:    Comments   Patient y    Family  y son   RN y    Care Manager                    Consultant:      _______________________________________________________________________  Expected  Disposition:   Home with Family    HH/PT/OT/RN    SNF/LTC y   [de-identified]    ________________________________________________________________________  TOTAL TIME:  61 Minutes    Critical Care Provided     Minutes non procedure based      Comments    y Reviewed previous records   >50% of visit spent in counseling and coordination of care y Discussion with patient and/or family and questions answered       ________________________________________________________________________  Signed: David Mistry MD    Procedures: see electronic medical records for all procedures/Xrays and details which were not copied into this note but were reviewed prior to creation of Plan. LAB DATA REVIEWED:    Recent Results (from the past 24 hour(s))   CBC WITH AUTOMATED DIFF    Collection Time: 11/24/19  7:10 PM   Result Value Ref Range    WBC 6.9 3.6 - 11.0 K/uL    RBC 3.05 (L) 3.80 - 5.20 M/uL    HGB 8.3 (L) 11.5 - 16.0 g/dL    HCT 27.6 (L) 35.0 - 47.0 %    MCV 90.5 80.0 - 99.0 FL    MCH 27.2 26.0 - 34.0 PG    MCHC 30.1 30.0 - 36.5 g/dL    RDW 14.6 (H) 11.5 - 14.5 %    PLATELET 215 944 - 584 K/uL    MPV 11.3 8.9 - 12.9 FL    NRBC 0.0 0  WBC    ABSOLUTE NRBC 0.00 0.00 - 0.01 K/uL    NEUTROPHILS 74 32 - 75 %    LYMPHOCYTES 13 12 - 49 %    MONOCYTES 10 5 - 13 %    EOSINOPHILS 3 0 - 7 %    BASOPHILS 0 0 - 1 %    IMMATURE GRANULOCYTES 0 0.0 - 0.5 %    ABS. NEUTROPHILS 5.1 1.8 - 8.0 K/UL    ABS. LYMPHOCYTES 0.9 0.8 - 3.5 K/UL    ABS. MONOCYTES 0.7 0.0 - 1.0 K/UL    ABS. EOSINOPHILS 0.2 0.0 - 0.4 K/UL    ABS. BASOPHILS 0.0 0.0 - 0.1 K/UL    ABS. IMM.  GRANS. 0.0 0.00 - 0.04 K/UL    DF AUTOMATED     METABOLIC PANEL, BASIC    Collection Time: 11/24/19  7:10 PM   Result Value Ref Range    Sodium 137 136 - 145 mmol/L    Potassium 5.9 (H) 3.5 - 5.1 mmol/L    Chloride 104 97 - 108 mmol/L    CO2 26 21 - 32 mmol/L    Anion gap 7 5 - 15 mmol/L    Glucose 133 (H) 65 - 100 mg/dL    BUN 61 (H) 6 - 20 MG/DL    Creatinine 3.01 (H) 0.55 - 1.02 MG/DL    BUN/Creatinine ratio 20 12 - 20      GFR est AA 19 (L) >60 ml/min/1.73m2    GFR est non-AA 15 (L) >60 ml/min/1.73m2    Calcium 8.6 8.5 - 10.1 MG/DL   URINALYSIS W/ RFLX MICROSCOPIC    Collection Time: 11/24/19 7:19 PM   Result Value Ref Range    Color YELLOW/STRAW      Appearance CLEAR CLEAR      Specific gravity 1.010 1.003 - 1.030      pH (UA) 5.0 5.0 - 8.0      Protein NEGATIVE  NEG mg/dL    Glucose NEGATIVE  NEG mg/dL    Ketone NEGATIVE  NEG mg/dL    Bilirubin NEGATIVE  NEG      Blood NEGATIVE  NEG      Urobilinogen 0.2 0.2 - 1.0 EU/dL    Nitrites NEGATIVE  NEG      Leukocyte Esterase NEGATIVE  NEG

## 2019-11-26 LAB
ABO + RH BLD: NORMAL
ANION GAP SERPL CALC-SCNC: 7 MMOL/L (ref 5–15)
BASOPHILS # BLD: 0 K/UL (ref 0–0.1)
BASOPHILS NFR BLD: 1 % (ref 0–1)
BLD PROD TYP BPU: NORMAL
BLD PROD TYP BPU: NORMAL
BLOOD GROUP ANTIBODIES SERPL: NORMAL
BPU ID: NORMAL
BPU ID: NORMAL
BUN SERPL-MCNC: 39 MG/DL (ref 6–20)
BUN/CREAT SERPL: 23 (ref 12–20)
CALCIUM SERPL-MCNC: 8.6 MG/DL (ref 8.5–10.1)
CHLORIDE SERPL-SCNC: 104 MMOL/L (ref 97–108)
CO2 SERPL-SCNC: 30 MMOL/L (ref 21–32)
CREAT SERPL-MCNC: 1.73 MG/DL (ref 0.55–1.02)
CROSSMATCH RESULT,%XM: NORMAL
CROSSMATCH RESULT,%XM: NORMAL
DIFFERENTIAL METHOD BLD: ABNORMAL
EOSINOPHIL # BLD: 0.3 K/UL (ref 0–0.4)
EOSINOPHIL NFR BLD: 5 % (ref 0–7)
ERYTHROCYTE [DISTWIDTH] IN BLOOD BY AUTOMATED COUNT: 14.8 % (ref 11.5–14.5)
GLUCOSE BLD STRIP.AUTO-MCNC: 106 MG/DL (ref 65–100)
GLUCOSE BLD STRIP.AUTO-MCNC: 121 MG/DL (ref 65–100)
GLUCOSE BLD STRIP.AUTO-MCNC: 124 MG/DL (ref 65–100)
GLUCOSE BLD STRIP.AUTO-MCNC: 168 MG/DL (ref 65–100)
GLUCOSE SERPL-MCNC: 96 MG/DL (ref 65–100)
HCT VFR BLD AUTO: 31.6 % (ref 35–47)
HGB BLD-MCNC: 9.6 G/DL (ref 11.5–16)
IMM GRANULOCYTES # BLD AUTO: 0 K/UL (ref 0–0.04)
IMM GRANULOCYTES NFR BLD AUTO: 0 % (ref 0–0.5)
IRON SATN MFR SERPL: 21 % (ref 20–50)
IRON SERPL-MCNC: 57 UG/DL (ref 35–150)
LYMPHOCYTES # BLD: 1.1 K/UL (ref 0.8–3.5)
LYMPHOCYTES NFR BLD: 19 % (ref 12–49)
MCH RBC QN AUTO: 27.4 PG (ref 26–34)
MCHC RBC AUTO-ENTMCNC: 30.4 G/DL (ref 30–36.5)
MCV RBC AUTO: 90.3 FL (ref 80–99)
MONOCYTES # BLD: 0.5 K/UL (ref 0–1)
MONOCYTES NFR BLD: 8 % (ref 5–13)
NEUTS SEG # BLD: 4 K/UL (ref 1.8–8)
NEUTS SEG NFR BLD: 67 % (ref 32–75)
NRBC # BLD: 0 K/UL (ref 0–0.01)
NRBC BLD-RTO: 0 PER 100 WBC
PLATELET # BLD AUTO: 170 K/UL (ref 150–400)
PMV BLD AUTO: 11.2 FL (ref 8.9–12.9)
POTASSIUM SERPL-SCNC: 3.7 MMOL/L (ref 3.5–5.1)
RBC # BLD AUTO: 3.5 M/UL (ref 3.8–5.2)
SERVICE CMNT-IMP: ABNORMAL
SODIUM SERPL-SCNC: 141 MMOL/L (ref 136–145)
SPECIMEN EXP DATE BLD: NORMAL
STATUS OF UNIT,%ST: NORMAL
STATUS OF UNIT,%ST: NORMAL
TIBC SERPL-MCNC: 270 UG/DL (ref 250–450)
UNIT DIVISION, %UDIV: 0
UNIT DIVISION, %UDIV: 0
WBC # BLD AUTO: 5.9 K/UL (ref 3.6–11)

## 2019-11-26 PROCEDURE — 80048 BASIC METABOLIC PNL TOTAL CA: CPT

## 2019-11-26 PROCEDURE — 85025 COMPLETE CBC W/AUTO DIFF WBC: CPT

## 2019-11-26 PROCEDURE — 97165 OT EVAL LOW COMPLEX 30 MIN: CPT | Performed by: OCCUPATIONAL THERAPIST

## 2019-11-26 PROCEDURE — 82962 GLUCOSE BLOOD TEST: CPT

## 2019-11-26 PROCEDURE — 97162 PT EVAL MOD COMPLEX 30 MIN: CPT

## 2019-11-26 PROCEDURE — 74011250636 HC RX REV CODE- 250/636: Performed by: INTERNAL MEDICINE

## 2019-11-26 PROCEDURE — 74011250637 HC RX REV CODE- 250/637: Performed by: INTERNAL MEDICINE

## 2019-11-26 PROCEDURE — 74011636637 HC RX REV CODE- 636/637: Performed by: INTERNAL MEDICINE

## 2019-11-26 PROCEDURE — 97530 THERAPEUTIC ACTIVITIES: CPT

## 2019-11-26 PROCEDURE — 97530 THERAPEUTIC ACTIVITIES: CPT | Performed by: OCCUPATIONAL THERAPIST

## 2019-11-26 PROCEDURE — 65660000000 HC RM CCU STEPDOWN

## 2019-11-26 PROCEDURE — 83540 ASSAY OF IRON: CPT

## 2019-11-26 PROCEDURE — 77030038269 HC DRN EXT URIN PURWCK BARD -A

## 2019-11-26 PROCEDURE — 94760 N-INVAS EAR/PLS OXIMETRY 1: CPT

## 2019-11-26 PROCEDURE — 97535 SELF CARE MNGMENT TRAINING: CPT | Performed by: OCCUPATIONAL THERAPIST

## 2019-11-26 PROCEDURE — 97116 GAIT TRAINING THERAPY: CPT

## 2019-11-26 PROCEDURE — 36415 COLL VENOUS BLD VENIPUNCTURE: CPT

## 2019-11-26 PROCEDURE — 77010033678 HC OXYGEN DAILY

## 2019-11-26 RX ADMIN — TRAMADOL HYDROCHLORIDE 50 MG: 50 TABLET, FILM COATED ORAL at 09:11

## 2019-11-26 RX ADMIN — EPOETIN ALFA-EPBX 4000 UNITS: 4000 INJECTION, SOLUTION INTRAVENOUS; SUBCUTANEOUS at 22:11

## 2019-11-26 RX ADMIN — PANTOPRAZOLE SODIUM 40 MG: 40 TABLET, DELAYED RELEASE ORAL at 08:59

## 2019-11-26 RX ADMIN — PANTOPRAZOLE SODIUM 40 MG: 40 TABLET, DELAYED RELEASE ORAL at 16:26

## 2019-11-26 RX ADMIN — GABAPENTIN 300 MG: 300 CAPSULE ORAL at 22:12

## 2019-11-26 RX ADMIN — CARVEDILOL 3.12 MG: 3.12 TABLET, FILM COATED ORAL at 08:59

## 2019-11-26 RX ADMIN — SERTRALINE HYDROCHLORIDE 150 MG: 50 TABLET ORAL at 08:59

## 2019-11-26 RX ADMIN — MONTELUKAST 10 MG: 10 TABLET, FILM COATED ORAL at 08:59

## 2019-11-26 RX ADMIN — UMECLIDINIUM 1 PUFF: 62.5 AEROSOL, POWDER ORAL at 09:00

## 2019-11-26 RX ADMIN — EPOETIN ALFA-EPBX 10000 UNITS: 10000 INJECTION, SOLUTION INTRAVENOUS; SUBCUTANEOUS at 22:11

## 2019-11-26 RX ADMIN — Medication 800 MG: at 08:59

## 2019-11-26 RX ADMIN — FLUTICASONE FUROATE AND VILANTEROL TRIFENATATE 1 PUFF: 100; 25 POWDER RESPIRATORY (INHALATION) at 09:00

## 2019-11-26 RX ADMIN — FLUTICASONE PROPIONATE 2 SPRAY: 50 SPRAY, METERED NASAL at 09:00

## 2019-11-26 RX ADMIN — ASPIRIN 81 MG: 81 TABLET, COATED ORAL at 08:59

## 2019-11-26 RX ADMIN — ISOSORBIDE MONONITRATE 30 MG: 30 TABLET, EXTENDED RELEASE ORAL at 08:59

## 2019-11-26 RX ADMIN — FERROUS SULFATE TAB 325 MG (65 MG ELEMENTAL FE) 325 MG: 325 (65 FE) TAB at 16:26

## 2019-11-26 RX ADMIN — Medication 10 ML: at 14:00

## 2019-11-26 RX ADMIN — HEPARIN SODIUM 7500 UNITS: 5000 INJECTION INTRAVENOUS; SUBCUTANEOUS at 16:26

## 2019-11-26 RX ADMIN — CLOPIDOGREL BISULFATE 75 MG: 75 TABLET ORAL at 08:59

## 2019-11-26 RX ADMIN — CARVEDILOL 3.12 MG: 3.12 TABLET, FILM COATED ORAL at 16:26

## 2019-11-26 RX ADMIN — FERROUS SULFATE TAB 325 MG (65 MG ELEMENTAL FE) 325 MG: 325 (65 FE) TAB at 08:59

## 2019-11-26 RX ADMIN — Medication 10 ML: at 22:12

## 2019-11-26 RX ADMIN — ACETAMINOPHEN 650 MG: 325 TABLET ORAL at 05:23

## 2019-11-26 RX ADMIN — INSULIN LISPRO 2 UNITS: 100 INJECTION, SOLUTION INTRAVENOUS; SUBCUTANEOUS at 12:10

## 2019-11-26 RX ADMIN — Medication 800 MG: at 17:58

## 2019-11-26 RX ADMIN — GABAPENTIN 300 MG: 300 CAPSULE ORAL at 16:26

## 2019-11-26 RX ADMIN — HEPARIN SODIUM 7500 UNITS: 5000 INJECTION INTRAVENOUS; SUBCUTANEOUS at 05:19

## 2019-11-26 RX ADMIN — GABAPENTIN 300 MG: 300 CAPSULE ORAL at 08:59

## 2019-11-26 RX ADMIN — Medication 10 ML: at 05:20

## 2019-11-26 RX ADMIN — INSULIN GLARGINE 20 UNITS: 100 INJECTION, SOLUTION SUBCUTANEOUS at 22:11

## 2019-11-26 NOTE — PROGRESS NOTES
Occupational Therapy  OT consult received, chart reviewed. Patient was seen this morning for OT evaluation. Recommend resuming home health through [facility] upon discharge. Full evaluation note to follow.

## 2019-11-26 NOTE — PROGRESS NOTES
Interdisciplinary team rounds were held 11/26/2019 with the following team members:Care Management, Nursing, Pharmacy and Physician and the patient. Plan of care discussed. See clinical pathway and/or care plan for interventions and desired outcomes.

## 2019-11-26 NOTE — PROGRESS NOTES
Problem: Mobility Impaired (Adult and Pediatric)  Goal: *Acute Goals and Plan of Care (Insert Text)  Description  FUNCTIONAL STATUS PRIOR TO ADMISSION: Patient was modified independent using a rollator for functional mobility. Pt was on 2L of oxygen at baseline    [address]: The patient lived alone with home health aides M-F to provide assistance. [facility] 3 days/[facility]    Physical Therapy Goals  Initiated 11/26/2019  1. Patient will move from supine to sit and sit to supine  in bed with modified independence within 7 day(s). 2.  Patient will transfer from bed to chair and chair to bed with modified independence using the least restrictive device within 7 day(s). 3.  Patient will perform sit to stand with modified independence within 7 day(s). 4.  Patient will ambulate with modified independence for 75 feet with the least restrictive device within 7 day(s). Outcome: Progressing Towards Goal   PHYSICAL THERAPY EVALUATION  Patient: Ervin Birch (86 y.o. female)  Date: 11/26/2019  Primary Diagnosis: Hyperkalemia [E87.5]        Precautions:   Fall, DNR      ASSESSMENT  Based on the objective data described below, the patient was admitted on 11/24 with multiple falls and found to have low hemoglobin and BP. Pt received a blood transfusion and BP now stabilized. Pt tolerated evaluation well. Pt able to complete transfers with CGA-standby assist with RW and gait training over 30 feet with RW with CGA. Pt demonstrated wide JUAQUIN, no LOB noted or instability. Pt remained seated in chair with all needs met. Recommend return home with continued home health aides and therapy at [facility] program upon discharge. Current Level of Function Impacting Discharge (mobility/balance): CGA bed mobility, transfers with RW, and gait training x 30 feet with RW (2 L of oxygen)    Functional Outcome Measure:   The patient scored Total Score: 21/28 on the Tinetti outcome measure which is indicative of moderate fall risk. Patient will benefit from skilled therapy intervention to address the above noted impairments. PLAN :  Recommendations and Planned Interventions: bed mobility training, transfer training, gait training, therapeutic exercises, neuromuscular re-education, patient and family training/education, and therapeutic activities      Frequency/Duration: Patient will be followed by physical therapy:  4 times a week to address goals. Recommendation for discharge: (in order for the patient to meet his/her long term goals)  Return home with continued therapy services provided at [facility] program and home health aides    This discharge recommendation:  Has been made in collaboration with the attending provider and/or case management    IF patient discharges home will need the following DME: none         SUBJECTIVE:   Patient stated I don't know what happened to me. I couldn't stop falling.     OBJECTIVE DATA SUMMARY:   HISTORY:    Past Medical History:   Diagnosis Date    Asthma     CAD (coronary artery disease)     Chronic kidney disease     Chronic obstructive pulmonary disease (HCC)     DM (diabetes mellitus), type 2 ([facility] [address]     H/O acute myocardial infarction     Heart failure ([facility] [address]     Hypertension      Past Surgical History:   Procedure Laterality Date    HX CHOLECYSTECTOMY      gall stones removed    HX GYN         Home Situation  Home Environment: Independent living  # Steps to Enter: 0  One/Two Story Residence: One story  Living Alone: Yes  Support Systems: (Home health aide M-F)  Patient Expects to be Discharged to[de-identified] Apartment  Current DME Used/Available at Home: Oxygen, portable, Walker, rollator, Kathlee Heady, straight, Shower chair, Commode, bedside, Grab bars(2L at baseline)  Tub or Shower Type: Shower    EXAMINATION/PRESENTATION/DECISION MAKING:   Critical Behavior:              Hearing:   Auditory  Auditory Impairment: None  Skin:    Edema:   Range Of Motion:  AROM: Within functional limits                       Strength:    Strength: Within functional limits                    Tone & Sensation:                                  Coordination:     Vision:      Functional Mobility:  Bed Mobility:     Supine to Sit: Contact guard assistance; Additional time        Transfers:  Sit to Stand: Stand-by assistance  Stand to Sit: Stand-by assistance                       Balance:   Sitting: Intact  Standing: With support; Impaired  Standing - Static: Good  Standing - Dynamic : Fair  Ambulation/Gait Training:  Distance (ft): 30 Feet (ft)  Assistive Device: Gait belt;Walker, rolling  Ambulation - Level of Assistance: Stand-by assistance;Contact guard assistance        Gait Abnormalities: Decreased step clearance;Trunk sway increased        Base of Support: Widened     Speed/Felisha: Pace decreased (<100 feet/min); Shuffled  Step Length: Right shortened;Left shortened             Functional Measure:  Tinetti test:    Sitting Balance: 1  Arises: 1  Attempts to Rise: 2  Immediate Standing Balance: 1  Standing Balance: 1  Nudged: 2  Eyes Closed: 1  Turn 360 Degrees - Continuous/Discontinuous: 1  Turn 360 Degrees - Steady/Unsteady: 1  Sitting Down: 1  Balance Score: 12 Balance total score  Indication of Gait: 1  R Step Length/Height: 1  L Step Length/Height: 1  R Foot Clearance: 1  L Foot Clearance: 1  Step Symmetry: 1  Step Continuity: 1  Path: 1  Trunk: 1  Walking Time: 0  Gait Score: 9 Gait total score  Total Score: 21/28 Overall total score         Tinetti Tool Score Risk of Falls  <19 = High Fall Risk  19-24 = Moderate Fall Risk  25-28 = Low Fall Risk  Tinetti ME. Performance-Oriented Assessment of Mobility Problems in Elderly Patients. [address]; [id number].  (Scoring Description: PT Bulletin Feb. 10, 1993)    Older adults: Kenneth Addison et al, 2009; n = [address] [employer] elderly evaluated with ABC, JUANIS, ADL, and IADL)  · Mean JUANIS score for males aged 69-68 years = 26.21(3.40)  · Mean JUANIS score for females age 65-79 years = 25.16(4.30)  · Mean JUANIS score for males over 80 years = 23.29(6.02)  · Mean JUANIS score for females over 80 years = 17.20(8.32)        Based on the above components, the patient evaluation is determined to be of the following complexity level: MEDIUM    Pain Rating:  Pt c/o L knee pain--denied falling on L knee    Activity Tolerance:   Good  Please refer to the flowsheet for vital signs taken during this treatment. After treatment patient left in no apparent distress:   Sitting in chair and Call bell within reach    COMMUNICATION/EDUCATION:   The patients plan of care was discussed with: Occupational Therapist, Registered Nurse, and . Fall prevention education was provided and the patient/caregiver indicated understanding., Patient/family have participated as able in goal setting and plan of care. , and Patient/family agree to work toward stated goals and plan of care.     Thank you for this referral.  Juan Morrison, PT, DPT   Time Calculation: 34 mins

## 2019-11-26 NOTE — PROGRESS NOTES
Problem: Self Care Deficits Care Plan (Adult)  Goal: *Acute Goals and Plan of Care (Insert Text)  Description  FUNCTIONAL STATUS PRIOR TO ADMISSION: Has home health aides through the [facility].  Lives in a 2nd floor apartment; has an elevator. She uses a rollator and has some assistance with ADL. HOME SUPPORT: Patient lives alone but has caregivers through [employer]. Occupational Therapy Goals  Initiated 11/26/2019  1. Patient will perform grooming standing at the sink with modified independence within 7 day(s). 2.  Patient will perform bathing with minimal assistance/contact guard assist within 7 day(s). 3.  Patient will perform lower body dressing with minimal assistance/contact guard assist within 7 day(s). 4.  Patient will transfer to the [facility] with modified independence within 7 day(s). 5.  Patient will perform all aspects of toileting with modified independence within 7 day(s). Outcome: Progressing Towards Goal    OCCUPATIONAL THERAPY EVALUATION  Patient: Ellen Austin (75 y.o. female)  Date: 11/26/2019  Primary Diagnosis: Hyperkalemia [E87.5]        Precautions:  Fall, DNR    ASSESSMENT  Based on the objective data described below, the patient presents with deficits in self-care, primarily due to general weakness, decreased activity tolerance, and decreased dynamic balance. She is functioning slightly below her functional baseline and will benefit from skilled OT treatment to maximize independence and safety in ADL. Current Level of Function Impacting Discharge (ADLs/self-care): Mod A to S    Functional Outcome Measure: The patient scored 65/100 on the Barthel Index. Other factors to consider for discharge: N/A     Patient will benefit from skilled therapy intervention to address the above noted impairments.        PLAN :  Recommendations and Planned Interventions: self care training, functional mobility training, therapeutic exercise, balance training, therapeutic activities, endurance activities, patient education, home safety training, and family training/education    Frequency/Duration: Patient will be followed by occupational therapy 3 times a week to address goals. Recommendation for discharge: (in order for the patient to meet his/her long term goals)  Occupational therapy at least 2 days/week in the home     This discharge recommendation:  Has been made in collaboration with the attending provider and/or case management    IF patient discharges home will need the following DME: none       SUBJECTIVE:   Patient stated I want to go home.     OBJECTIVE DATA SUMMARY:   HISTORY:   Past Medical History:   Diagnosis Date    Asthma     CAD (coronary artery disease)     Chronic kidney disease     Chronic obstructive pulmonary disease (HCC)     DM (diabetes mellitus), type 2 ([facility]     H/O acute myocardial infarction     Heart failure ([facility]     Hypertension      Past Surgical History:   Procedure Laterality Date    HX CHOLECYSTECTOMY      gall stones removed    HX GYN         Expanded or extensive additional review of patient history:     Home Situation  Home Environment: Independent living  # Steps to Enter: 0  One/Two Story Residence: One story  Living Alone: Yes  Support Systems: (Home health aide M-F)  Patient Expects to be Discharged to[de-identified] Apartment  Current DME Used/Available at Home: Oxygen, portable, Walker, rollator, Cane, straight, Shower chair, Commode, bedside, Grab bars(2L at baseline)  Tub or Shower Type: Shower    Hand dominance: Right    EXAMINATION OF PERFORMANCE DEFICITS:  Cognitive/Behavioral Status:  Neurologic State: Alert  Orientation Level: Oriented X4  Cognition: Follows commands  Perception: Appears intact  Perseveration: No perseveration noted  Safety/Judgement: Awareness of environment; Insight into deficits;Home safety; Fall prevention    Hearing: Auditory  Auditory Impairment: None    Vision/Perceptual:    Not assessed this date.  No acute changes reported/noted. Range of Motion:  AROM: Within functional limits                         Strength:  Strength: Within functional limits                Coordination:     Fine Motor Skills-Upper: Left Intact; Right Intact    Gross Motor Skills-Upper: Left Intact; Right Intact    Balance:  Sitting: Intact  Standing: With support; Impaired  Standing - Static: Good  Standing - Dynamic : Fair    Functional Mobility and Transfers for ADLs:  Bed Mobility:  Supine to Sit: Contact guard assistance; Additional time    Transfers:  Sit to Stand: Stand-by assistance  Stand to Sit: Stand-by assistance  Toilet Transfer : Stand-by assistance(to [facility])  Assistive Device : Walker, rolling    ADL Assessment:  Feeding: Independent    Oral Facial Hygiene/Grooming: Supervision(seated)    Bathing: Moderate assistance    Upper Body Dressing: Supervision    Lower Body Dressing: Moderate assistance    Toileting: Minimum assistance                ADL Intervention and task modifications:  Discussed safety and fall prevention during self-care and functional transfers. Patient uses a rollator at home. Initiated ADL training, including brief discussion of equipment. Cognitive Retraining  Safety/Judgement: Awareness of environment; Insight into deficits;Home safety; Fall prevention    Functional Measure:  Barthel Index:    Bathin  Bladder: 5  Bowels: 10  Groomin  Dressing: 10  Feeding: 10  Mobility: 10  Stairs: 0  Toilet Use: 5  Transfer (Bed to Chair and Back): 10  Total: 65/100        The Barthel ADL Index: Guidelines  1. The index should be used as a record of what a patient does, not as a record of what a patient could do. 2. The main aim is to establish degree of independence from any help, physical or verbal, however minor and for whatever reason. 3. The need for supervision renders the patient not independent.   4. A patient's performance should be established using the best available evidence. Asking the patient, friends/relatives and nurses are the usual sources, but direct observation and common sense are also important. However direct testing is not needed. 5. Usually the patient's performance over the preceding 24-48 hours is important, but occasionally longer periods will be relevant. 6. Middle categories imply that the patient supplies over 50 per cent of the effort. 7. Use of aids to be independent is allowed. Columba Adair., Barthel, DRenettaW. (8893). Functional evaluation: the Barthel Index. [address] (14)2. Nicolasa Sandoval jadiel RAQUEL Batista, Sade Felix., Jerrica Oliva., Rehana, [facility] (1999). Measuring the change indisability after inpatient rehabilitation; comparison of the responsiveness of the Barthel Index and Functional [place] Measure. Journal of Neurology, Neurosurgery, and Psychiatry, 66(4), 530-801. GASPER Amado, YORDAN Morales, & Lashaun Wen MJENNIFER. (2004.) Assessment of post-stroke quality of life in cost-effectiveness studies: The usefulness of the Barthel Index and the EuroQoL-5D. Quality of Life Research, 15, 157-57        Occupational Therapy Evaluation Charge Determination   History Examination Decision-Making   LOW Complexity : Brief history review  MEDIUM Complexity : 3-5 performance deficits relating to physical, cognitive , or psychosocial skils that result in activity limitations and / or participation restrictions MEDIUM Complexity : Patient may present with comorbidities that affect occupational performnce. Miniml to moderate modification of tasks or assistance (eg, physical or verbal ) with assesment(s) is necessary to enable patient to complete evaluation       Based on the above components, the patient evaluation is determined to be of the following complexity level: LOW   Pain Rating:  No complaints    Activity Tolerance:   Fair  Please refer to the flowsheet for vital signs taken during this treatment.     After treatment patient left in no apparent distress:    Sitting in chair and Call bell within reach    COMMUNICATION/EDUCATION:   The patients plan of care was discussed with: Physical Therapist and Registered Nurse. Home safety education was provided and the patient/caregiver indicated understanding. and Patient/family agree to work toward stated goals and plan of care. This patients plan of care is appropriate for delegation to [facility].     Thank you for this referral.  Audrey Combs, OTR/L  Time Calculation: 35 mins

## 2019-11-26 NOTE — PROGRESS NOTES
Transition of Care Plan:                  Pt lives alone in a second floor apt for seniors with an elevator for transport. Pt uses home Oxygen, 2 liters via nasal cannula at home.      Pt is [facility] in the [facility].  Pt needs assist.  Kids are POA, has Medicare//generic insurance.  -need DDNR completed prior to d/c  -goes to PCP with [facility]  -pt declines SNF-wants to go home where she gets [facility]. .  -Aide helps her with ADLS

## 2019-11-26 NOTE — PROGRESS NOTES
Pt been spiking low grade temp over night, administered PO tylenol x2. Changed puriwick, tubing, and canister. Bedside shift change report given to [facility] (oncoming nurse) by Paul Gama (offgoing nurse). Report included the following information SBAR, Kardex, Intake/Output, MAR, Recent Results and Cardiac Rhythm NSR.

## 2019-11-26 NOTE — PROGRESS NOTES
Problem: Falls - Risk of  Goal: *Absence of Falls  Description  Document Pancho Mejia Fall Risk and appropriate interventions in the flowsheet.   Outcome: Progressing Towards Goal  Note: Fall Risk Interventions:  Mobility Interventions: Bed/chair exit alarm         Medication Interventions: Teach patient to arise slowly    Elimination Interventions: Call light in reach    History of Falls Interventions: Bed/chair exit alarm

## 2019-11-26 NOTE — PROGRESS NOTES
Hospitalist Progress Note    NAME: Good Morris   :  1950   MRN:  [id number]     Interim Hospital Summary: 78 y.o. female whom presented on 2019 with      Assessment / Plan:    Hypotension  Volume depletion  Anemia  -BP improved after volume expansion with NS bolus, albumin and blood transfusion (1 unit)  -PT OT eval and treat. DC planning for AM with HH PTOT    LEOLA on CKD 4  -Renal US Right renal cysts. No mass, stone or hydronephrosis. No change  -Cr back to baseline. DC IVFs    DM2  -A1c 7.9  -continue lantus with SSI prn    CAD, s/p MI  Chronic systolic HF (TZ57%)  -continue asa, plavix, imdur   -continue lowered dose of coreg for now  -monitor fluid balance closely    COPD  -not in acute exacerbation. Nebs prn  -singulair      40 or above Morbid obesity / Body mass index is 41.01 kg/m². Code status: DNR  Prophylaxis: Hep SQ  Recommended Disposition:  PT, OT, RN       Subjective:     Chief Complaint / Reason for Physician Visit  Follow up of LEOLA, hypotension, anemia, DM2, CHF  Chart reviewed in detail. Discussed with RN events overnight. Review of Systems:  Symptom Y/N Comments  Symptom Y/N Comments   Fever/Chills    Chest Pain     Poor Appetite    Edema     Cough    Abdominal Pain     Sputum    Joint Pain     SOB/LOVE    Pruritis/Rash     Nausea/vomit    Tolerating PT/OT     Diarrhea    Tolerating Diet     Constipation    Other       Could NOT obtain due to:      PO intake: No data found. Objective:     VITALS:   Last 24hrs VS reviewed since prior progress note.  Most recent are:  Patient Vitals for the past 24 hrs:   Temp Pulse Resp BP SpO2   19 1203 98.2 °F (36.8 °C) 78 17 99/54 97 %   19 0711 99.4 °F (37.4 °C) 77 18 122/60 94 %   19 0500 99.4 °F (37.4 °C) -- -- -- --   19 0330 99.9 °F (37.7 °C) 78 16 114/50 98 %   19 2334 99.7 °F (37.6 °C) 85 18 115/62 98 %   19 2223 99.8 °F (37.7 °C) 82 16 128/67 98 %   11/25/19 2200 99.9 °F (37.7 °C) 83 18 136/55 99 %   11/25/19 2130 99.9 °F (37.7 °C) 88 18 136/62 99 %   11/25/19 2100 100 °F (37.8 °C) 84 16 124/68 97 %   11/25/19 2021 100.2 °F (37.9 °C) 79 18 128/63 99 %   11/25/19 2000 99.8 °F (37.7 °C) 82 16 120/68 99 %   11/25/19 1945 99.3 °F (37.4 °C) 77 18 110/60 100 %   11/25/19 1921 99.3 °F (37.4 °C) 80 18 102/65 100 %   11/25/19 1755 99.8 °F (37.7 °C) 79 18 106/56 98 %   11/25/19 1609 98.8 °F (37.1 °C) 75 20 101/56 96 %   11/25/19 1551 -- 72 20 108/52 --   11/25/19 1530 -- 77 18 101/56 99 %   11/25/19 1452 98.3 °F (36.8 °C) 76 18 103/52 100 %       Intake/Output Summary (Last 24 hours) at 11/26/2019 1309  Last data filed at 11/26/2019 0655  Gross per 24 hour   Intake 221.3 ml   Output 550 ml   Net -328.7 ml        PHYSICAL EXAM:  General: WD, WN. Alert, cooperative, no acute distress    EENT:  EOMI. Anicteric sclerae. MMM  Resp:  CTA bilaterally, no wheezing or rales. No accessory muscle use  CV:  Regular  rhythm,  No edema  GI:  Soft, Non distended, Non tender.  +Bowel sounds  Neurologic:  Alert and oriented X 3, normal speech, weak but non focal  Psych:   Good insight. Not anxious nor agitated  Skin:  No rashes. No jaundice    Reviewed most current lab test results and cultures  YES  Reviewed most current radiology test results   YES  Review and summation of old records today    NO  Reviewed patient's current orders and MAR    YES  PMH/SH reviewed - no change compared to H&P  ________________________________________________________________________  Care Plan discussed with:    Comments   Patient x    Family      RN x    Care Manager     Consultant  x Renal                     Multidiciplinary team rounds were held today with , nursing, pharmacist and clinical coordinator. Patient's plan of care was discussed; medications were reviewed and discharge planning was addressed. ________________________________________________________________________  Total NON critical care TIME:   35  Minutes    Total CRITICAL CARE TIME Spent:   Minutes non procedure based      Comments   >50% of visit spent in counseling and coordination of care x     This includes time during multidisciplinary rounds if indicated above   ________________________________________________________________________  Renay Dolan MD     Procedures: see electronic medical records for all procedures/Xrays and details which were not copied into this note but were reviewed prior to creation of Plan. LABS:  I reviewed today's most current labs and imaging studies.   Pertinent labs include:  Recent Labs     11/26/19  0510 11/25/19  1045 11/25/19  0324 11/24/19 1910   WBC 5.9  --  5.1 6.9   HGB 9.6* 7.5* 6.2* 8.3*   HCT 31.6* 26.1* 21.7* 27.6*     --  149* 190     Recent Labs     11/26/19  0510 11/25/19  0324 11/24/19 1910    146* 137   K 3.7 4.0 5.9*    113* 104   CO2 30 25 26   GLU 96 118* 133*   BUN 39* 46* 61*   CREA 1.73* 1.94* 3.01*   CA 8.6 7.1* 8.6   MG  --  2.1  --    PHOS  --  3.8  --    ALB  --  2.9*  --    TBILI  --  0.9  --    SGOT  --  16  --    ALT  --  19  --

## 2019-11-26 NOTE — CONSULTS
[facility]    Name:  Raina Wang  MR#:  [id number]  :  1950  ACCOUNT #:  [de-identified]  DATE OF SERVICE:  2019    REASON FOR CONSULTATION:  Acute kidney injury, hyperkalemia. HISTORY OF PRESENT ILLNESS:  The patient is a 50-year-old -American female with past medical history significant for CKD, hypertension, and CHF who presented to ER with complaint of multiple falls. The patient is found to have acute renal failure with creatinine of 3.0 and potassium of 5.9 on admission. She was treated with Kayexalate, insulin, and D50. Her repeat lab showed potassium of 4.0 and creatinine has improved to 1.94. The patient denies any vomiting or diarrhea prior to admission. Denies taking any NSAIDs. She said she was treated with antibiotic a month ago. She does not recall the name of antibiotic. On admission, the patient was hypotensive. Her systolic blood pressure was 79 on admission. This morning, she was hypotensive, required fluid bolus. She was also found to be anemic with hemoglobin of 6.2 this morning. She has been taking Bumex and Aldactone at home. She is also on potassium at home. Her labs from 2019, creatinine level was 1.35. In 2018, her creatinine level was 1.7. PAST MEDICAL HISTORY:  1. Hypertension. 2.  Coronary artery disease. 3.  Asthma. 4.  Chronic kidney disease. 5.  COPD. 6.  MI.  7.  CHF. 8.  History of diabetes. 9.  History of DJD. 10.  History of anemia. MEDICATIONS AT HOME:  Bumex, KCl supplement, Coreg, Aldactone, Januvia, Imdur, Lantus, Lipitor, Prilosec, Neurontin, Claritin. PAST SURGICAL HISTORY:  Cholecystectomy. SOCIAL HISTORY:  Ex-smoker. Denies any alcohol abuse or drug abuse. FAMILY HISTORY:  Positive for diabetes to mother. Father had coronary artery disease. REVIEW OF SYSTEMS:  Total 11-system reviewed. They are essentially negative.   The patient is complaining of weakness and dizziness. No complaint of dysuria or hematuria, urgency, or frequency. PHYSICAL EXAMINATION:  GENERAL:  The patient is lying in the bed, comfortable. Not in apparent distress. Alert, awake, oriented x3. VITAL SIGNS:  Temperature 98.2, pulse 72, blood pressure 96/49, respiratory rate 18 per minute, oxygen saturation 97% on room air. HEENT:  Oral mucosa is dry. NECK:  Supple. No palpable neck mass. LUNGS:  Clear to auscultation bilaterally. No wheezing. No crackles. CARDIAC:  Normal S1, S2.  Regular rate and rhythm. ABDOMEN:  Soft, nontender. No palpable mass. EXTREMITIES:  Edema, trace. NEUROLOGIC:  Nonfocal.  Normal speech. SKIN:  No rash. JOINT:  No joint effusion or tenderness. PSYCH:  Normal affect and mood. LABORATORIES AND DIAGNOSTIC DATA:  White cell count 5.1, hemoglobin 6.2, platelet 359. Sodium 146, potassium 4.0, BUN 46, creatinine is 1.94, phosphorus 3.8, calcium 7.1. Hemoglobin A1c 7.9.  UA negative for protein, negative for blood. CT abdomen and pelvis without contrast from July showed no hydronephrosis. Renal ultrasound on admission, no hydronephrosis. Right renal cyst.  No solid mass. Right kidney 12.6 cm, left kidney 10.5 cm. IMPRESSION:  1. Acute kidney injury, likely due to dehydration from Bumex use and renal hypoperfusion from hypotension. 2.  Hyperkalemia, due to acute kidney injury along with KCl supplement and Aldactone use. 3.  Chronic kidney disease, baseline creatinine 1.3 to 1.7, chronic kidney disease likely due to hypertension and diabetes. 4.  Status post fall. 5.  Severe anemia. 6.  Type 2 diabetes. 7.  Hypotension, due to hypovolemia. 8.  Right renal cyst.    PLAN:  1. Continue IV fluid normal saline 50 mL per hour. 2.  Agree with blood transfusion. 3.  Check iron panel. 4.  Hold Bumex. 5.  Hold Aldactone. 6.  No more KCl supplement. 7.  No other Coreg dose. 8.  Check BMP in the morning.   9.  Avoid ACE inhibitors or ARB.  10.  Check urine electrolytes. Thanks for consultation. We will follow up the patient with you.       MD SHAUN Barton/[email or web address]  D:  11/25/2019 14:55  T:  11/25/2019 20:02  JOB #:  [id number]  CC:

## 2019-11-26 NOTE — PROGRESS NOTES
Nephrology Progress Note     Roland Cooley     [facility]. [email or web address]                  Phone - [phone]   Patient: Epi Perea   Date- 11/26/2019        Admit Date: 11/24/2019  YOB: 1950             CC: Follow up for  sunshine          Subjective: Interval History:   -Creatinine improving  Hemoglobin better  Blood pressure improved  No c/o sob,   No c/o chest pain,   No c/o nausea or vomiting  No c/o  fever. ROS:- as above   Assessment & Plan:     1. Acute kidney injury, likely due to dehydration from Bumex use and renal hypoperfusion from hypotension. No IV fluids needed  Check BMP in the morning  2. Hyperkalemia, due to acute kidney injury along with KCl supplement and Aldactone use. Low potassium diet  Hold Aldactone    3. Chronic kidney disease, baseline creatinine 1.3 to 1.7, chronic kidney disease likely due to hypertension and diabetes. 4.  Status post fall. 5.  Severe anemia. Status post PRBC transfusion  Give 1 dose of Epogen  6. Type 2 diabetes. 7.  Hypotension, due to hypovolemia.   8.  Right renal cyst.               Physical exam:   GEN: NAD  NECK- Supple, no mass  RESP: Clear b/l, no wheezing, No accessory muscle use  CVS: RRR,S1,S2    ABDO: soft , non tender, No mass  EXT: trace Edema   NEURO: normal speech, non focal    Care Plan discussed with: nurse and patient  Objective:   Visit Vitals  /59 (BP 1 Location: Left arm, BP Patient Position: Sitting)   Pulse 73   Temp 98.7 °F (37.1 °C)   Resp 18   Ht 5' (1.524 m)   Wt 95.3 kg (210 lb)   SpO2 98%   Breastfeeding No   BMI 41.01 kg/m²     Last 3 Recorded Weights in this Encounter    11/24/19 1820 11/26/19 0509   Weight: 95.7 kg (211 lb) 95.3 kg (210 lb)     11/24 1901 - 11/26 0700  In: 221.3   Out: 551 [Urine:550]    Intake/Output Summary (Last 24 hours) at 11/26/2019 1545  Last data filed at 11/26/2019 1500  Gross per 24 hour   Intake 581.3 ml   Output 550 ml   Net 31.3 ml Chart reviewed. Pertinent Notes reviewed.        Medication list  reviewed   Current Facility-Administered Medications   Medication    heparin (porcine) injection 7,500 Units    carvedilol (COREG) tablet 3.125 mg    0.9% sodium chloride infusion 250 mL    0.9% sodium chloride infusion 250 mL    insulin glargine (LANTUS) injection 20 Units    acetaminophen (TYLENOL) tablet 650 mg    sodium chloride (NS) flush 5-40 mL    sodium chloride (NS) flush 5-40 mL    albuterol-ipratropium (DUO-NEB) 2.5 MG-0.5 MG/3 ML    aspirin delayed-release tablet 81 mg    fluticasone-vilanterol (BREO ELLIPTA) 100mcg-25mcg/puff    clopidogrel (PLAVIX) tablet 75 mg    ferrous sulfate tablet 325 mg    fluticasone propionate (FLONASE) 50 mcg/actuation nasal spray 2 Spray    gabapentin (NEURONTIN) capsule 300 mg    isosorbide mononitrate ER (IMDUR) tablet 30 mg    loratadine (CLARITIN) tablet 10 mg    magnesium oxide (MAG-OX) tablet 800 mg    montelukast (SINGULAIR) tablet 10 mg    nitroglycerin (NITROSTAT) tablet 0.4 mg    pantoprazole (PROTONIX) tablet 40 mg    sertraline (ZOLOFT) tablet 150 mg    umeclidinium (INCRUSE ELLIPTA) 62.5 mcg/actuation    traMADol (ULTRAM) tablet 50 mg    acetaminophen (TYLENOL) tablet 650 mg    ondansetron (ZOFRAN) injection 4 mg    insulin lispro (HUMALOG) injection    glucose chewable tablet 16 g    dextrose (D50) infusion 12.5-25 g    glucagon (GLUCAGEN) injection 1 mg           Data Review :  Recent Labs     11/26/19  0510 11/25/19  0324 11/24/19  1910    146* 137   K 3.7 4.0 5.9*    113* 104   CO2 30 25 26   BUN 39* 46* 61*   CREA 1.73* 1.94* 3.01*   GLU 96 118* 133*   CA 8.6 7.1* 8.6   MG  --  2.1  --    PHOS  --  3.8  --      Recent Labs     11/26/19  0510 11/25/19  1045 11/25/19 0324 11/24/19 1910   WBC 5.9  --  5.1 6.9   HGB 9.6* 7.5* 6.2* 8.3*   HCT 31.6* 26.1* 21.7* 27.6*     --  149* 190     Recent Labs     11/26/19  0510   TIBC 270   PSAT 21 Recent Labs     11/25/19  0324 11/24/19  2130   CPK 78 104     Lab Results   Component Value Date/Time    Color YELLOW/STRAW 11/24/2019 07:19 PM    Appearance CLEAR 11/24/2019 07:19 PM    Specific gravity 1.010 11/24/2019 07:19 PM    pH (UA) 5.0 11/24/2019 07:19 PM    Protein NEGATIVE  11/24/2019 07:19 PM    Glucose NEGATIVE  11/24/2019 07:19 PM    Ketone NEGATIVE  11/24/2019 07:19 PM    Bilirubin NEGATIVE  11/24/2019 07:19 PM    Urobilinogen 0.2 11/24/2019 07:19 PM    Nitrites NEGATIVE  11/24/2019 07:19 PM    Leukocyte Esterase NEGATIVE  11/24/2019 07:19 PM    Epithelial cells FEW 07/29/2019 09:57 AM    Bacteria 1+ (A) 07/29/2019 09:57 AM    WBC 10-20 07/29/2019 09:57 AM    RBC 5-10 07/29/2019 09:57 AM     Lab Results   Component Value Date/Time    Culture result: DIPHTHEROIDS (A) 11/24/2019 07:19 PM    Culture result: CHECKING FOR POSSIBLE  OTHER ORGANISM   11/24/2019 07:19 PM    Culture result: MIXED UROGENITAL LLOYD ISOLATED 07/29/2019 09:57 AM     No results found for: SDES  Lab Results   Component Value Date/Time    Sodium,urine random 37 11/25/2019 08:45 PM    Creatinine, urine 62.60 11/25/2019 08:45 PM       Results from Hospital Encounter encounter on 11/24/19   XR KNEE LT 3 V    Narrative EXAM: XR KNEE LT 3 V    INDICATION: fall, bruising. COMPARISON: None. FINDINGS: Three views of the left knee demonstrate no fracture or other acute  osseous or articular abnormality. There is no effusion. Impression IMPRESSION: No acute abnormality. Mariangel Mai MD  [facility]   [email or web address]  [facility]  [address], [address]  [place], [address]  Phone - [phone]         Fax - [phone]

## 2019-11-27 VITALS
TEMPERATURE: 98.6 F | SYSTOLIC BLOOD PRESSURE: 114 MMHG | OXYGEN SATURATION: 96 % | BODY MASS INDEX: 41.37 KG/M2 | WEIGHT: 210.7 LBS | HEIGHT: 60 IN | HEART RATE: 70 BPM | RESPIRATION RATE: 18 BRPM | DIASTOLIC BLOOD PRESSURE: 47 MMHG

## 2019-11-27 LAB
GLUCOSE BLD STRIP.AUTO-MCNC: 88 MG/DL (ref 65–100)
SERVICE CMNT-IMP: NORMAL

## 2019-11-27 PROCEDURE — 74011250636 HC RX REV CODE- 250/636: Performed by: INTERNAL MEDICINE

## 2019-11-27 PROCEDURE — 74011250637 HC RX REV CODE- 250/637: Performed by: INTERNAL MEDICINE

## 2019-11-27 PROCEDURE — 77010033678 HC OXYGEN DAILY

## 2019-11-27 PROCEDURE — 82962 GLUCOSE BLOOD TEST: CPT

## 2019-11-27 PROCEDURE — 94760 N-INVAS EAR/PLS OXIMETRY 1: CPT

## 2019-11-27 RX ORDER — CARVEDILOL 6.25 MG/1
3.12 TABLET ORAL 2 TIMES DAILY WITH MEALS
Status: SHIPPED | COMMUNITY
Start: 2019-11-27

## 2019-11-27 RX ORDER — POTASSIUM CHLORIDE 1500 MG/1
20 TABLET, FILM COATED, EXTENDED RELEASE ORAL 2 TIMES DAILY
Qty: 180 TAB | Refills: 0 | Status: SHIPPED | OUTPATIENT
Start: 2019-11-27 | End: 2019-12-27

## 2019-11-27 RX ORDER — BUMETANIDE 2 MG/1
2 TABLET ORAL DAILY
Qty: 2 TAB | Refills: 0 | Status: SHIPPED | OUTPATIENT
Start: 2019-11-27

## 2019-11-27 RX ADMIN — ASPIRIN 81 MG: 81 TABLET, COATED ORAL at 08:23

## 2019-11-27 RX ADMIN — SERTRALINE HYDROCHLORIDE 150 MG: 50 TABLET ORAL at 08:23

## 2019-11-27 RX ADMIN — MONTELUKAST 10 MG: 10 TABLET, FILM COATED ORAL at 08:23

## 2019-11-27 RX ADMIN — HEPARIN SODIUM 7500 UNITS: 5000 INJECTION INTRAVENOUS; SUBCUTANEOUS at 00:17

## 2019-11-27 RX ADMIN — FLUTICASONE FUROATE AND VILANTEROL TRIFENATATE 1 PUFF: 100; 25 POWDER RESPIRATORY (INHALATION) at 08:24

## 2019-11-27 RX ADMIN — CARVEDILOL 3.12 MG: 3.12 TABLET, FILM COATED ORAL at 08:23

## 2019-11-27 RX ADMIN — CLOPIDOGREL BISULFATE 75 MG: 75 TABLET ORAL at 08:23

## 2019-11-27 RX ADMIN — ISOSORBIDE MONONITRATE 30 MG: 30 TABLET, EXTENDED RELEASE ORAL at 08:24

## 2019-11-27 RX ADMIN — FERROUS SULFATE TAB 325 MG (65 MG ELEMENTAL FE) 325 MG: 325 (65 FE) TAB at 08:23

## 2019-11-27 RX ADMIN — FLUTICASONE PROPIONATE 2 SPRAY: 50 SPRAY, METERED NASAL at 08:29

## 2019-11-27 RX ADMIN — Medication 800 MG: at 08:23

## 2019-11-27 RX ADMIN — UMECLIDINIUM 1 PUFF: 62.5 AEROSOL, POWDER ORAL at 08:24

## 2019-11-27 RX ADMIN — Medication 10 ML: at 06:30

## 2019-11-27 RX ADMIN — PANTOPRAZOLE SODIUM 40 MG: 40 TABLET, DELAYED RELEASE ORAL at 08:24

## 2019-11-27 RX ADMIN — HEPARIN SODIUM 7500 UNITS: 5000 INJECTION INTRAVENOUS; SUBCUTANEOUS at 08:23

## 2019-11-27 RX ADMIN — GABAPENTIN 300 MG: 300 CAPSULE ORAL at 08:23

## 2019-11-27 NOTE — PROGRESS NOTES
Bedside shift change report given to [facility] (oncoming nurse) by Katharina Shea (offgoing nurse). Report included the following information SBAR, Kardex, Intake/Output, MAR and Cardiac Rhythm NSR. No significant events overnight.

## 2019-11-27 NOTE — DISCHARGE SUMMARY
Hospitalist Discharge Summary     Patient ID:  Lee Barboza  [id number]  1 y.o.  1950    PCP on record: Shannan Avelar MD    Admit date: 11/24/2019  Discharge date and time: 11/27/2019      DISCHARGE DIAGNOSIS:    Hypotension  Volume depletion  Anemia  LEOLA on CKD 4  DM2  CAD, s/p MI  Chronic systolic HF (UA87%)  COPD  40 or above Morbid obesity / Body mass index is 41.01 kg/m². CONSULTATIONS:  IP CONSULT TO NEPHROLOGY    Excerpted HPI from H&P of Karina Sutton MD:  CHIEF COMPLAINT: \"I feel 5 times today\"     HISTORY OF PRESENT ILLNESS:     Lee Barboza is a 78 y.o.  female  with history of CAD, diabetes, CKD, hypertension, complains of frequent falls over the past 48 hours.  States she fell approximately 3 or 4 times yesterday and twice today.  All of been minor falls, she denies significant injuries.  She does have some new pain in her left knee, aching and throbbing, 5 out of 10 severity, worsened by bending or bearing weight.  She denies any hip pain, abdominal pain, chest pain or headache.  She is not anticoagulated.  She lives in a senior assisted living facility. At this time patient is lying in bed had fallen 5 times today, she feels weak and tired, denies chest pain, no SOB, no fever, no N/V no diarrhea, no urinary symptoms, no other associated symptoms. We were asked to admit for work up and evaluation of the above problems. ______________________________________________________________________  DISCHARGE SUMMARY/HOSPITAL COURSE:  for full details see H&P, daily progress notes, labs, consult notes. Hypotension  Volume depletion  Anemia  -BP recovered quickly after volume expansion with NS bolus, albumin and blood transfusion (1 unit)  -PT OT eval and treat. DC planning for AM with HH PTOT     LEOLA on CKD 4  -Renal US Right renal cysts. No mass, stone or hydronephrosis. No change  -Cr back to 1.73, near her baseline.    DM2  -A1c 7.9  -continue lantus with SSI prn     CAD, s/p MI  Chronic systolic HF (AS73%)  -continue asa, plavix, imdur   -continue lowered dose of bumex and coreg for now and follow up with PCP or cardiologist    COPD  -not in acute exacerbation. Nebs prn  -singulair     40 or above Morbid obesity / Body mass index is 41.01 kg/m².    _______________________________________________________________________  Patient seen and examined by me on discharge day. Pertinent Findings:  Gen:    Not in distress  Chest: Clear lungs  CVS:   Regular rhythm. No edema  Abd:  Soft, not distended, not tender  Neuro:  Alert, Oriented x 4, grossly non focal exam  _______________________________________________________________________  DISCHARGE MEDICATIONS:   Current Discharge Medication List      CONTINUE these medications which have CHANGED    Details   carvedilol (COREG) 6.25 mg tablet Take 0.5 Tabs by mouth two (2) times daily (with meals). potassium chloride SR (K-TAB) 20 mEq tablet Take 1 Tab by mouth two (2) times a day for 30 days. Take with bumex  Qty: 180 Tab, Refills: 0      bumetanide (BUMEX) 2 mg tablet Take 1 Tab by mouth daily. Qty: 2 Tab, Refills: 0         CONTINUE these medications which have NOT CHANGED    Details   magnesium oxide (MAG-OX) 400 mg tablet Take 2 Tabs by mouth two (2) times a day. Take with bumex  Qty: 120 Tab, Refills: 0      traMADol (ULTRAM) 50 mg tablet Take 50 mg by mouth three (3) times daily as needed (back pain). Pt taking only once a day. nitroglycerin (NITROSTAT) 0.4 mg SL tablet by SubLINGual route every five (5) minutes as needed for Chest Pain.      tiotropium (SPIRIVA WITH HANDIHALER) 18 mcg inhalation capsule Take 1 Cap by inhalation daily. SITagliptin (JANUVIA) 100 mg tablet Take 100 mg by mouth daily. isosorbide mononitrate ER (IMDUR) 30 mg tablet Take 30 mg by mouth daily.       ferrous sulfate 325 mg (65 mg iron) tablet Take 325 mg by mouth two (2) times daily (with meals). insulin glargine (LANTUS) 100 unit/mL injection 30 Units by SubCUTAneous route nightly. albuterol (PROVENTIL VENTOLIN) 2.5 mg /3 mL (0.083 %) nebulizer solution 3 mL by Nebulization route every four (4) hours as needed for Wheezing or Shortness of Breath. Qty: 24 Each, Refills: 3      ipratropium (ATROVENT) 0.02 % nebulizer solution 2.5 mL by Nebulization route every six (6) hours as needed for Wheezing. Qty: 25 mL, Refills: 3      aspirin delayed-release 81 mg tablet Take 81 mg by mouth daily. atorvastatin (LIPITOR) 80 mg tablet Take 80 mg by mouth daily. budesonide-formoterol (SYMBICORT) 160-4.5 mcg/actuation HFA inhaler Take 2 Puffs by inhalation two (2) times a day. acetaminophen (TYLENOL) 500 mg tablet Take 500 mg by mouth every four (4) hours. Indications: BACK PAIN      omeprazole (PRILOSEC) 20 mg capsule Take 20 mg by mouth Before breakfast and dinner. gabapentin (NEURONTIN) 300 mg capsule Take 300 mg by mouth three (3) times daily. montelukast (SINGULAIR) 10 mg tablet Take 10 mg by mouth daily. cholecalciferol (VITAMIN D3) 1,000 unit tablet Take 1,000 Units by mouth daily. clopidogrel (PLAVIX) 75 mg tablet Take 75 mg by mouth daily. fluticasone (FLONASE) 50 mcg/actuation nasal spray 2 Sprays by Both Nostrils route daily. sertraline (ZOLOFT) 100 mg tablet Take 150 mg by mouth daily. loratadine (CLARITIN) 10 mg tablet Take 10 mg by mouth daily as needed for Allergies or Rhinitis. albuterol (PROVENTIL HFA, VENTOLIN HFA) 90 mcg/actuation inhaler Take 2 Puffs by inhalation every four (4) hours as needed for Wheezing or Shortness of Breath.          STOP taking these medications       spironolactone (ALDACTONE) 25 mg tablet Comments:   Reason for Stopping:         benzonatate (TESSALON) 100 mg capsule Comments:   Reason for Stopping:               My Recommended Diet, Activity, Wound Care, and follow-up labs are listed in the patient's Discharge Insturctions which I have personally completed and reviewed.   Risk of deterioration: Low    Condition at Discharge:  Stable  _____________________________________________________________________    Disposition  Home with family, no needs  ____________________________________________________________________    Care Plan discussed with:   Patient, Family, RN, Care Manager, Consultant    ____________________________________________________________________    Code Status: Full Code  ____________________________________________________________________      Condition at Discharge:  Stable  _____________________________________________________________________  Follow up with:   PCP : Shannan Avelar MD  Follow-up Information     Follow up With Specialties Details Why Contact Joy Van MD Nephrology In 2 weeks  [address] [facility]  [phone]      Shannan Avelar MD    Patient can only remember the practice name and not the physician                Total time in minutes spent coordinating this discharge (includes going over instructions, follow-up, prescriptions, and preparing report for sign off to her PCP) :  35 minutes    Signed:  Jose Shen MD

## 2019-11-27 NOTE — PROGRESS NOTES
Bedside shift change report given to Brian Cruz (oncoming nurse) by Fabiola Dietrich RN   (offgoing nurse). Report included the following information SBAR, Kardex, ED Summary, [facility] and Recent Results.

## 2019-11-27 NOTE — PROGRESS NOTES
Discussed discharge instructions with pt. Opportunity to ask questions given. Pt given copy of instructions and advised to obtain all belongings prior to leaving.

## 2019-11-27 NOTE — PROGRESS NOTES
Problem: Falls - Risk of  Goal: *Absence of Falls  Description  Document Halley Blue Fall Risk and appropriate interventions in the flowsheet. Outcome: Progressing Towards Goal  Note: Fall Risk Interventions:  Mobility Interventions: Communicate number of staff needed for ambulation/transfer, Patient to call before getting OOB, Utilize walker, cane, or other assistive device         Medication Interventions: Teach patient to arise slowly, Evaluate medications/consider consulting pharmacy, Patient to call before getting OOB    Elimination Interventions: Call light in reach, Toileting schedule/hourly rounds, Patient to call for help with toileting needs    History of Falls Interventions:  Investigate reason for fall, Evaluate medications/consider consulting pharmacy

## 2019-11-27 NOTE — DISCHARGE INSTRUCTIONS
HOSPITALIST DISCHARGE INSTRUCTIONS    NAME: Raghav Valencia   :  1950   MRN:  [id number]     Date/Time:  2019 8:07 AM    ADMIT DATE: 2019   DISCHARGE DATE: 2019         · It is important that you take the medication exactly as they are prescribed. · Keep your medication in the bottles provided by the pharmacist and keep a list of the medication names, dosages, and times to be taken in your wallet. · Do not take other medications without consulting your doctor. What to do at [address]:  Cardiac Diet    Recommended activity: Activity as tolerated      If you have questions regarding the hospital related prescriptions or hospital related issues please call [facility] at [phone]. You can always direct your questions to your primary care doctor if you are unable to reach your hospital physician; your PCP works as an extension of your hospital doctor just like your hospital doctor is an extension of your PCP for your time at the hospital [facility])    If you experience any of the following symptoms then please call your primary care physician or return to the emergency room if you cannot get hold of your doctor:    Fever, chills, nausea, vomiting, or persistent diarrhea  Worsening weakness or new problems with your speech or balance  Dark stools or visible blood in your stools  New Leg swelling or shortness of breath as these could be signs of a clot    Additional Instructions: Follow up with your doctors at [facility] for a repeat blood test (BMP) next week. Information obtained by :  I understand that if any problems occur once I am at home I am to contact my physician. I understand and acknowledge receipt of the instructions indicated above.                                                                                                                                            Physician's or R.N.'s Signature Date/Time                                                                                                                                              Patient or Representative Signature

## 2019-11-29 LAB
BACTERIA SPEC CULT: ABNORMAL
BACTERIA SPEC CULT: ABNORMAL
CC UR VC: ABNORMAL
SERVICE CMNT-IMP: ABNORMAL

## 2020-08-12 ENCOUNTER — HOSPITAL ENCOUNTER (OUTPATIENT)
Dept: CT IMAGING | Age: 70
Discharge: HOME | End: 2020-08-12
Attending: INTERNAL MEDICINE
Payer: COMMERCIAL

## 2020-08-12 DIAGNOSIS — Z87.891 PERSONAL HISTORY OF NICOTINE DEPENDENCE: ICD-10-CM

## 2020-08-12 PROCEDURE — G0297 LDCT FOR LUNG CA SCREEN: HCPCS

## 2021-03-30 ENCOUNTER — TRANSCRIBE ORDER (OUTPATIENT)
Dept: SCHEDULING | Age: 71
End: 2021-03-30

## 2021-03-30 DIAGNOSIS — Z87.891 PERSONAL HISTORY OF NICOTINE DEPENDENCE: Primary | ICD-10-CM

## 2021-08-11 ENCOUNTER — HOSPITAL ENCOUNTER (OUTPATIENT)
Dept: CT IMAGING | Age: 71
Discharge: HOME | End: 2021-08-11
Attending: INTERNAL MEDICINE
Payer: MEDICARE

## 2021-08-11 DIAGNOSIS — Z87.891 PERSONAL HISTORY OF NICOTINE DEPENDENCE: ICD-10-CM

## 2021-08-11 PROCEDURE — 71271 CT THORAX LUNG CANCER SCR C-: CPT

## 2022-02-23 ENCOUNTER — TRANSCRIBE ORDER (OUTPATIENT)
Dept: SCHEDULING | Age: 72
End: 2022-02-23

## 2022-02-23 DIAGNOSIS — Z87.891 FORMER SMOKER: Primary | ICD-10-CM

## 2022-03-18 PROBLEM — E87.5 HYPERKALEMIA: Status: ACTIVE | Noted: 2019-11-24

## 2022-03-19 PROBLEM — E87.6 HYPOKALEMIA: Status: ACTIVE | Noted: 2018-01-04

## 2022-03-19 PROBLEM — R29.6 RECURRENT FALLS: Status: ACTIVE | Noted: 2019-06-15

## 2022-03-19 PROBLEM — Z72.0 TOBACCO ABUSE: Status: ACTIVE | Noted: 2019-06-17

## 2022-03-19 PROBLEM — N17.9 AKI (ACUTE KIDNEY INJURY) (HCC): Status: ACTIVE | Noted: 2019-06-15

## 2022-04-05 ENCOUNTER — TRANSCRIBE ORDER (OUTPATIENT)
Dept: SCHEDULING | Age: 72
End: 2022-04-05

## 2022-04-05 DIAGNOSIS — Z87.891 PERSONAL HISTORY OF TOBACCO USE, PRESENTING HAZARDS TO HEALTH: Primary | ICD-10-CM

## 2022-04-05 DIAGNOSIS — Z87.891 FORMER SMOKER: Primary | ICD-10-CM

## 2022-08-01 ENCOUNTER — HOSPITAL ENCOUNTER (OUTPATIENT)
Dept: CT IMAGING | Age: 72
Discharge: HOME | End: 2022-08-01
Attending: INTERNAL MEDICINE
Payer: MEDICARE

## 2022-08-01 DIAGNOSIS — Z87.891 PERSONAL HISTORY OF TOBACCO USE, PRESENTING HAZARDS TO HEALTH: ICD-10-CM

## 2022-08-01 PROCEDURE — 71271 CT THORAX LUNG CANCER SCR C-: CPT

## 2022-08-09 ENCOUNTER — TRANSCRIBE ORDER (OUTPATIENT)
Dept: SCHEDULING | Age: 72
End: 2022-08-09

## 2022-08-09 DIAGNOSIS — J18.9 PNEUMONIA OF RIGHT UPPER LOBE DUE TO INFECTIOUS ORGANISM: Primary | ICD-10-CM

## 2022-09-09 ENCOUNTER — HOSPITAL ENCOUNTER (OUTPATIENT)
Dept: CT IMAGING | Age: 72
Discharge: HOME | End: 2022-09-09
Attending: INTERNAL MEDICINE
Payer: MEDICARE

## 2022-09-09 DIAGNOSIS — J18.9 PNEUMONIA OF RIGHT UPPER LOBE DUE TO INFECTIOUS ORGANISM: ICD-10-CM

## 2022-09-09 PROCEDURE — 71250 CT THORAX DX C-: CPT

## 2022-12-07 ENCOUNTER — TRANSCRIBE ORDER (OUTPATIENT)
Dept: SCHEDULING | Age: 72
End: 2022-12-07

## 2022-12-07 DIAGNOSIS — J32.9 CHRONIC SINUSITIS, UNSPECIFIED LOCATION: Primary | ICD-10-CM

## 2023-04-22 DIAGNOSIS — J32.9 CHRONIC SINUSITIS, UNSPECIFIED LOCATION: Primary | ICD-10-CM

## 2023-05-08 ENCOUNTER — CLINICAL DOCUMENTATION (OUTPATIENT)
Facility: HOSPITAL | Age: 73
End: 2023-05-08

## 2023-05-08 NOTE — PROGRESS NOTES
Received returned LDCT reminder letter, marked as \"\". Unknown as to date of death as this is not noted in [address] [place] [address] Rd.

## 2023-05-20 RX ORDER — NITROGLYCERIN 0.4 MG/1
TABLET SUBLINGUAL
COMMUNITY

## 2023-05-20 RX ORDER — ASPIRIN 81 MG/1
81 TABLET ORAL DAILY
COMMUNITY

## 2023-05-20 RX ORDER — CLOPIDOGREL BISULFATE 75 MG/1
75 TABLET ORAL DAILY
COMMUNITY

## 2023-05-20 RX ORDER — ACETAMINOPHEN 500 MG
500 TABLET ORAL EVERY 4 HOURS
COMMUNITY

## 2023-05-20 RX ORDER — LORATADINE 10 MG/1
10 TABLET ORAL DAILY PRN
COMMUNITY

## 2023-05-20 RX ORDER — ATORVASTATIN CALCIUM 80 MG/1
80 TABLET, FILM COATED ORAL DAILY
COMMUNITY

## 2023-05-20 RX ORDER — SERTRALINE HYDROCHLORIDE 100 MG/1
150 TABLET, FILM COATED ORAL DAILY
COMMUNITY

## 2023-05-20 RX ORDER — TRAMADOL HYDROCHLORIDE 50 MG/1
50 TABLET ORAL 3 TIMES DAILY PRN
COMMUNITY

## 2023-05-20 RX ORDER — ISOSORBIDE MONONITRATE 30 MG/1
30 TABLET, EXTENDED RELEASE ORAL DAILY
COMMUNITY

## 2023-05-20 RX ORDER — MAGNESIUM OXIDE 400 MG/1
800 TABLET ORAL 2 TIMES DAILY
COMMUNITY
Start: 2018-01-06

## 2023-05-20 RX ORDER — ALBUTEROL SULFATE 90 UG/1
2 AEROSOL, METERED RESPIRATORY (INHALATION) EVERY 4 HOURS PRN
COMMUNITY

## 2023-05-20 RX ORDER — GABAPENTIN 300 MG/1
300 CAPSULE ORAL 3 TIMES DAILY
COMMUNITY

## 2023-05-20 RX ORDER — BUMETANIDE 2 MG/1
2 TABLET ORAL DAILY
COMMUNITY
Start: 2019-11-27

## 2023-05-20 RX ORDER — ALBUTEROL SULFATE 2.5 MG/3ML
2.5 SOLUTION RESPIRATORY (INHALATION) EVERY 4 HOURS PRN
COMMUNITY
Start: 2016-03-15

## 2023-05-20 RX ORDER — BUDESONIDE AND FORMOTEROL FUMARATE DIHYDRATE 160; 4.5 UG/1; UG/1
2 AEROSOL RESPIRATORY (INHALATION) 2 TIMES DAILY
COMMUNITY

## 2023-05-20 RX ORDER — MONTELUKAST SODIUM 10 MG/1
10 TABLET ORAL DAILY
COMMUNITY

## 2023-05-20 RX ORDER — INSULIN GLARGINE 100 [IU]/ML
30 INJECTION, SOLUTION SUBCUTANEOUS
COMMUNITY

## 2023-05-20 RX ORDER — FERROUS SULFATE 325(65) MG
325 TABLET ORAL 2 TIMES DAILY WITH MEALS
COMMUNITY

## 2023-05-20 RX ORDER — OMEPRAZOLE 20 MG/1
20 CAPSULE, DELAYED RELEASE ORAL
COMMUNITY

## 2023-05-20 RX ORDER — FLUTICASONE PROPIONATE 50 MCG
2 SPRAY, SUSPENSION (ML) NASAL DAILY
COMMUNITY

## 2023-05-20 RX ORDER — CARVEDILOL 6.25 MG/1
3.12 TABLET ORAL 2 TIMES DAILY WITH MEALS
COMMUNITY
Start: 2019-11-27